# Patient Record
Sex: MALE | Race: BLACK OR AFRICAN AMERICAN | NOT HISPANIC OR LATINO | Employment: OTHER | URBAN - METROPOLITAN AREA
[De-identification: names, ages, dates, MRNs, and addresses within clinical notes are randomized per-mention and may not be internally consistent; named-entity substitution may affect disease eponyms.]

---

## 2018-07-10 ENCOUNTER — HOSPITAL ENCOUNTER (OUTPATIENT)
Dept: MEDSURG UNIT | Facility: HOSPITAL | Age: 58
End: 2018-07-11
Attending: INTERNAL MEDICINE | Admitting: INTERNAL MEDICINE

## 2018-07-10 LAB
ABS NEUT (OLG): 7.24 X10(3)/MCL (ref 2.1–9.2)
APTT PPP: 31.9 SECOND(S) (ref 24.8–36.9)
BASOPHILS # BLD AUTO: 0 X10(3)/MCL (ref 0–0.2)
BASOPHILS NFR BLD AUTO: 0 %
BUN SERPL-MCNC: 14 MG/DL (ref 7–18)
CALCIUM SERPL-MCNC: 9.3 MG/DL (ref 8.5–10.1)
CHLORIDE SERPL-SCNC: 108 MMOL/L (ref 98–107)
CO2 SERPL-SCNC: 29 MMOL/L (ref 21–32)
CREAT SERPL-MCNC: 1.14 MG/DL (ref 0.7–1.3)
CREAT/UREA NIT SERPL: 12.3
EOSINOPHIL # BLD AUTO: 0.1 X10(3)/MCL (ref 0–0.9)
EOSINOPHIL NFR BLD AUTO: 1 %
ERYTHROCYTE [DISTWIDTH] IN BLOOD BY AUTOMATED COUNT: 16.4 % (ref 11.5–17)
GLUCOSE SERPL-MCNC: 131 MG/DL (ref 74–106)
HCT VFR BLD AUTO: 39.7 % (ref 42–52)
HGB BLD-MCNC: 12.5 GM/DL (ref 14–18)
INR PPP: 0.98 (ref 0–1.27)
LYMPHOCYTES # BLD AUTO: 1.5 X10(3)/MCL (ref 0.6–4.6)
LYMPHOCYTES NFR BLD AUTO: 15 %
MAGNESIUM SERPL-MCNC: 2.2 MG/DL (ref 1.8–2.4)
MCH RBC QN AUTO: 30.2 PG (ref 27–31)
MCHC RBC AUTO-ENTMCNC: 31.5 GM/DL (ref 33–36)
MCV RBC AUTO: 95.9 FL (ref 80–94)
MONOCYTES # BLD AUTO: 0.7 X10(3)/MCL (ref 0.1–1.3)
MONOCYTES NFR BLD AUTO: 7 %
NEUTROPHILS # BLD AUTO: 7.24 X10(3)/MCL (ref 1.4–7.9)
NEUTROPHILS NFR BLD AUTO: 75 %
PLATELET # BLD AUTO: 185 X10(3)/MCL (ref 130–400)
PMV BLD AUTO: 12.1 FL (ref 9.4–12.4)
POTASSIUM SERPL-SCNC: 5.1 MMOL/L (ref 3.5–5.1)
PROTHROMBIN TIME: 13.3 SECOND(S) (ref 12.2–14.7)
RBC # BLD AUTO: 4.14 X10(6)/MCL (ref 4.7–6.1)
SODIUM SERPL-SCNC: 143 MMOL/L (ref 136–145)
WBC # SPEC AUTO: 9.6 X10(3)/MCL (ref 4.5–11.5)

## 2018-07-11 LAB
ABS NEUT (OLG): 10.34 X10(3)/MCL (ref 2.1–9.2)
ALBUMIN SERPL-MCNC: 3.3 GM/DL (ref 3.4–5)
ALBUMIN/GLOB SERPL: 0.9 RATIO (ref 1.1–2)
ALP SERPL-CCNC: 116 UNIT/L (ref 50–136)
ALT SERPL-CCNC: 26 UNIT/L (ref 12–78)
AST SERPL-CCNC: 17 UNIT/L (ref 15–37)
BASOPHILS # BLD AUTO: 0 X10(3)/MCL (ref 0–0.2)
BASOPHILS NFR BLD AUTO: 0 %
BILIRUB SERPL-MCNC: 0.6 MG/DL (ref 0.2–1)
BILIRUBIN DIRECT+TOT PNL SERPL-MCNC: 0.2 MG/DL (ref 0–0.5)
BILIRUBIN DIRECT+TOT PNL SERPL-MCNC: 0.4 MG/DL (ref 0–0.8)
BUN SERPL-MCNC: 24 MG/DL (ref 7–18)
CALCIUM SERPL-MCNC: 8.8 MG/DL (ref 8.5–10.1)
CHLORIDE SERPL-SCNC: 110 MMOL/L (ref 98–107)
CO2 SERPL-SCNC: 27 MMOL/L (ref 21–32)
CREAT SERPL-MCNC: 1.46 MG/DL (ref 0.7–1.3)
ERYTHROCYTE [DISTWIDTH] IN BLOOD BY AUTOMATED COUNT: 16.5 % (ref 11.5–17)
GLOBULIN SER-MCNC: 3.6 GM/DL (ref 2.4–3.5)
GLUCOSE SERPL-MCNC: 205 MG/DL (ref 74–106)
HCT VFR BLD AUTO: 34.9 % (ref 42–52)
HGB BLD-MCNC: 10.7 GM/DL (ref 14–18)
LYMPHOCYTES # BLD AUTO: 0.9 X10(3)/MCL (ref 0.6–4.6)
LYMPHOCYTES NFR BLD AUTO: 8 %
MCH RBC QN AUTO: 30.1 PG (ref 27–31)
MCHC RBC AUTO-ENTMCNC: 30.7 GM/DL (ref 33–36)
MCV RBC AUTO: 98.3 FL (ref 80–94)
MONOCYTES # BLD AUTO: 0.8 X10(3)/MCL (ref 0.1–1.3)
MONOCYTES NFR BLD AUTO: 7 %
NEUTROPHILS # BLD AUTO: 10.34 X10(3)/MCL (ref 1.4–7.9)
NEUTROPHILS NFR BLD AUTO: 85 %
PLATELET # BLD AUTO: 172 X10(3)/MCL (ref 130–400)
PMV BLD AUTO: 12.5 FL (ref 9.4–12.4)
POTASSIUM SERPL-SCNC: 5 MMOL/L (ref 3.5–5.1)
PROT SERPL-MCNC: 6.9 GM/DL (ref 6.4–8.2)
RBC # BLD AUTO: 3.55 X10(6)/MCL (ref 4.7–6.1)
SODIUM SERPL-SCNC: 143 MMOL/L (ref 136–145)
WBC # SPEC AUTO: 12.2 X10(3)/MCL (ref 4.5–11.5)

## 2018-09-26 ENCOUNTER — HISTORICAL (OUTPATIENT)
Dept: RESPIRATORY THERAPY | Facility: HOSPITAL | Age: 58
End: 2018-09-26

## 2021-01-18 ENCOUNTER — HISTORICAL (OUTPATIENT)
Dept: ADMINISTRATIVE | Facility: HOSPITAL | Age: 61
End: 2021-01-18

## 2021-01-18 LAB
ALBUMIN SERPL-MCNC: 3.8 GM/DL (ref 3.4–4.8)
ALBUMIN/GLOB SERPL: 1 RATIO (ref 1.1–2)
ALP SERPL-CCNC: 115 UNIT/L (ref 40–150)
ALT SERPL-CCNC: 16 UNIT/L (ref 0–55)
AST SERPL-CCNC: 14 UNIT/L (ref 5–34)
BILIRUB SERPL-MCNC: 0.3 MG/DL
BILIRUBIN DIRECT+TOT PNL SERPL-MCNC: 0.1 MG/DL (ref 0–0.5)
BILIRUBIN DIRECT+TOT PNL SERPL-MCNC: 0.2 MG/DL (ref 0–0.8)
BUN SERPL-MCNC: 19 MG/DL (ref 8.4–25.7)
CALCIUM SERPL-MCNC: 9.2 MG/DL (ref 8.8–10)
CHLORIDE SERPL-SCNC: 104 MMOL/L (ref 98–107)
CHOLEST SERPL-MCNC: 208 MG/DL
CHOLEST/HDLC SERPL: 5 {RATIO} (ref 0–5)
CO2 SERPL-SCNC: 27 MMOL/L (ref 23–31)
CREAT SERPL-MCNC: 1.34 MG/DL (ref 0.73–1.18)
GLOBULIN SER-MCNC: 3.8 GM/DL (ref 2.4–3.5)
GLUCOSE SERPL-MCNC: 182 MG/DL (ref 82–115)
HDLC SERPL-MCNC: 41 MG/DL (ref 35–60)
LDLC SERPL CALC-MCNC: 139 MG/DL (ref 50–140)
POTASSIUM SERPL-SCNC: 4 MMOL/L (ref 3.5–5.1)
PROT SERPL-MCNC: 7.6 GM/DL (ref 5.8–7.6)
SODIUM SERPL-SCNC: 139 MMOL/L (ref 136–145)
TRIGL SERPL-MCNC: 139 MG/DL (ref 34–140)
TSH SERPL-ACNC: 1.65 UIU/ML (ref 0.35–4.94)
VLDLC SERPL CALC-MCNC: 28 MG/DL

## 2022-01-12 ENCOUNTER — HISTORICAL (OUTPATIENT)
Dept: ADMINISTRATIVE | Facility: HOSPITAL | Age: 62
End: 2022-01-12

## 2022-01-12 LAB
ABS NEUT (OLG): 4.34 X10(3)/MCL (ref 2.1–9.2)
ALBUMIN SERPL-MCNC: 3.8 GM/DL (ref 3.4–4.8)
ALBUMIN/GLOB SERPL: 0.8 RATIO (ref 1.1–2)
ALP SERPL-CCNC: 119 UNIT/L (ref 40–150)
ALT SERPL-CCNC: 16 UNIT/L (ref 0–55)
APPEARANCE, UA: CLEAR
AST SERPL-CCNC: 18 UNIT/L (ref 5–34)
BACTERIA SPEC CULT: ABNORMAL
BASOPHILS # BLD AUTO: 0 X10(3)/MCL (ref 0–0.2)
BASOPHILS NFR BLD AUTO: 1 %
BILIRUB SERPL-MCNC: 0.3 MG/DL
BILIRUB UR QL STRIP: NEGATIVE
BILIRUBIN DIRECT+TOT PNL SERPL-MCNC: 0.1 MG/DL (ref 0–0.5)
BILIRUBIN DIRECT+TOT PNL SERPL-MCNC: 0.2 MG/DL (ref 0–0.8)
BUN SERPL-MCNC: 13.7 MG/DL (ref 8.4–25.7)
CALCIUM SERPL-MCNC: 9.8 MG/DL (ref 8.7–10.5)
CHLORIDE SERPL-SCNC: 106 MMOL/L (ref 98–107)
CHOLEST SERPL-MCNC: 160 MG/DL
CHOLEST/HDLC SERPL: 4 {RATIO} (ref 0–5)
CO2 SERPL-SCNC: 27 MMOL/L (ref 23–31)
COLOR UR: ABNORMAL
CREAT SERPL-MCNC: 1.64 MG/DL (ref 0.73–1.18)
EOSINOPHIL # BLD AUTO: 0.1 X10(3)/MCL (ref 0–0.9)
EOSINOPHIL NFR BLD AUTO: 1 %
ERYTHROCYTE [DISTWIDTH] IN BLOOD BY AUTOMATED COUNT: 15 % (ref 11.5–14.5)
EST CREAT CLEARANCE SER (OHS): 47.6 ML/MIN
EST. AVERAGE GLUCOSE BLD GHB EST-MCNC: 171.4 MG/DL
GLOBULIN SER-MCNC: 4.6 GM/DL (ref 2.4–3.5)
GLUCOSE (UA): NORMAL /UL
GLUCOSE SERPL-MCNC: 147 MG/DL (ref 82–115)
HBA1C MFR BLD: 7.6 %
HCT VFR BLD AUTO: 44.9 % (ref 40–51)
HDLC SERPL-MCNC: 40 MG/DL (ref 35–60)
HGB BLD-MCNC: 14.3 GM/DL (ref 13.5–17.5)
HGB UR QL STRIP: ABNORMAL /HPF
HYALINE CASTS #/AREA URNS LPF: ABNORMAL /LPF
IMM GRANULOCYTES # BLD AUTO: 0.03 10*3/UL
IMM GRANULOCYTES NFR BLD AUTO: 0 %
KETONES UR QL STRIP: NEGATIVE /UL
LDLC SERPL CALC-MCNC: 103 MG/DL (ref 50–140)
LEUKOCYTE ESTERASE UR QL STRIP: NEGATIVE
LYMPHOCYTES # BLD AUTO: 1 X10(3)/MCL (ref 0.6–4.6)
LYMPHOCYTES NFR BLD AUTO: 16 %
MCH RBC QN AUTO: 28.5 PG (ref 26–34)
MCHC RBC AUTO-ENTMCNC: 31.8 GM/DL (ref 31–37)
MCV RBC AUTO: 89.4 FL (ref 80–100)
MONOCYTES # BLD AUTO: 1.1 X10(3)/MCL (ref 0.1–1.3)
MONOCYTES NFR BLD AUTO: 17 %
MUCOUS THREADS URNS QL MICRO: ABNORMAL /LPF
NEUTROPHILS # BLD AUTO: 4.34 X10(3)/MCL (ref 2.1–9.2)
NEUTROPHILS NFR BLD AUTO: 65 %
NITRITE UR QL STRIP: NEGATIVE
NRBC BLD AUTO-RTO: 0 % (ref 0–0.2)
PH UR STRIP: 5.5 /UL (ref 4.5–8)
PLATELET # BLD AUTO: 222 X10(3)/MCL (ref 130–400)
PMV BLD AUTO: 12.4 FL (ref 7.4–10.4)
POTASSIUM SERPL-SCNC: 4 MMOL/L (ref 3.5–5.1)
PROT SERPL-MCNC: 8.4 GM/DL (ref 5.8–7.6)
PROT UR QL STRIP: ABNORMAL /UL
PSA SERPL-MCNC: 0.29 NG/ML
RBC # BLD AUTO: 5.02 X10(6)/MCL (ref 4.5–5.9)
RBC #/AREA URNS HPF: ABNORMAL /HPF
SODIUM SERPL-SCNC: 140 MMOL/L (ref 136–145)
SP GR UR STRIP: 1.02 (ref 1–1.03)
SQUAMOUS EPITHELIAL, UA: ABNORMAL /LPF
TRIGL SERPL-MCNC: 86 MG/DL (ref 34–140)
TSH SERPL-ACNC: 0.21 UIU/ML (ref 0.35–4.94)
UROBILINOGEN UR STRIP-ACNC: NORMAL /HPF
VLDLC SERPL CALC-MCNC: 17 MG/DL
WBC # SPEC AUTO: 6.6 X10(3)/MCL (ref 4.5–11)
WBC #/AREA URNS HPF: ABNORMAL /HPF

## 2022-01-19 ENCOUNTER — HISTORICAL (OUTPATIENT)
Dept: INTERNAL MEDICINE | Facility: CLINIC | Age: 62
End: 2022-01-19

## 2022-04-09 ENCOUNTER — HISTORICAL (OUTPATIENT)
Dept: ADMINISTRATIVE | Facility: HOSPITAL | Age: 62
End: 2022-04-09

## 2022-04-27 VITALS
BODY MASS INDEX: 45.1 KG/M2 | WEIGHT: 315 LBS | OXYGEN SATURATION: 100 % | SYSTOLIC BLOOD PRESSURE: 130 MMHG | HEIGHT: 70 IN | DIASTOLIC BLOOD PRESSURE: 80 MMHG

## 2022-04-29 NOTE — OP NOTE
Patient:   Jamey Duke             MRN: [id number]            FIN: [id number]               Age:   57 years     Sex:  Male     :  1960   Associated Diagnoses:   None   Author:   Chapin Syed MD      Operative Note   Operative Information   Date/ Time:  7/10/2018 08:53:00.     Procedures Performed:   1.  Comprehensive EP study without induction of arrhythmia  2.  Left atrial pacing and recording from coronary sinus catheter  3.  Program assimilation pacing after IV drug infusion  4.  Ablation of the caval tricuspid isthmus for typical atrial flutter  5.  3D mapping  .     Indications:   57-year-old white male with typical atrial flutter who is undergoing EP study for definitive diagnosis as well as ablation for definitive therapy.  .     Preoperative Diagnosis:   1.  Typical atrial flutter  2.  History of persistent atrial fibrillation  .     Postoperative Diagnosis:   1.  Typical atrial flutter  2.  History of persistent atrial fibrillation  .     Surgeon: Chapin Syed MD.     Esimated blood loss: loss less than  10  cc.     Description of Procedure/Findings/    Complications:   Summary of procedure:  After risks, benefits, and alternatives were Splane the patient, informed consent was obtained.  The patient brought to the EP lab in a fasting nonsedated state.  Sedation for the procedure was accompanied by the anesthesia team.  The bilateral groins were prepped and draped in usual fashion.  Using 1% lidocaine the right groin was anesthetized.  Using a modified Seldinger technique access was obtained to right femoral vein on 3 occasions were 2 7 [place] citizen sheaths and a 6 [place] citizen sheath were placed.  I then advanced a [employer] decapolar CS catheter through first 7 [place] citizen sheath to the heart and into the coronary sinus for left atrial pacing and recording.  I then advanced a duo decapolar Halo catheter and a CRD 2 catheter.  The halo catheters placed in right atrium along the vitaliy terminalis  for right atrial pacing and recording.  The CRD 2 catheter was placed in the His bundle region for His bundle recording.  Based on measures then obtained.    The patient did present in a typical appearing atrial flutter with counterclockwise activation of the right atrium and concentric activation left atrium.  We performed a treatment from both the proximal coronary sinus and distal halo.  The post pacing intervals from both locations were consistent with being in the tachycardia circuit.  With these findings I concluded the patient had typical isthmus dependent atrial flutter.    The CRD 2 catheter was removed from the body and a 6 [place] sheath was exchanged for a SRO sheath.  I then advanced a [employer] 4 mm tip F-J curve irrigated ablation catheter through the SRO sheath and placed at the distal aspect of the caval tricuspid isthmus in the 6 o'clock position.  I then performed drag line ablation lesions through the caval tricuspid isthmus.  When ablating the proximal aspect of the isthmus there was termination of atrial flutter and then with pacing from the proximal coronary sinus we confirmed bidirectional block.  Some consolidating lesions were then performed in this area.  I then repositioned the ablation catheter at the distal aspect of this previous line and traced this line performing ablation in areas where there are adequate atrial signals.  The ablation catheter was then placed back in the His bundle region and post ablation measurements were obtained.    I then performed post ablation atrial study with atrial burst pacing, decremental pacing, and extra stimuli.  After 10 minutes the patient was then given 12 mg of IV adenosine to hyper-do polarize atrial tissue and evaluate for reconnection of conduction through the caval tricuspid isthmus with pacing from both the proximal coronary sinus and distal halo catheters.  There is no evidence of reconnection.  After 15 minutes again performed  pacing from the proximal coronary sinus distal halo and confirmed bidirectional block.    At this point I decided to terminate the procedure.  The catheters removed from the body the sheaths were flushed.  The SRO sheath was exchanged for a short 9 Kinyarwanda sheath.  The patient be transferred to the postanesthesia care unit where the sheath will be removed and hemostasis will be obtained with manual compression.    3D mapping was used throughout the procedure to cynthia location the catheters as well as ablation lesions.    Baseline measurements:  The patient presented in a counterclockwise activating or atrial flutter with concentric activation left atrium.  The atrial flutter cycle length was 270 ms.  AV node: The HV interval was 47 ms  Ventricle: The QRS duration was 97 ms and the QT interval was 380 ms    Tachycardia characterization: The post pacing interval after entrainment from the distal halo catheter was 287 ms.  The post pacing interval after an treatment from the proximal coronary sinus was 280 ms.    Ablation: I performed a total number of 4 ablation lesions for total time of 12 minutes and 40 seconds.  Average temperature is 27°C and average power was 28 W.    Post ablation study:  Sinus node: The sinus cycle length was 819 ms  AV node: The CO interval was 170 ms, the AH interval was 111 ms, HV was 47 ms  Ventricle: QRS duration was 90 ms and the QT was 475 ms    Atrial study: The AV block cycle length was found at 510 ms.  AV node ERP was found at 440 ms after drive train of 600 ms.  Atrial ERP was found at 260 ms of a drive train of 600 ms.    Trans-isthmus conduction: The post ablation trans-isthmus conduction time with pacing from the proximal point sinus the distal halo catheter was 140 ms.  The post ablation trans-isthmus conduction time with pacing from the distal halo catheter the proximal coronary sinus was 150 ms.  There was positive differential pacing as when we paced from halo electrodes 5, 6  the trans-isthmus conduction time to the proximal coronary sinus was 138 ms.    Impression:  1.  Typical isthmus dependent atrial flutter  2.  Successful ablation of the caval tricuspid isthmus with termination of atrial flutter and achievement of bidirectional block  3.  Abnormal supra his AV rose conduction while sedated    Plan:  We will monitor patient overnight for complications from the procedure.  He will receive half dose Lovenox 8 hours after hemostasis obtained.  We will restart full anticoagulation therapy tomorrow morning.  If patient has recurrence of atrial for ablation while on amiodarone therapy we will likely consider attempted pulmonary vein isolation.  .     Complications: None.

## 2022-05-02 NOTE — HISTORICAL [facility] CERNER
This is a historical note converted from [facility]. Formatting and pictures may have been removed.  Please reference [facility] for original formatting and attached multimedia. Chief Complaint  Here for check up. Denies chest p;ain, continues to have SOB. ADLs poor X 1 year.  History of Present Illness  This is a 60 year old BM w/ HTN,? HLD, h/o persistent AF/AFL,?DEEP, ?and h/o NICMP (EF -45% per?TTE 1/2020). ?He has attempted multiple DCCVs but has had recurrence of AFL.??The patient originally?presented to [facility] cardiology clinic as a new referral?in October 2020.??He was a former patient of Dr. Gutierrez and Dr. Syed.?? During his last clinic visit?the patient endorsed stable shortness of breath?and?bilateral lower extremity edema?with worsening on the right.? Today the patient?presents for routine follow-up and ongoing care.? He continues to endorse stable shortness of breath with exertion?that has not progressed since his last clinic visit.? He reports?his?ADLs are unchanged.? He denies any orthopnea, PND, palpitations, dizziness, or syncope.? Latest echocardiogram completed in January 2020 revealed?an ejection fraction of approximately 45%.? He had a normal perfusion study in December 2018.? Of note, the patient reports he was unable to?complete his?venous reflux studies.??However, he endorses resolution?of his peripheral edema after?decreasing the amount of sodium in his diet.? He also reports that he ordered compression stockings.?? He reports compliance with his current medication regimen  ?  ?  ?   ?????PMH: HTN, HLD, h/o persistent AF/AFL, and h/o NICMP  ?????PSH: ablation  ?????Social History:?Denies any?illicit drug, tobacco use-smokes occasional cigar . Reports occasional ETOH use  ?????Family History: Father -leaky valve  ??????  ?????Previous Diagnostics:?  ?????  TTE 1.24.20  ?   1.????The study quality is below average.  2.????The left ventricle is normal in size. Global left ventricular systolic function is  mildly decreased. The left ventricular ejection fraction is 45%.  3.????The left atrial diameter is mildly increased. The right atrium is mildly enlarged.  4.????Mild (1+) mitral regurgitation. Mild (1+) tricuspid regurgitation.  5.????The pulmonary artery systolic pressure is 35 mmHg.?  ?   PET 12.6.18  ?????This is a normal perfusion study, no perfusion defects noted.  ?????This scan is suggestive of low risk for future cardiovascular events.  ?????The left ventricular cavity is noted to be moderately enlarged on the stress studies. The stress left ventricular ejection fraction was calculated to be 45% and left ventricular global function is mildly reduced. The rest left ventricular cavity is noted to be mildly enlarged. The rest left ventricular ejection fraction was calculated to be 39% and rest left ventricular global function is moderately reduced.  ?????When compared to the resting ejection fraction (39%), the stress ejection fraction (45%) has increased.  ?????The study quality is good.  ?   Carotid US 2.9.18  1.????The study quality is below average.  2.????1-39% stenosis in the proximal right internal carotid artery based on Bluth Criteria.  3.????Antegrade right vertebral artery flow.  4.????Antegrade left vertebral artery flow  ?   C 1.3.17  EF -20%  Normal coronaries  ?  Review of Systems  Constitutional: negative for fever,chills, sweats, weakness, fatigue, decreased activity?????  Eye: negative  ENMT: negative  Respiratory:?stable SOB  Cardiovascular: negative  Gastrointestinal: negative?for nausea, vomiting, abdominal pain, constipation, diarrhea  Genitourinary: negative  Endocrine: negative  Musculoskeletal: negative  Integumentary: negative  Neurologic: negative  Psychiatric: negative  All Other ROS: negative  Physical Exam  Vitals & Measurements  T:?36.9? ?C (Oral)? HR:?86(Peripheral)? BP:?132/90? SpO2:?100%? HT:?177.00?cm? WT:?149.7?kg? WT:?149.7?kg? WT:?149.700?kg?  General: alert and  oriented/no acute distress  Eye: EOMI/normal conjunctiva  HENT: normocephalic/moist oral mucosa  Neck: supple/nontender/no carotid bruit/no JVD  Respiratory: lungs CTA/nonlabored respirations/BS equal/symmetrical expansion/no chest wall tenderness  Cardiovascular: normal rate/normal rhythm/no murmur/normal peripheral perfusion/no edema  Gastrointestinal: soft/nontender/nondistended  Musculoskeletal: normal ROM/normal strength  Integumentary: warm/dry/pink/intact  Neurologic: alert/oriented/normal sensory/no focal deficits  Psychiatric: cooperative/appropriate mood and affect/normal judgment  Assessment/Plan  h/o persistent AF/AFL Status post RFA on (7.10.18)?  -Denies palpitations  -Patient has had multiple attempted DCCVs but has had recurrence of AFL  -Last EKG -NSR  -? Continue with amiodarone 200 mg p.o. daily and Toprol succinate 50qd  Latest CXR for Amio monitoring -stable,??will add TSH to labs . Pending IM referral for eye exam  - Continue Xarelto?20 mg p.o. daily. Denies any adverse bleeding issues  ?   NICMP /HFrEF recovered originally 20% from a former angiogram  LVEF -45%, Mild MR, Mild TR (1.24.20)  Normal perfusion study, no perfusion defects noted (12.6.18)  LHC- EF-20%, Normal coronaries (1.3.17)  Euvolemic upon exam warm and dry  Reports stable SOB. Denies CP. ADLs unchanged  Continue GDMT: Toprol succinate 50 daily and losartan.?Intolerance to ?lisinopril secondary to a cough.  Continue Lasix 40  Counseled patient on heart healthy, low-sodium diet and activity as tolerated  ?   HTN-controlled  Continue current regimen  Counseled?on low-sodium diet  Pending referral to internal medicine for establishment  ?   BLE edema  Reports resolution of?peripheral edema  venous studies of BLE-did not complete studies. ?Would like to hold off for now considering him?resolution of symptoms.  Recommend patient?to continue low-sodium diet and compression stocking  ?   HLD  Continue with?Pravastatin 40 mg po  daily  Counseled patient on?low-cholesterol, low-fat?diet, and encourage exercise as tolerated.  FLP pending  ?   Carotid Artery disease  ?1-39% stenosis in the proximal right internal carotid artery based on Bluth Criteria. (2018)  Denies any dizziness, syncope?or focal weakness  ?  ?  Tobacco use  Patient reports that he smokes a cigar?occasionally  Counseled on importance of?smoking cessation  ?  TSH today  Follow-up in cardiology clinic in 3 months  Follow up with PCP as directed   Problem List/Past Medical History  Ongoing  Atrial flutter  Hypercholesterolaemia  Hypertension  Morbid obesity  Tobacco user  Historical  Atrial fibrillation  Atrial flutter  Cardiomyopathy  Carotid artery disease  HLD - Hyperlipidemia  HTN - Hypertension  Tricuspid regurgitation  Procedure/Surgical History  Comprehensive electrophysiologic evaluation including insertion and repositioning of multiple electrode catheters with induction or attempted induction of an arrhythmia with right atrial pacing and recording, right ventricular pacing and recording (when n (07/10/2018)  Comprehensive electrophysiologic evaluation including insertion and repositioning of multiple electrode catheters with induction or attempted induction of arrhythmia; with left atrial pacing and recording from coronary sinus or left atrium (List separatel (07/10/2018)  Destruction of Conduction Mechanism, Percutaneous Approach (07/10/2018)  Cardioversion x 2   Medications  amiodarone 200 mg oral Tab, 200 mg= 1 tab(s), Oral, Daily  APPLE CIDER VINEGAR 600 MG, 1 tab(s), Oral, Daily  Fish Oil 1200 mg oral capsule, 1200 mg= 1 cap(s), Oral, Daily  furosemide 40 mg oral tablet, 40 mg= 1 tab(s), Oral, BID, 6 refills  garlic oral tablet, 1 tab(s), Oral, Daily  hydrALAZINE 50 mg oral tablet, 50 mg= 1 tab(s), Oral, BID  losartan 25 mg oral tablet, 25 mg= 1 tab(s), Oral, Daily, 6 refills  metoprolol succinate 50 mg oral tablet, extended release, 50 mg= 1 tab(s), Oral,  Daily  multivitamin with minerals (Adult Tab), 1 tab(s), Oral, Daily  potassium chloride 20 mEq oral TABLET extended release, 20 mEq= 1 tab(s), Oral, Daily  Pravastatin 40 mg Oral Tab, 40 mg= 1 tab(s), Oral, At Bedtime  Xarelto 20mg Tablet, 20 mg= 1 tab(s), Oral, With Supper, 11 refills  Allergies  No Known Medication Allergies  Social History  Abuse/Neglect  No, 11/23/2020  Alcohol  Current, Beer, 1-2 times per week, 02/24/2020  Nutrition/Health  Regular, 02/24/2020  Substance Use  Never, 02/24/2020  Tobacco  Cigars or pipes but not daily within last 30 days, No, 11/23/2020  Family History  Hypertension.: Mother.  Valvular heart disease: Father.  Health Maintenance  Health Maintenance  ???Pending?(in the next year)  ??? ??OverDue  ??? ? ? ?Hypertension Management-BMP due??07/11/19??and every 1??year(s)  ??? ? ? ?Influenza Vaccine due??10/01/20??and every 1??day(s)  ??? ? ? ?Smoking Cessation due??01/01/21??Variable frequency  ??? ??Due?  ??? ? ? ?Obesity Screening due??01/01/21??and every 1??year(s)  ??? ? ? ?Alcohol Misuse Screening due??01/02/21??and every 1??year(s)  ??? ? ? ?ADL Screening due??01/18/21??and every 1??year(s)  ??? ? ? ?Aspirin Therapy for CVD Prevention due??01/18/21??and every 1??year(s)  ??? ? ? ?Colorectal Screening due??01/18/21??Unknown Frequency  ??? ? ? ?Depression Screening due??01/18/21??Unknown Frequency  ??? ? ? ?HF-LVEF due??01/18/21??Unknown Frequency  ??? ? ? ?Hypertension Management-Education due??01/18/21??and every 1??year(s)  ??? ? ? ?Lung Cancer Screening due??01/18/21??and every 1??year(s)  ??? ? ? ?Tetanus Vaccine due??01/18/21??and every 10??year(s)  ??? ? ? ?Zoster Vaccine due??01/18/21??Unknown Frequency  ??? ??Due In Future?  ??? ? ? ?Diabetes Screening not due until??07/10/21??and every 3??year(s)  ??? ? ? ?HF-Heart Failure Education not due until??10/15/21??and every 1??year(s)  ??? ? ? ?Blood Pressure Screening not due until??11/23/21??and every 1??year(s)  ??? ? ?  ?Body Mass Index Check not due until??11/23/21??and every 1??year(s)  ??? ? ? ?Hypertension Management-Blood Pressure not due until??11/23/21??and every 1??year(s)  ???Satisfied?(in the past 1 year)  ??? ??Satisfied?  ??? ? ? ?Blood Pressure Screening on??11/23/20.??Satisfied by Audrey Trejo RN  ??? ? ? ?Body Mass Index Check on??11/23/20.??Satisfied by Audrey Trejo RN  ??? ? ? ?Hypertension Management-Blood Pressure on??11/23/20.??Satisfied by Audrey Trejo RN  ??? ? ? ?Influenza Vaccine on??02/24/20.??Satisfied by Yuliet Fowler RNlis  ??? ? ? ?Obesity Screening on??11/23/20.??Satisfied by Audrey Trejo RN  ?

## 2022-05-12 ENCOUNTER — CLINICAL SUPPORT (OUTPATIENT)
Dept: INTERNAL MEDICINE | Facility: CLINIC | Age: 62
End: 2022-05-12
Attending: NURSE PRACTITIONER

## 2022-05-12 ENCOUNTER — OFFICE VISIT (OUTPATIENT)
Dept: INTERNAL MEDICINE | Facility: CLINIC | Age: 62
End: 2022-05-12

## 2022-05-12 VITALS
HEART RATE: 82 BPM | RESPIRATION RATE: 18 BRPM | WEIGHT: 315 LBS | BODY MASS INDEX: 45.1 KG/M2 | DIASTOLIC BLOOD PRESSURE: 92 MMHG | TEMPERATURE: 98 F | HEIGHT: 70 IN | SYSTOLIC BLOOD PRESSURE: 134 MMHG

## 2022-05-12 DIAGNOSIS — E11.9 NEWLY DIAGNOSED DIABETES: ICD-10-CM

## 2022-05-12 DIAGNOSIS — I48.92 ATRIAL FLUTTER, UNSPECIFIED TYPE: ICD-10-CM

## 2022-05-12 DIAGNOSIS — Z72.0 TOBACCO USER: ICD-10-CM

## 2022-05-12 DIAGNOSIS — E78.2 MIXED HYPERLIPIDEMIA: ICD-10-CM

## 2022-05-12 DIAGNOSIS — N52.8 OTHER MALE ERECTILE DYSFUNCTION: ICD-10-CM

## 2022-05-12 DIAGNOSIS — Z13.5 SCREENING FOR DIABETIC RETINOPATHY: Primary | ICD-10-CM

## 2022-05-12 DIAGNOSIS — E05.90 SUBCLINICAL HYPERTHYROIDISM: ICD-10-CM

## 2022-05-12 DIAGNOSIS — I10 PRIMARY HYPERTENSION: ICD-10-CM

## 2022-05-12 PROBLEM — E78.5 HYPERLIPIDEMIA: Status: ACTIVE | Noted: 2022-05-12

## 2022-05-12 LAB
ALBUMIN SERPL-MCNC: 3.8 GM/DL (ref 3.4–4.8)
ALBUMIN/GLOB SERPL: 0.9 RATIO (ref 1.1–2)
ALP SERPL-CCNC: 117 UNIT/L (ref 40–150)
ALT SERPL-CCNC: 12 UNIT/L (ref 0–55)
AST SERPL-CCNC: 15 UNIT/L (ref 5–34)
BILIRUBIN DIRECT+TOT PNL SERPL-MCNC: 0.6 MG/DL
BUN SERPL-MCNC: 16.7 MG/DL (ref 8.4–25.7)
CALCIUM SERPL-MCNC: 10 MG/DL (ref 8.8–10)
CHLORIDE SERPL-SCNC: 102 MMOL/L (ref 98–107)
CO2 SERPL-SCNC: 28 MMOL/L (ref 23–31)
CREAT SERPL-MCNC: 1.39 MG/DL (ref 0.73–1.18)
EST. AVERAGE GLUCOSE BLD GHB EST-MCNC: 134.1 MG/DL
GLOBULIN SER-MCNC: 4.4 GM/DL (ref 2.4–3.5)
GLUCOSE SERPL-MCNC: 129 MG/DL (ref 82–115)
HBA1C MFR BLD: 6.3 %
POTASSIUM SERPL-SCNC: 4.1 MMOL/L (ref 3.5–5.1)
PROT SERPL-MCNC: 8.2 GM/DL (ref 5.8–7.6)
SODIUM SERPL-SCNC: 138 MMOL/L (ref 136–145)
T4 FREE SERPL-MCNC: 1.06 NG/DL (ref 0.7–1.48)
TSH SERPL-ACNC: 2.19 UIU/ML (ref 0.35–4.94)

## 2022-05-12 PROCEDURE — 80053 COMPREHEN METABOLIC PANEL: CPT | Performed by: NURSE PRACTITIONER

## 2022-05-12 PROCEDURE — 84443 ASSAY THYROID STIM HORMONE: CPT | Performed by: NURSE PRACTITIONER

## 2022-05-12 PROCEDURE — 84439 ASSAY OF FREE THYROXINE: CPT | Performed by: NURSE PRACTITIONER

## 2022-05-12 PROCEDURE — 2025F 7 FLD RTA PHOTO W/O RTNOPTHY: CPT | Mod: PBBFAC | Performed by: INTERNAL MEDICINE

## 2022-05-12 PROCEDURE — 83036 HEMOGLOBIN GLYCOSYLATED A1C: CPT | Performed by: NURSE PRACTITIONER

## 2022-05-12 PROCEDURE — 36415 COLL VENOUS BLD VENIPUNCTURE: CPT | Performed by: NURSE PRACTITIONER

## 2022-05-12 PROCEDURE — 92228 IMG RTA DETC/MNTR DS PHY/QHP: CPT | Mod: PBBFAC

## 2022-05-12 PROCEDURE — 99214 OFFICE O/P EST MOD 30 MIN: CPT | Mod: PBBFAC | Performed by: NURSE PRACTITIONER

## 2022-05-12 PROCEDURE — 99214 PR OFFICE/OUTPT VISIT, EST, LEVL IV, 30-39 MIN: ICD-10-PCS | Mod: S$PBB,,, | Performed by: NURSE PRACTITIONER

## 2022-05-12 PROCEDURE — 99214 OFFICE O/P EST MOD 30 MIN: CPT | Mod: S$PBB,,, | Performed by: NURSE PRACTITIONER

## 2022-05-12 RX ORDER — LOSARTAN POTASSIUM 25 MG/1
25 TABLET ORAL DAILY
Qty: 90 TABLET | Refills: 1 | Status: SHIPPED | OUTPATIENT
Start: 2022-05-12 | End: 2022-05-20 | Stop reason: SDUPTHER

## 2022-05-12 RX ORDER — FUROSEMIDE 40 MG/1
40 TABLET ORAL 2 TIMES DAILY
Qty: 90 TABLET | Refills: 1 | Status: SHIPPED | OUTPATIENT
Start: 2022-05-12 | End: 2022-05-20 | Stop reason: SDUPTHER

## 2022-05-12 RX ORDER — METOPROLOL SUCCINATE 50 MG/1
50 TABLET, EXTENDED RELEASE ORAL DAILY
COMMUNITY
Start: 2022-03-11 | End: 2022-05-12 | Stop reason: SDUPTHER

## 2022-05-12 RX ORDER — RIVAROXABAN 20 MG/1
20 TABLET, FILM COATED ORAL DAILY
Qty: 90 TABLET | Refills: 1 | Status: SHIPPED | OUTPATIENT
Start: 2022-05-12 | End: 2022-05-20 | Stop reason: SDUPTHER

## 2022-05-12 RX ORDER — METOPROLOL SUCCINATE 50 MG/1
50 TABLET, EXTENDED RELEASE ORAL DAILY
Qty: 90 TABLET | Refills: 1 | Status: SHIPPED | OUTPATIENT
Start: 2022-05-12 | End: 2022-05-20 | Stop reason: SDUPTHER

## 2022-05-12 RX ORDER — POTASSIUM CHLORIDE 1500 MG/1
20 TABLET, EXTENDED RELEASE ORAL DAILY
COMMUNITY
Start: 2022-02-04 | End: 2022-05-12 | Stop reason: SDUPTHER

## 2022-05-12 RX ORDER — HYDRALAZINE HYDROCHLORIDE 50 MG/1
50 TABLET, FILM COATED ORAL 2 TIMES DAILY
Qty: 180 TABLET | Refills: 1 | Status: SHIPPED | OUTPATIENT
Start: 2022-05-12 | End: 2022-05-20 | Stop reason: SDUPTHER

## 2022-05-12 RX ORDER — PRAVASTATIN SODIUM 40 MG/1
40 TABLET ORAL NIGHTLY
COMMUNITY
Start: 2022-01-18 | End: 2022-05-12 | Stop reason: SDUPTHER

## 2022-05-12 RX ORDER — SILDENAFIL 25 MG/1
25 TABLET, FILM COATED ORAL DAILY PRN
Qty: 15 TABLET | Refills: 1 | Status: SHIPPED | OUTPATIENT
Start: 2022-05-12 | End: 2022-09-19

## 2022-05-12 RX ORDER — FUROSEMIDE 40 MG/1
40 TABLET ORAL 2 TIMES DAILY
COMMUNITY
Start: 2022-03-08 | End: 2022-05-12 | Stop reason: SDUPTHER

## 2022-05-12 RX ORDER — LOSARTAN POTASSIUM 25 MG/1
25 TABLET ORAL DAILY
COMMUNITY
Start: 2022-01-18 | End: 2022-05-12 | Stop reason: SDUPTHER

## 2022-05-12 RX ORDER — LANCETS 33 GAUGE
EACH MISCELLANEOUS
COMMUNITY
Start: 2022-01-14

## 2022-05-12 RX ORDER — PRAVASTATIN SODIUM 40 MG/1
40 TABLET ORAL NIGHTLY
Qty: 90 TABLET | Refills: 1 | Status: SHIPPED | OUTPATIENT
Start: 2022-05-12 | End: 2022-05-20 | Stop reason: SDUPTHER

## 2022-05-12 RX ORDER — POTASSIUM CHLORIDE 1500 MG/1
20 TABLET, EXTENDED RELEASE ORAL DAILY
Qty: 90 TABLET | Refills: 1 | Status: SHIPPED | OUTPATIENT
Start: 2022-05-12 | End: 2022-05-20 | Stop reason: SDUPTHER

## 2022-05-12 RX ORDER — RIVAROXABAN 20 MG/1
TABLET, FILM COATED ORAL
COMMUNITY
Start: 2022-04-11 | End: 2022-05-12 | Stop reason: SDUPTHER

## 2022-05-12 RX ORDER — HYDRALAZINE HYDROCHLORIDE 50 MG/1
50 TABLET, FILM COATED ORAL 2 TIMES DAILY
COMMUNITY
Start: 2021-11-26 | End: 2022-05-12 | Stop reason: SDUPTHER

## 2022-05-12 RX ORDER — INSULIN PUMP SYRINGE, 3 ML
EACH MISCELLANEOUS
COMMUNITY
Start: 2022-01-14

## 2022-05-12 NOTE — ASSESSMENT & PLAN NOTE
A1C -did not have drawn prior to visit. Previous A1C 7.6.   Continue metformin  mg with supper.  Takes xarelto, losartan and pravastatin daily.  Continue medications as prescribed.  Follow ADA diet.  Avoid soda, simple sweets, and limit rice/pasta/bread/starches and consume brown options when possible.   Maintain healthy weight with BMI goal <30.   Perform aerobic exercise for 150 minutes per week (or 5 days a week for 30 minutes each day).   Examine feet daily.   Obtain annual dilated eye exam.  Eye exam: 5/12/22  Foot exam: 5/12/22

## 2022-05-12 NOTE — ASSESSMENT & PLAN NOTE
BMI 45. Goal BMI <30.  Aerobic exercise 150 minutes per week.  Avoid soda, simple sugars, sweets, excessive rice, pasta, potatoes or bread.   Choose brown options when available and portion control.  Limit fast foods and fried foods.   Choose complex carbs in moderation (ex: green, leafy vegetables, beans, oatmeal).  Eat plenty of fresh fruits and vegetables with lean meats daily.   Consider permanent healthy lifestyle changes.

## 2022-05-12 NOTE — ASSESSMENT & PLAN NOTE
Keep cardio appts/diagnostics as scheduled.  Denies any palpitations.  Continue amiodarone, xarelto and toprol as prescribed.

## 2022-05-12 NOTE — ASSESSMENT & PLAN NOTE
/ Trig 86 / HDL - 40  / Total chol - 160.  Refilled pravastatin.  Follow a low cholesterol, low saturated fat diet with less than 200 mg of cholesterol a day.   Avoid fried foods and high saturated fats (meadows, sausage, cookies, cakes, chips, cheese, whole milk, butter, mayonnaise, creamy dressings, gravy and cream sauces).  Add flax seed or fish oil supplements to diet.   Increase dietary fiber.   Regular exercise improves cholesterol levels.  Physical activity 5 times a week for 30 minutes per day (or 150 minutes per week).   Stressed importance of dietary modifications.    Continue pravastatin as prescribed.   Follow a low cholesterol, low saturated fat diet with less than 200 mg of cholesterol a day.   Avoid fried foods and high saturated fats (meadows, sausage, cookies, cakes, chips, cheese, whole milk, butter, mayonnaise, creamy dressings, gravy, stew, gumbo, boudin, cracklins and cream sauces).  Add flax seed or fish oil supplements to diet.   Increase dietary fiber.   Regular exercise improves cholesterol levels.  Physical activity 5 times a week for 30 minutes per day (or 150 minutes per week).   Stressed importance of dietary modifications.

## 2022-05-12 NOTE — PROGRESS NOTES
MARIBELL Irizarry   [facility]  [address] [facility] [address]      PATIENT NAME: Jamey Duke  : 1960  DATE: 22  MRN: [id number]      Billing Provider: MARIBELL Irizarry  Level of Service:   Patient PCP Information     Provider PCP Type    MARIBELL Irizarry General          Reason for Visit / Chief Complaint: Follow-up       History of Present Illness / Problem Focused Workflow     Jamey Duke is a 61 y.o. Black or  male presents to the clinic for f/u visit. PMH HTN, HLD, h/o persistent AF/AFL, DEEP, and h/o NICMP (EF -45% per TTE 2020) w/multiple DCCVs -AFL recurred, and ED. Followed by [facility] cardio clinic. Reports has been out of cardiac meds x 2 weeks and only has 4 xarelto tabs left. Has been unable to obtain refills. Will refill today until next cardio visit. Reports previous med compliance. Requesting med for ED; reports given rx for viagra in the past from previous cardiologist.Has stopped drinking sodas. CBG log reveals CBGs in the mid 100s when checked qam upon rising.     Prostate Cancer Screening - Last PSA 0.2 on 22. Follow up annually.  Colon Cancer Screening - FIT negative on 22.  Eye Exam -   Dental Exam -  Vaccinations: Flu - declines / Covid - 3/16/21 & 21 / Pneumonia - / Shingles - / Tetanus - declines  Labwork - UTD         Review of Systems     Review of Systems   Constitutional: Negative.    HENT: Negative.    Eyes: Negative.    Respiratory: Negative.    Cardiovascular: Negative.    Gastrointestinal: Negative.    Endocrine: Negative.    Genitourinary: Negative.    Musculoskeletal: Negative.    Integumentary:  Negative.   Neurological: Negative.    Psychiatric/Behavioral: Negative.         Medical / Social / Family History   History reviewed. No pertinent past medical history.    Past Surgical History:   Procedure Laterality Date    Cardioversion x 2       Comprehensive electrophysiologic eval. including insertion & repositioning of multiple electrode catheters with induction or attempted induction of arrhythmias with left atrial pacing & recordingfrom coronary sinus or left atrium  07/10/2018    comprehensive electrophysiologic eval. insertion & repositioning multiple electrode catheters with induction or attempted induction of an arrhythmia with right atrial pacing and recording, right ventricular pacing and recording  07/10/2018    Destruction of Conduction Mechanism, Percutaneous Approach  07/10/2018       Social History    reports that he has been smoking cigars. He has never used smokeless tobacco. He reports current alcohol use. He reports that he does not use drugs.    Family History  's family history includes Hypertension in his mother; Valvular heart disease in his father.    Medications and Allergies     Medications  Outpatient Medications Marked as Taking for the 5/12/22 encounter (Office Visit) with MARIBELL Clifton   Medication Sig Dispense Refill    blood sugar diagnostic Strp   True Metrix Glucometer strips, See Instructions, Use to check blood glucose daily., # 100 EA, 11 Refill(s), Pharmacy: [facility], 177.8, cm, Height/Length Dosing, 01/12/22 9:55:00 CST, 148.4, kg, Weight Dosing, 01/12/22 9:55:00 CST      blood-glucose meter kit   True Metrix Glucometer, See Instructions, Use to check blood glucose daily, # 1 EA, 0 Refill(s), Pharmacy: [facility], 177.8, cm, Height/Length Dosing, 01/12/22 9:55:00 CST, 148.4, kg, Weight Dosing, 01/12/22 9:55:00 CST      EASY TOUCH TWIST LANCETS 33 gauge Misc USE AS DIRECTED TO CHECK BLOOD GLUCOSE DAILY      furosemide (LASIX) 40 MG tablet Take 40 mg by mouth 2 (two) times daily.      hydrALAZINE (APRESOLINE) 50 MG tablet Take 50 mg by mouth 2 (two) times daily.      KLOR-CON M20 20 mEq tablet Take 20 mEq by mouth once daily.       "metoprolol succinate (TOPROL-XL) 50 MG 24 hr tablet Take 50 mg by mouth once daily.      XARELTO 20 mg Tab TAKE 1 TABLET BY MOUTH ONCE DAILY WITH SUPPER         Allergies  Review of patient's allergies indicates:  No Known Allergies    Physical Examination   Vital Signs  Temp: 97.8 °F (36.6 °C)  Pulse: 82  Resp: 18  BP: (!) 134/92  BP Location: Left arm  Patient Position: Sitting  Pain Score: 0-No pain  Height and Weight  Height: 5' 10" (177.8 cm)  Weight: (!) 143.8 kg (317 lb)  BSA (Calculated - sq m): 2.66 sq meters  BMI (Calculated): 45.5  Weight in (lb) to have BMI = 25: 173.9]    Physical Exam  Constitutional:       Appearance: Normal appearance. He is morbidly obese.   Cardiovascular:      Rate and Rhythm: Normal rate and regular rhythm.      Pulses:           Dorsalis pedis pulses are 2+ on the right side and 2+ on the left side.        Posterior tibial pulses are 2+ on the right side and 2+ on the left side.   Pulmonary:      Effort: Pulmonary effort is normal.      Breath sounds: Normal breath sounds.   Musculoskeletal:         General: Normal range of motion.      Cervical back: Normal range of motion.        Feet:    Feet:      Right foot:      Protective Sensation: 9 sites tested. 9 sites sensed.      Skin integrity: Dry skin present.      Toenail Condition: Fungal disease present.     Left foot:      Protective Sensation: 9 sites tested. 9 sites sensed.      Skin integrity: Dry skin present.      Toenail Condition: Left toenails are normal.   Skin:     General: Skin is warm and dry.   Neurological:      General: No focal deficit present.      Mental Status: He is alert and oriented to person, place, and time.   Psychiatric:         Mood and Affect: Mood normal.           Results     Lab Results   Component Value Date    WBC 6.6 01/12/2022    RBC 5.02 01/12/2022    HGB 14.3 01/12/2022    HCT 44.9 01/12/2022    MCV 89.4 01/12/2022    MCH 28.5 01/12/2022    MCHC 31.8 01/12/2022    RDW 15.0 (H) 01/12/2022 "     01/12/2022    MPV 12.4 (H) 01/12/2022     Sodium Level   Date Value Ref Range Status   01/12/2022 140 136 - 145 mmol/L Final     Potassium Level   Date Value Ref Range Status   01/12/2022 4.0 3.5 - 5.1 mmol/L Final     Carbon Dioxide   Date Value Ref Range Status   01/12/2022 27 23 - 31 mmol/L Final     Blood Urea Nitrogen   Date Value Ref Range Status   01/12/2022 13.7 8.4 - 25.7 mg/dL Final     Creatinine   Date Value Ref Range Status   01/12/2022 1.64 (H) 0.73 - 1.18 mg/dL Final     Calcium Level Total   Date Value Ref Range Status   01/12/2022 9.8 8.7 - 10.5 mg/dL Final     Albumin Level   Date Value Ref Range Status   01/12/2022 3.8 3.4 - 4.8 gm/dL Final     Bilirubin Total   Date Value Ref Range Status   01/12/2022 0.3 <<=1.5 mg/dL Final     Alkaline Phosphatase   Date Value Ref Range Status   01/12/2022 119 40 - 150 unit/L Final     Aspartate Aminotransferase   Date Value Ref Range Status   01/12/2022 18 5 - 34 unit/L Final     Alanine Aminotransferase   Date Value Ref Range Status   01/12/2022 16 0 - 55 unit/L Final     Estimated GFR-Non    Date Value Ref Range Status   01/12/2022 45 (L) >>=90 mL/min/1.73 m2 Final     Lab Results   Component Value Date    CHOL 160 01/12/2022     Lab Results   Component Value Date    HDL 40 01/12/2022     No results found for: LDLCALC  Lab Results   Component Value Date    TRIG 86 01/12/2022     No results found for: CHOLHDL  Lab Results   Component Value Date    TSH 0.2130 (L) 01/12/2022     Lab Results   Component Value Date    PHUR 5.5 01/12/2022    PROTEINUA 2+ (A) 01/12/2022    GLUCUA Normal 01/12/2022    KETONESU Negative 01/12/2022    OCCULTUA Trace (A) 01/12/2022    NITRITE Negative 01/12/2022    LEUKOCYTESUR Negative 01/12/2022     Lab Results   Component Value Date    HGBA1C 7.6 (H) 01/12/2022     No results found for: MICROALBUR, [id number]   No results found for this or any previous visit.         Assessment and Plan (including Health  Maintenance)     Plan: Will notify of abn lab results/med changes if indicated. RTC 3 months and prn. Labs one week prior to appt.         Health Maintenance Due   Topic Date Due    Hepatitis C Screening  Never done    Pneumococcal Vaccines (Age 0-64) (1 - PCV) Never done    HIV Screening  Never done    TETANUS VACCINE  Never done    Colorectal Cancer Screening  Never done    Shingles Vaccine (1 of 2) Never done    COVID-19 Vaccine (3 - Booster for Moderna series) 09/13/2021       Problem List Items Addressed This Visit        Cardiac/Vascular    Atrial flutter    Current Assessment & Plan     Keep cardio appts/diagnostics as scheduled.  Denies any palpitations.  Continue amiodarone, xarelto and toprol as prescribed.             Hyperlipidemia    Current Assessment & Plan      / Trig 86 / HDL - 40  / Total chol - 160.  Refilled pravastatin.  Follow a low cholesterol, low saturated fat diet with less than 200 mg of cholesterol a day.   Avoid fried foods and high saturated fats (meadows, sausage, cookies, cakes, chips, cheese, whole milk, butter, mayonnaise, creamy dressings, gravy and cream sauces).  Add flax seed or fish oil supplements to diet.   Increase dietary fiber.   Regular exercise improves cholesterol levels.  Physical activity 5 times a week for 30 minutes per day (or 150 minutes per week).   Stressed importance of dietary modifications.    Continue pravastatin as prescribed.   Follow a low cholesterol, low saturated fat diet with less than 200 mg of cholesterol a day.   Avoid fried foods and high saturated fats (meadows, sausage, cookies, cakes, chips, cheese, whole milk, butter, mayonnaise, creamy dressings, gravy, stew, gumbo, boudin, cracklins and cream sauces).  Add flax seed or fish oil supplements to diet.   Increase dietary fiber.   Regular exercise improves cholesterol levels.  Physical activity 5 times a week for 30 minutes per day (or 150 minutes per week).   Stressed importance of  dietary modifications.             Hypertension    Current Assessment & Plan     Continue losartan and hydralazine.  Follow a low sodium (less than 2 grams of sodium per day), DASH diet.   Continue medications as prescribed.  Monitor blood pressure and report any consistent values greater than 140/90 and keep a log.  Encouraged smoking cessation to aid in BP reduction and co-morbidities.   Maintain healthy weight with a BMI goal of <30.   Aerobic exercise for 150 minutes per week (or 5 days a week for 30 minutes each day).                  Endocrine    Newly diagnosed diabetes    Current Assessment & Plan     A1C -did not have drawn prior to visit. Previous A1C 7.6.   Continue metformin  mg with supper.  Takes xarelto, losartan and pravastatin daily.  Continue medications as prescribed.  Follow ADA diet.  Avoid soda, simple sweets, and limit rice/pasta/bread/starches and consume brown options when possible.   Maintain healthy weight with BMI goal <30.   Perform aerobic exercise for 150 minutes per week (or 5 days a week for 30 minutes each day).   Examine feet daily.   Obtain annual dilated eye exam.  Eye exam: 5/12/22  Foot exam: 5/12/22             Relevant Orders    Comprehensive Metabolic Panel    Hemoglobin A1C    Body mass index (BMI) of 45.0 to 49.9 in adult    Current Assessment & Plan     BMI 45. Goal BMI <30.  Aerobic exercise 150 minutes per week.  Avoid soda, simple sugars, sweets, excessive rice, pasta, potatoes or bread.   Choose brown options when available and portion control.  Limit fast foods and fried foods.   Choose complex carbs in moderation (ex: green, leafy vegetables, beans, oatmeal).  Eat plenty of fresh fruits and vegetables with lean meats daily.   Consider permanent healthy lifestyle changes.             Subclinical hyperthyroidism    Current Assessment & Plan     TSH 0.2  Repeat TSH/Free T4 ordered.             Relevant Orders    TSH    T4, Free       Other    Tobacco user     Current Assessment & Plan     Only smokes a cigar every once in a while.  Smoking cessation discussed.   Discussed benefits of quitting including improved health, decreased cardiac/vascular/pulmonary/stroke risks as well as saving money.                   Health Maintenance Topics with due status: Not Due       Topic Last Completion Date    Lipid Panel 01/12/2022    Influenza Vaccine Not Due       No future appointments.         Signature:  MARIBELL Irizarry  [facility]  [address] [address] [address]    Date of encounter: 5/12/22

## 2022-05-12 NOTE — ASSESSMENT & PLAN NOTE
Continue losartan and hydralazine.  Follow a low sodium (less than 2 grams of sodium per day), DASH diet.   Continue medications as prescribed.  Monitor blood pressure and report any consistent values greater than 140/90 and keep a log.  Encouraged smoking cessation to aid in BP reduction and co-morbidities.   Maintain healthy weight with a BMI goal of <30.   Aerobic exercise for 150 minutes per week (or 5 days a week for 30 minutes each day).

## 2022-05-20 ENCOUNTER — OFFICE VISIT (OUTPATIENT)
Dept: CARDIOLOGY | Facility: CLINIC | Age: 62
End: 2022-05-20

## 2022-05-20 VITALS
HEIGHT: 70 IN | WEIGHT: 315 LBS | TEMPERATURE: 98 F | BODY MASS INDEX: 45.1 KG/M2 | HEART RATE: 84 BPM | RESPIRATION RATE: 18 BRPM | DIASTOLIC BLOOD PRESSURE: 88 MMHG | OXYGEN SATURATION: 99 % | SYSTOLIC BLOOD PRESSURE: 135 MMHG

## 2022-05-20 DIAGNOSIS — E78.2 MIXED HYPERLIPIDEMIA: ICD-10-CM

## 2022-05-20 DIAGNOSIS — I48.0 PAROXYSMAL ATRIAL FIBRILLATION: Primary | ICD-10-CM

## 2022-05-20 DIAGNOSIS — E11.9 DIABETES MELLITUS WITHOUT COMPLICATION: ICD-10-CM

## 2022-05-20 DIAGNOSIS — R06.09 DOE (DYSPNEA ON EXERTION): ICD-10-CM

## 2022-05-20 DIAGNOSIS — Z72.0 TOBACCO USER: ICD-10-CM

## 2022-05-20 DIAGNOSIS — I10 PRIMARY HYPERTENSION: ICD-10-CM

## 2022-05-20 PROCEDURE — 99214 PR OFFICE/OUTPT VISIT, EST, LEVL IV, 30-39 MIN: ICD-10-PCS | Mod: 25,S$PBB,, | Performed by: NURSE PRACTITIONER

## 2022-05-20 PROCEDURE — 93005 ELECTROCARDIOGRAM TRACING: CPT

## 2022-05-20 PROCEDURE — 99214 OFFICE O/P EST MOD 30 MIN: CPT | Mod: 25,S$PBB,, | Performed by: NURSE PRACTITIONER

## 2022-05-20 PROCEDURE — 99214 OFFICE O/P EST MOD 30 MIN: CPT | Mod: PBBFAC | Performed by: NURSE PRACTITIONER

## 2022-05-20 RX ORDER — PRAVASTATIN SODIUM 40 MG/1
40 TABLET ORAL NIGHTLY
Qty: 90 TABLET | Refills: 3 | Status: SHIPPED | OUTPATIENT
Start: 2022-05-20 | End: 2023-03-14 | Stop reason: SDUPTHER

## 2022-05-20 RX ORDER — METOPROLOL SUCCINATE 50 MG/1
50 TABLET, EXTENDED RELEASE ORAL DAILY
Qty: 90 TABLET | Refills: 3 | Status: SHIPPED | OUTPATIENT
Start: 2022-05-20 | End: 2023-03-14 | Stop reason: SDUPTHER

## 2022-05-20 RX ORDER — POTASSIUM CHLORIDE 1500 MG/1
20 TABLET, EXTENDED RELEASE ORAL DAILY
Qty: 90 TABLET | Refills: 3 | Status: SHIPPED | OUTPATIENT
Start: 2022-05-20 | End: 2023-03-14 | Stop reason: SDUPTHER

## 2022-05-20 RX ORDER — LOSARTAN POTASSIUM 25 MG/1
25 TABLET ORAL DAILY
Qty: 90 TABLET | Refills: 3 | Status: SHIPPED | OUTPATIENT
Start: 2022-05-20 | End: 2022-06-09 | Stop reason: ALTCHOICE

## 2022-05-20 RX ORDER — HYDRALAZINE HYDROCHLORIDE 50 MG/1
50 TABLET, FILM COATED ORAL 2 TIMES DAILY
Qty: 180 TABLET | Refills: 3 | Status: SHIPPED | OUTPATIENT
Start: 2022-05-20 | End: 2023-03-14 | Stop reason: SDUPTHER

## 2022-05-20 RX ORDER — RIVAROXABAN 20 MG/1
20 TABLET, FILM COATED ORAL DAILY
Qty: 90 TABLET | Refills: 1 | Status: SHIPPED | OUTPATIENT
Start: 2022-05-20 | End: 2022-09-29 | Stop reason: SDUPTHER

## 2022-05-20 RX ORDER — AMIODARONE HYDROCHLORIDE 200 MG/1
200 TABLET ORAL DAILY
Qty: 90 TABLET | Refills: 3 | Status: SHIPPED | OUTPATIENT
Start: 2022-05-20 | End: 2023-03-17

## 2022-05-20 RX ORDER — FUROSEMIDE 40 MG/1
40 TABLET ORAL 2 TIMES DAILY
Qty: 180 TABLET | Refills: 3 | Status: SHIPPED | OUTPATIENT
Start: 2022-05-20 | End: 2023-03-14 | Stop reason: SDUPTHER

## 2022-05-20 NOTE — PROGRESS NOTES
CHIEF COMPLAINT:   Chief Complaint   Patient presents with    Follow-up     Patient here for 6 mth f/u w labs. Pt denies headaches, dizziness, palpitations, and chest pain. Pt c/o sob while ambulating. +1 edema to left ankle. No questions.                    Review of patient's allergies indicates:  No Known Allergies                                       HPI:  Jamey Duke 61 y.o. AAM with hx HTN, HLD, h/o persistent AF/AFL, DEEP, and h/o NICMP (EF -45% per TTE 1/2020).The patient underwent multiple attempted DCCVs but has had recurrence of AFL. He was a former patient of Dr. Gutierrez and Dr. Syed. Latest echocardiogram completed in January 2020 revealed an ejection fraction of approximately 45%. He also had a normal perfusion study in December 2018.     Today the patient endorses ongoing shortness of breath with exertion.  He denies any chest pain, palpitations, orthopnea, PND, claudication symptoms, lightheadedness, dizziness or syncope.  He endorses occasional peripheral edema that is usually controlled with his diuretic.  He states he is able to perform his normal ADLs and yard work without chest pain or significant shortness of breath, if he does it at his own pace.  He reports compliance with his current medication regimen, but is requesting refills.     PMH: HTN, HLD, h/o persistent AF/AFL, and h/o NICMP  PSH: ablation  Social History: Denies any illicit drug, tobacco use-smokes occasional cigar . Reports occasional ETOH use  Family History: Father -leaky valve    Previous Diagnostics:     TTE 1.24.20    1. The study quality is below average.  2. The left ventricle is normal in size. Global left ventricular systolic function is mildly decreased. The left ventricular ejection fraction is 45%.  3. The left atrial diameter is mildly increased. The right atrium is mildly enlarged.  4. Mild (1+) mitral regurgitation. Mild (1+) tricuspid regurgitation.  5. The pulmonary artery systolic pressure is 35  mmHg.     PET 12.6.18   This is a normal perfusion study, no perfusion defects noted.   This scan is suggestive of low risk for future cardiovascular events.   The left ventricular cavity is noted to be moderately enlarged on the stress studies. The stress left ventricular ejection fraction was calculated to be 45% and left ventricular global function is mildly reduced. The rest left ventricular cavity is noted to be mildly enlarged. The rest left ventricular ejection fraction was calculated to be 39% and rest left ventricular global function is moderately reduced.   When compared to the resting ejection fraction (39%), the stress ejection fraction (45%) has increased.   The study quality is good.    Carotid US 2.9.18  1. The study quality is below average.  2. 1-39% stenosis in the proximal right internal carotid artery based on Bluth Criteria.  3. Antegrade right vertebral artery flow.  4. Antegrade left vertebral artery flow    [facility] 1.3.17  EF -20%  Normal coronaries                                                                                                                                                                                                                                                                                                                                                                                                                                              Patient Active Problem List   Diagnosis    Atrial flutter    Hyperlipidemia    Hypertension    Newly diagnosed diabetes    Tobacco user    Body mass index (BMI) of 45.0 to 49.9 in adult    Subclinical hyperthyroidism    Other male erectile dysfunction     Past Surgical History:   Procedure Laterality Date    Cardioversion x 2      Comprehensive electrophysiologic eval. including insertion & repositioning of multiple electrode catheters with induction or attempted induction of arrhythmias with left atrial pacing &  recordingfrom coronary sinus or left atrium  07/10/2018    comprehensive electrophysiologic eval. insertion & repositioning multiple electrode catheters with induction or attempted induction of an arrhythmia with right atrial pacing and recording, right ventricular pacing and recording  07/10/2018    Destruction of Conduction Mechanism, Percutaneous Approach  07/10/2018     Social History     Socioeconomic History    Marital status: Single   Occupational History    Occupation: retired   Tobacco Use    Smoking status: Current Some Day Smoker     Types: Cigars    Smokeless tobacco: Never Used    Tobacco comment: Occasionally   Substance and Sexual Activity    Alcohol use: Yes     Comment: Drinks occasionally beer    Drug use: Never    Sexual activity: Yes     Partners: Female        Family History   Problem Relation Age of Onset    Hypertension Mother     Valvular heart disease Father          Current Outpatient Medications:     blood sugar diagnostic Strp, Inject 1 strip into the skin 2 (two) times a day., Disp: 100 strip, Rfl: 11    blood-glucose meter kit,  True Metrix Glucometer, See Instructions, Use to check blood glucose daily, # 1 EA, 0 Refill(s), Pharmacy: [facility], 177.8, cm, Height/Length Dosing, 01/12/22 9:55:00 CST, 148.4, kg, Weight Dosing, 01/12/22 9:55:00 CST, Disp: , Rfl:     EASY TOUCH TWIST LANCETS 33 gauge Misc, USE AS DIRECTED TO CHECK BLOOD GLUCOSE DAILY, Disp: , Rfl:     furosemide (LASIX) 40 MG tablet, Take 1 tablet (40 mg total) by mouth 2 (two) times daily., Disp: 90 tablet, Rfl: 1    hydrALAZINE (APRESOLINE) 50 MG tablet, Take 1 tablet (50 mg total) by mouth 2 (two) times daily., Disp: 180 tablet, Rfl: 1    KLOR-CON M20 20 mEq tablet, Take 1 tablet (20 mEq total) by mouth once daily., Disp: 90 tablet, Rfl: 1    losartan (COZAAR) 25 MG tablet, Take 1 tablet (25 mg total) by mouth once daily., Disp: 90 tablet, Rfl: 1    metoprolol succinate  "(TOPROL-XL) 50 MG 24 hr tablet, Take 1 tablet (50 mg total) by mouth once daily., Disp: 90 tablet, Rfl: 1    pravastatin (PRAVACHOL) 40 MG tablet, Take 1 tablet (40 mg total) by mouth every evening., Disp: 90 tablet, Rfl: 1    sildenafiL (VIAGRA) 25 MG tablet, Take 1 tablet (25 mg total) by mouth daily as needed for Erectile Dysfunction., Disp: 15 tablet, Rfl: 1    XARELTO 20 mg Tab, Take 1 tablet (20 mg total) by mouth once daily., Disp: 90 tablet, Rfl: 1     ROS:                                                                                                                                                                             Review of Systems   Constitutional: Negative.    HENT: Negative.    Eyes: Negative.    Respiratory: Positive for shortness of breath.    Cardiovascular: Negative.    Gastrointestinal: Negative.    Genitourinary: Negative.    Musculoskeletal: Negative.    Skin: Negative.    Neurological: Negative.    Endo/Heme/Allergies: Negative.    Psychiatric/Behavioral: Negative.         Blood pressure 135/88, pulse 84, temperature 97.5 °F (36.4 °C), resp. rate 18, height 5' 10" (1.778 m), weight (!) 144.5 kg (318 lb 9 oz), SpO2 99 %.   PE:  Physical Exam  Constitutional:       Appearance: Normal appearance.   HENT:      Head: Normocephalic.   Eyes:      Pupils: Pupils are equal, round, and reactive to light.   Cardiovascular:      Rate and Rhythm: Normal rate and regular rhythm.      Pulses: Normal pulses.   Pulmonary:      Effort: Pulmonary effort is normal.   Musculoskeletal:         General: Swelling present. Normal range of motion.   Skin:     General: Skin is warm and dry.   Neurological:      General: No focal deficit present.      Mental Status: He is alert and oriented to person, place, and time.   Psychiatric:         Mood and Affect: Mood normal.         Behavior: Behavior normal.         ASSESSMENT/PLAN:  h/o persistent AF/AFL Status post RFA on (7.10.18)   - Denies palpitations  - Has " multiple attempted DCCVs but has had recurrence of AFL  - Continue with amiodarone 200 mg p.o. daily and Toprol succinate 50qd  - Latest CXR for Amio monitoring -stable, TSH normal  - Continue Xarelto 20 mg p.o. daily. Denies any adverse bleeding issues  - EKG     NICMP  - Reports ongoing SOB. Denies CP  - LVEF - 45%, Mild MR, Mild TR (1.24.20)  - Normal perfusion study, no perfusion defects noted (12.6.18)  - LHC- EF-20%, Normal coronaries (1.3.17)  - Euvolemic upon exam warm and dry  - Continue GDMT: Toprol succinate 50 daily and losartan. Intolerance to lisinopril secondary to a cough.  - Continue Lasix 40 as directed  - Counseled patient on heart healthy, low-sodium diet and activity as tolerated  - Repeat Echocardiogram to assess LV function     HTN-at goal   - Continue current regimen  - Counseled on low-sodium diet    Diabetes  - A1C- 6.3  - Continue management per PCP     HLD- LDL -103- improved   - Continue Pravastatin 40 mg po daily.  - Counseled patient on low-cholesterol, low-fat diet, and encourage exercise as tolerated.    Carotid Artery disease  - 1-39% stenosis in the proximal right internal carotid artery based on Bluth Criteria. (2018)  - Denies any dizziness, syncope or focal weakness    Tobacco use  - Reports occasional cigar use   - Counseled on importance of continued smoking cessation    EKG  Echocardiogram   Follow-up in cardiology clinic in 6 months or sooner if needed  Follow up with PCP as directed

## 2022-05-20 NOTE — PATIENT INSTRUCTIONS
EKG  Echocardiogram   Follow-up in cardiology clinic in 6 months or sooner if needed  Follow up with PCP as directed

## 2022-05-27 ENCOUNTER — HOSPITAL ENCOUNTER (OUTPATIENT)
Dept: CARDIOLOGY | Facility: HOSPITAL | Age: 62
Discharge: HOME | End: 2022-05-27
Attending: NURSE PRACTITIONER

## 2022-05-27 VITALS
SYSTOLIC BLOOD PRESSURE: 157 MMHG | WEIGHT: 315 LBS | DIASTOLIC BLOOD PRESSURE: 112 MMHG | HEIGHT: 70 IN | BODY MASS INDEX: 45.1 KG/M2

## 2022-05-27 DIAGNOSIS — R06.09 DOE (DYSPNEA ON EXERTION): ICD-10-CM

## 2022-05-27 PROCEDURE — C8929 TTE W OR WO FOL WCON,DOPPLER: HCPCS

## 2022-05-27 PROCEDURE — 25500020 PHARM REV CODE 255: Performed by: INTERNAL MEDICINE

## 2022-05-27 RX ADMIN — HUMAN ALBUMIN MICROSPHERES AND PERFLUTREN 0.44 MG: 10; .22 INJECTION, SOLUTION INTRAVENOUS at 08:05

## 2022-05-30 LAB
AV INDEX (PROSTH): 0.89
AV MEAN GRADIENT: 2 MMHG
AV PEAK GRADIENT: 3 MMHG
AV VALVE AREA: 2.67 CM2
AV VELOCITY RATIO: 0.78
BSA FOR ECHO PROCEDURE: 2.67 M2
CV ECHO LV RWT: 0.38 CM
DOP CALC AO PEAK VEL: 0.91 M/S
DOP CALC AO VTI: 16.8 CM
DOP CALC LVOT AREA: 3 CM2
DOP CALC LVOT DIAMETER: 1.96 CM
DOP CALC LVOT PEAK VEL: 0.71 M/S
DOP CALC LVOT STROKE VOLUME: 44.93 CM3
DOP CALC MV VTI: 25.7 CM
DOP CALCLVOT PEAK VEL VTI: 14.9 CM
E WAVE DECELERATION TIME: 135.75 MSEC
E/A RATIO: 114
E/E' RATIO: 15.2 M/S
ECHO LV POSTERIOR WALL: 1.05 CM (ref 0.6–1.1)
EJECTION FRACTION: 35 %
FRACTIONAL SHORTENING: 16 % (ref 28–44)
INTERVENTRICULAR SEPTUM: 1.25 CM (ref 0.6–1.1)
LEFT ATRIUM SIZE: 3.78 CM
LEFT INTERNAL DIMENSION IN SYSTOLE: 4.59 CM (ref 2.1–4)
LEFT VENTRICLE DIASTOLIC VOLUME INDEX: 57.68 ML/M2
LEFT VENTRICLE DIASTOLIC VOLUME: 146.51 ML
LEFT VENTRICLE MASS INDEX: 101 G/M2
LEFT VENTRICLE SYSTOLIC VOLUME INDEX: 38 ML/M2
LEFT VENTRICLE SYSTOLIC VOLUME: 96.61 ML
LEFT VENTRICULAR INTERNAL DIMENSION IN DIASTOLE: 5.49 CM (ref 3.5–6)
LEFT VENTRICULAR MASS: 256.24 G
LV LATERAL E/E' RATIO: 11.4 M/S
LV SEPTAL E/E' RATIO: 22.8 M/S
LVOT MG: 1.27 MMHG
LVOT MV: 0.54 CM/S
MV MEAN GRADIENT: 3 MMHG
MV PEAK A VEL: 0.01 M/S
MV PEAK E VEL: 1.14 M/S
MV PEAK GRADIENT: 8 MMHG
MV STENOSIS PRESSURE HALF TIME: 39.37 MS
MV VALVE AREA BY CONTINUITY EQUATION: 1.75 CM2
MV VALVE AREA P 1/2 METHOD: 5.59 CM2
PISA TR MAX VEL: 2.67 M/S
RA PRESSURE: 8 MMHG
RIGHT VENTRICULAR END-DIASTOLIC DIMENSION: 2.67 CM
TDI LATERAL: 0.1 M/S
TDI SEPTAL: 0.05 M/S
TDI: 0.08 M/S
TR MAX PG: 29 MMHG
TV REST PULMONARY ARTERY PRESSURE: 37 MMHG

## 2022-06-08 NOTE — PROGRESS NOTES
CHIEF COMPLAINT:   No chief complaint on file.                  Review of patient's allergies indicates:  No Known Allergies                                       HPI:  Jamey Duke 61 y.o. AAM with hx HTN, HLD, h/o persistent AF/AFL, DEEP, and h/o NICMP (EF -45% per TTE 1/2020).The patient underwent multiple attempted DCCVs but has had recurrence of AFL. He was a former patient of Dr. Gutierrez and Dr. Syed. He also had a normal perfusion study in December 2018. Patient presents to clinic today for results of Echocardiogram. The patient completed an echocardiogram on 5.27.22 that revealed an ejection fraction of 35% which is reduced from previous EF of 45% per echo in January 2020.     Today he continues to endorse shortness of breath with moderate exertion, however he states he is able to perform his normal ADLs without issue.  She denies any chest pain, palpitations, orthopnea, PND, claudication symptoms or syncope.  He also continues to endorse occasional peripheral edema that is managed with diuretic.      PMH: HTN, HLD, h/o persistent AF/AFL, and h/o NICMP  PSH: ablation  Social History: Denies any illicit drug, tobacco use-smokes occasional cigar . Reports occasional ETOH use  Family History: Father -leaky valve    Previous Diagnostics:   ECHO 5.27.22    · The left ventricle is normal in size with moderately decreased systolic function.  · The estimated ejection fraction is 35%.  · Atrial fibrillation observed.  · Mild mitral regurgitation.  · Mild tricuspid regurgitation.  · IVC is dilated.IVC collapses >50%.  · Intermediate central venous pressure (8 mmHg).  · The estimated PA systolic pressure is 37 mmHg.  · Normal right ventricular size with normal right ventricular systolic function.  · There is no pulmonary hypertension.       TTE 1.24.20    1. The study quality is below average.  2. The left ventricle is normal in size. Global left ventricular systolic function is mildly decreased. The left  ventricular ejection fraction is 45%.  3. The left atrial diameter is mildly increased. The right atrium is mildly enlarged.  4. Mild (1+) mitral regurgitation. Mild (1+) tricuspid regurgitation.  5. The pulmonary artery systolic pressure is 35 mmHg.     PET 12.6.18   This is a normal perfusion study, no perfusion defects noted.   This scan is suggestive of low risk for future cardiovascular events.   The left ventricular cavity is noted to be moderately enlarged on the stress studies. The stress left ventricular ejection fraction was calculated to be 45% and left ventricular global function is mildly reduced. The rest left ventricular cavity is noted to be mildly enlarged. The rest left ventricular ejection fraction was calculated to be 39% and rest left ventricular global function is moderately reduced.   When compared to the resting ejection fraction (39%), the stress ejection fraction (45%) has increased.   The study quality is good.    Carotid US 2.9.18  1. The study quality is below average.  2. 1-39% stenosis in the proximal right internal carotid artery based on Bluth Criteria.  3. Antegrade right vertebral artery flow.  4. Antegrade left vertebral artery flow    [facility] 1.3.17  EF -20%  Normal coronaries                                                                                                                                                                                                                                                                                                                                                                                                                                              Patient Active Problem List   Diagnosis    Atrial flutter    Hyperlipidemia    Hypertension    Newly diagnosed diabetes    Tobacco user    Body mass index (BMI) of 45.0 to 49.9 in adult    Subclinical hyperthyroidism    Other male erectile dysfunction    Acute systolic heart  failure     Past Surgical History:   Procedure Laterality Date    Cardioversion x 2      Comprehensive electrophysiologic eval. including insertion & repositioning of multiple electrode catheters with induction or attempted induction of arrhythmias with left atrial pacing & recordingfrom coronary sinus or left atrium  07/10/2018    comprehensive electrophysiologic eval. insertion & repositioning multiple electrode catheters with induction or attempted induction of an arrhythmia with right atrial pacing and recording, right ventricular pacing and recording  07/10/2018    Destruction of Conduction Mechanism, Percutaneous Approach  07/10/2018     Social History     Socioeconomic History    Marital status: Single   Occupational History    Occupation: retired   Tobacco Use    Smoking status: Current Some Day Smoker     Types: Cigars    Smokeless tobacco: Never Used    Tobacco comment: Occasionally   Substance and Sexual Activity    Alcohol use: Yes     Comment: Drinks occasionally beer    Drug use: Never    Sexual activity: Yes     Partners: Female        Family History   Problem Relation Age of Onset    Hypertension Mother     Valvular heart disease Father          Current Outpatient Medications:     amiodarone (PACERONE) 200 MG Tab, Take 1 tablet (200 mg total) by mouth once daily., Disp: 90 tablet, Rfl: 3    blood sugar diagnostic Strp, Inject 1 strip into the skin 2 (two) times a day., Disp: 100 strip, Rfl: 11    blood-glucose meter kit,  True Metrix Glucometer, See Instructions, Use to check blood glucose daily, # 1 EA, 0 Refill(s), Pharmacy: [facility], 177.8, cm, Height/Length Dosing, 01/12/22 9:55:00 CST, 148.4, kg, Weight Dosing, 01/12/22 9:55:00 CST, Disp: , Rfl:     EASY TOUCH TWIST LANCETS 33 gauge Misc, USE AS DIRECTED TO CHECK BLOOD GLUCOSE DAILY, Disp: , Rfl:     furosemide (LASIX) 40 MG tablet, Take 1 tablet (40 mg total) by mouth 2 (two) times daily., Disp: 180  "tablet, Rfl: 3    hydrALAZINE (APRESOLINE) 50 MG tablet, Take 1 tablet (50 mg total) by mouth 2 (two) times daily., Disp: 180 tablet, Rfl: 3    KLOR-CON M20 20 mEq tablet, Take 1 tablet (20 mEq total) by mouth once daily., Disp: 90 tablet, Rfl: 3    metoprolol succinate (TOPROL-XL) 50 MG 24 hr tablet, Take 1 tablet (50 mg total) by mouth once daily., Disp: 90 tablet, Rfl: 3    pravastatin (PRAVACHOL) 40 MG tablet, Take 1 tablet (40 mg total) by mouth every evening., Disp: 90 tablet, Rfl: 3    XARELTO 20 mg Tab, Take 1 tablet (20 mg total) by mouth once daily., Disp: 90 tablet, Rfl: 1    sacubitriL-valsartan (ENTRESTO) 24-26 mg per tablet, Take 1 tablet by mouth 2 (two) times daily., Disp: 60 tablet, Rfl: 11    sildenafiL (VIAGRA) 25 MG tablet, Take 1 tablet (25 mg total) by mouth daily as needed for Erectile Dysfunction. (Patient not taking: Reported on 6/9/2022), Disp: 15 tablet, Rfl: 1     ROS:                                                                                                                                                                             Review of Systems   Constitutional: Negative.    HENT: Negative.    Eyes: Negative.    Respiratory: Positive for shortness of breath.    Cardiovascular: Positive for leg swelling.   Gastrointestinal: Negative.    Genitourinary: Negative.    Musculoskeletal: Negative.    Skin: Negative.    Neurological: Negative.    Endo/Heme/Allergies: Negative.    Psychiatric/Behavioral: Negative.         Blood pressure (!) 140/92, pulse 64, resp. rate 20, height 5' 9.69" (1.77 m), weight (!) 143.1 kg (315 lb 7.7 oz), SpO2 98 %.   PE:  Physical Exam  Constitutional:       Appearance: Normal appearance.   HENT:      Head: Normocephalic.   Eyes:      Pupils: Pupils are equal, round, and reactive to light.   Cardiovascular:      Rate and Rhythm: Normal rate and regular rhythm.      Pulses: Normal pulses.   Pulmonary:      Effort: Pulmonary effort is normal. "   Musculoskeletal:         General: Swelling present. Normal range of motion.   Skin:     General: Skin is warm and dry.   Neurological:      General: No focal deficit present.      Mental Status: He is alert and oriented to person, place, and time.   Psychiatric:         Mood and Affect: Mood normal.         Behavior: Behavior normal.         ASSESSMENT/PLAN:  h/o persistent AF/AFL Status post RFA on (7.10.18)   - Denies palpitations  - Has multiple attempted DCCVs but has had recurrence of AFL  - Continue with amiodarone 200 mg p.o. daily and Toprol succinate 50qd  - Latest CXR for Amio monitoring -stable, TSH normal  - Continue Xarelto 20 mg p.o. daily. Denies any adverse bleeding issues  - Last EKG - Atrial flutter   - Discussed plan of care with staff cardiologist, Dr. Moilna. Will continue with amiodarone for now, with plans for DCCV. If DCCV fails, then will plan to refer patient to [facility] for ablation for persistent AF/AFL    NICMP/HFrEF  - LVEF - 35% per TTE 5.27.22 reduced from 45% per TTE (1.24.20)  - Reports ongoing SOB. Denies CP  - Normal perfusion study, no perfusion defects noted (12.6.18)  - [facility]- EF-20%, Normal coronaries (1.3.17)  - Euvolemic upon exam warm and dry  - Continue GDMT: Toprol succinate 50 daily. Intolerance to lisinopril secondary to a cough. Will trial patient on Entresto 24/26 mg BID. Discontinue Losartan   - Nurse visit in 2 weeks for BP check   - BMP in  2 weeks. Will monitor renal indices closely   - Continue Lasix 40 as directed  - Counseled patient on heart healthy, low-sodium diet and activity as tolerated  - Lexiscan stress test to assess for ischemic etiology of LV reduction. Has multiple risk factor HTN, DM, HLD, and tobacco use     HTN-above goal   - Continue Toprol. Discontinue losartan, starting Entresto   - Counseled on low-sodium diet    Diabetes  - A1C- 6.3  - Continue management per PCP     HLD- LDL -103- improved   - Continue Pravastatin 40 mg po daily.  - Counseled  patient on low-cholesterol, low-fat diet, and encourage exercise as tolerated.    Carotid Artery disease  - 1-39% stenosis in the proximal right internal carotid artery based on Bluth Criteria. (2018)  - Denies any dizziness, syncope or focal weakness    Tobacco use  - Reports occasional cigar use. Reports last February 2022  - Counseled on importance of smoking cessation    Lexiscan stress test   Nurse visit in 2 weeks for BP Check   BMP in 2 weeks   Follow-up in cardiology clinic in 3 months or sooner pending results   Follow up with PCP as directed

## 2022-06-09 ENCOUNTER — OFFICE VISIT (OUTPATIENT)
Dept: CARDIOLOGY | Facility: CLINIC | Age: 62
End: 2022-06-09

## 2022-06-09 VITALS
SYSTOLIC BLOOD PRESSURE: 140 MMHG | WEIGHT: 315 LBS | BODY MASS INDEX: 45.1 KG/M2 | RESPIRATION RATE: 20 BRPM | HEIGHT: 70 IN | DIASTOLIC BLOOD PRESSURE: 92 MMHG | OXYGEN SATURATION: 98 % | HEART RATE: 64 BPM

## 2022-06-09 DIAGNOSIS — I50.21 ACUTE SYSTOLIC HEART FAILURE: Primary | ICD-10-CM

## 2022-06-09 DIAGNOSIS — I48.92 ATRIAL FLUTTER, UNSPECIFIED TYPE: ICD-10-CM

## 2022-06-09 DIAGNOSIS — I10 PRIMARY HYPERTENSION: ICD-10-CM

## 2022-06-09 DIAGNOSIS — E78.2 MIXED HYPERLIPIDEMIA: ICD-10-CM

## 2022-06-09 DIAGNOSIS — E11.9 DIABETES MELLITUS WITHOUT COMPLICATION: ICD-10-CM

## 2022-06-09 DIAGNOSIS — I42.8 OTHER CARDIOMYOPATHY: ICD-10-CM

## 2022-06-09 PROCEDURE — 99215 OFFICE O/P EST HI 40 MIN: CPT | Mod: PBBFAC | Performed by: NURSE PRACTITIONER

## 2022-06-09 PROCEDURE — 99214 PR OFFICE/OUTPT VISIT, EST, LEVL IV, 30-39 MIN: ICD-10-PCS | Mod: S$PBB,,, | Performed by: NURSE PRACTITIONER

## 2022-06-09 PROCEDURE — 99214 OFFICE O/P EST MOD 30 MIN: CPT | Mod: S$PBB,,, | Performed by: NURSE PRACTITIONER

## 2022-06-09 RX ORDER — SACUBITRIL AND VALSARTAN 24; 26 MG/1; MG/1
1 TABLET, FILM COATED ORAL 2 TIMES DAILY
Qty: 60 TABLET | Refills: 11 | Status: SHIPPED | OUTPATIENT
Start: 2022-06-09 | End: 2022-09-29

## 2022-06-09 NOTE — PATIENT INSTRUCTIONS
Lexiscan stress test   Nurse visit in 2 weeks for BP Check   BMP in 2 weeks   Follow-up in cardiology clinic in 3 months or sooner pending results   Follow up with PCP as directed

## 2022-06-21 ENCOUNTER — TELEPHONE (OUTPATIENT)
Dept: INTERNAL MEDICINE | Facility: CLINIC | Age: 62
End: 2022-06-21

## 2022-06-21 DIAGNOSIS — E11.9 NEWLY DIAGNOSED DIABETES: Primary | ICD-10-CM

## 2022-06-21 NOTE — TELEPHONE ENCOUNTER
Patient had a diabetic eye exam at his appt on 5-12-22.  Please sign the order so I can scan in the results.   Order prepped.

## 2022-06-30 ENCOUNTER — HOSPITAL ENCOUNTER (OUTPATIENT)
Dept: CARDIOLOGY | Facility: HOSPITAL | Age: 62
Discharge: HOME | End: 2022-06-30
Attending: NURSE PRACTITIONER

## 2022-06-30 ENCOUNTER — HOSPITAL ENCOUNTER (OUTPATIENT)
Dept: RADIOLOGY | Facility: HOSPITAL | Age: 62
Discharge: HOME | End: 2022-06-30
Attending: NURSE PRACTITIONER

## 2022-06-30 VITALS — HEART RATE: 134 BPM | SYSTOLIC BLOOD PRESSURE: 141 MMHG | DIASTOLIC BLOOD PRESSURE: 111 MMHG | RESPIRATION RATE: 20 BRPM

## 2022-06-30 DIAGNOSIS — I50.21 ACUTE SYSTOLIC HEART FAILURE: ICD-10-CM

## 2022-06-30 LAB
CV STRESS BASE HR: 136 BPM
DIASTOLIC BLOOD PRESSURE: 111 MMHG
NUC REST EJECTION FRACTION: 36
NUC STRESS EJECTION FRACTION: 33 %
OHS CV CPX 85 PERCENT MAX PREDICTED HEART RATE MALE: 133
OHS CV CPX ESTIMATED METS: 1
OHS CV CPX MAX PREDICTED HEART RATE: 157
OHS CV CPX PATIENT IS FEMALE: 0
OHS CV CPX PATIENT IS MALE: 1
OHS CV CPX PEAK DIASTOLIC BLOOD PRESSURE: 101 MMHG
OHS CV CPX PEAK HEAR RATE: 151 BPM
OHS CV CPX PEAK RATE PRESSURE PRODUCT: NORMAL
OHS CV CPX PEAK SYSTOLIC BLOOD PRESSURE: 144 MMHG
OHS CV CPX PERCENT MAX PREDICTED HEART RATE ACHIEVED: 96
OHS CV CPX RATE PRESSURE PRODUCT PRESENTING: NORMAL
STRESS ECHO POST EXERCISE DUR MIN: 2 MINUTES
STRESS ECHO POST EXERCISE DUR SEC: 55 SECONDS
SYSTOLIC BLOOD PRESSURE: 141 MMHG

## 2022-06-30 PROCEDURE — A9502 TC99M TETROFOSMIN: HCPCS

## 2022-06-30 PROCEDURE — 63600175 PHARM REV CODE 636 W HCPCS: Performed by: NURSE PRACTITIONER

## 2022-06-30 RX ORDER — REGADENOSON 0.08 MG/ML
0.4 INJECTION, SOLUTION INTRAVENOUS
Status: COMPLETED | OUTPATIENT
Start: 2022-06-30 | End: 2022-06-30

## 2022-06-30 RX ADMIN — REGADENOSON 0.4 MG: 0.08 INJECTION, SOLUTION INTRAVENOUS at 07:06

## 2022-07-11 NOTE — PROGRESS NOTES
CHIEF COMPLAINT:   Chief Complaint   Patient presents with    Here for test results. States feeling well since last appoi     Denies chest pain, pressure, occasional SOB with activity. Did not start Entresto secondary to cost.                   Review of patient's allergies indicates:  No Known Allergies                                       HPI:  Jamey Duke 61 y.o. AAM with hx HTN, HLD, h/o persistent AF/AFL, DEEP, and h/o NICMP (EF -45% per TTE 1/2020).The patient underwent multiple attempted DCCVs but has had recurrence of AFL. He was a former patient of Dr. Gutierrez and Dr. Syed. He also had a normal perfusion study in December 2018. The patient presents to clinic today for results of testing.  He completed an echocardiogram on 5.27.22 that revealed an ejection fraction of 35% which is reduced from previous EF of 45% per echo in January 2020. He completed a subsequent Lexiscan stress test on 6.30.22 that was abnormal revealing  moderate to severe intensity, moderate to large sized, reversible perfusion abnormality that is consistent with ischemia in the basal to apical lateral apical wall(s) in the typical distribution of the LCX territory and a small to moderate sized, moderate intensity, reversible perfusion abnormality that is consistent with ischemia in the basal to mid anterior wall(s) in the typical distribution of the LAD territory.  Today the patient continues to endorse exertional dyspnea with his regular activity.  He reports that he was working on his trailer yesterday and experienced moderate exertional dyspnea.  He denies any chest pain, orthopnea, PND, claudication symptoms or syncope.  He continues to endorse occasional episodes of peripheral edema that is controlled with diuretic.  Of note, the patient reports that he was unable to start the Entresto that was prescribed at the last clinic visit secondary to cost.    PMH: HTN, HLD, h/o persistent AF/AFL, and h/o NICMP  PSH:  ablation  Social History: Denies any illicit drug, tobacco use-smokes occasional cigar . Reports occasional ETOH use  Family History: Father -leaky valve    Previous Diagnostics:   Lexiscan stress test 6.20.22      Abnormal myocardial perfusion scan.    There are two significant perfusion abnormalities.    Perfusion Abnormality #1 - There is a moderate to severe intensity, moderate to large sized, reversible perfusion abnormality that is consistent with ischemia in the basal to apical lateral apical wall(s) in the typical distribution of the LCX territory.    Perfusion Abnormality #2 - There is a small to moderate sized, moderate intensity, reversible perfusion abnormality that is consistent with ischemia in the basal to mid anterior wall(s) in the typical distribution of the LAD territory.    There are no other significant perfusion abnormalities.    The gated perfusion images showed an ejection fraction of 36% at rest. The gated perfusion images showed an ejection fraction of 33% post stress.    The EKG portion of this study is abnormal but not diagnostic.    The patient reported no chest pain during the stress test.    There were no arrhythmias during stress.     If clinically indicated, consider further evaluation with coronary angiogram.          ECHO 5.27.22    · The left ventricle is normal in size with moderately decreased systolic function.  · The estimated ejection fraction is 35%.  · Atrial fibrillation observed.  · Mild mitral regurgitation.  · Mild tricuspid regurgitation.  · IVC is dilated.IVC collapses >50%.  · Intermediate central venous pressure (8 mmHg).  · The estimated PA systolic pressure is 37 mmHg.  · Normal right ventricular size with normal right ventricular systolic function.  · There is no pulmonary hypertension.       TTE 1.24.20    1. The study quality is below average.  2. The left ventricle is normal in size. Global left ventricular systolic function is mildly decreased. The  left ventricular ejection fraction is 45%.  3. The left atrial diameter is mildly increased. The right atrium is mildly enlarged.  4. Mild (1+) mitral regurgitation. Mild (1+) tricuspid regurgitation.  5. The pulmonary artery systolic pressure is 35 mmHg.     PET 12.6.18   This is a normal perfusion study, no perfusion defects noted.   This scan is suggestive of low risk for future cardiovascular events.   The left ventricular cavity is noted to be moderately enlarged on the stress studies. The stress left ventricular ejection fraction was calculated to be 45% and left ventricular global function is mildly reduced. The rest left ventricular cavity is noted to be mildly enlarged. The rest left ventricular ejection fraction was calculated to be 39% and rest left ventricular global function is moderately reduced.   When compared to the resting ejection fraction (39%), the stress ejection fraction (45%) has increased.   The study quality is good.    Carotid US 2.9.18  1. The study quality is below average.  2. 1-39% stenosis in the proximal right internal carotid artery based on Bluth Criteria.  3. Antegrade right vertebral artery flow.  4. Antegrade left vertebral artery flow    [facility] 1.3.17  EF -20%  Normal coronaries                                                                                                                                                                                                                                                                                                                                                                                                                                              Patient Active Problem List   Diagnosis    Atrial flutter    Hyperlipidemia    Hypertension    Newly diagnosed diabetes    Tobacco user    Body mass index (BMI) of 45.0 to 49.9 in adult    Subclinical hyperthyroidism    Other male erectile dysfunction    Acute systolic  heart failure     Past Surgical History:   Procedure Laterality Date    Cardioversion x 2      Comprehensive electrophysiologic eval. including insertion & repositioning of multiple electrode catheters with induction or attempted induction of arrhythmias with left atrial pacing & recordingfrom coronary sinus or left atrium  07/10/2018    comprehensive electrophysiologic eval. insertion & repositioning multiple electrode catheters with induction or attempted induction of an arrhythmia with right atrial pacing and recording, right ventricular pacing and recording  07/10/2018    Destruction of Conduction Mechanism, Percutaneous Approach  07/10/2018     Social History     Socioeconomic History    Marital status: Single   Occupational History    Occupation: retired   Tobacco Use    Smoking status: Current Some Day Smoker     Types: Cigars    Smokeless tobacco: Never Used    Tobacco comment: Occasionally   Substance and Sexual Activity    Alcohol use: Yes     Comment: Drinks occasionally beer on weekends    Drug use: Never    Sexual activity: Yes     Partners: Female        Family History   Problem Relation Age of Onset    Hypertension Mother     Valvular heart disease Father          Current Outpatient Medications:     amiodarone (PACERONE) 200 MG Tab, Take 1 tablet (200 mg total) by mouth once daily., Disp: 90 tablet, Rfl: 3    furosemide (LASIX) 40 MG tablet, Take 1 tablet (40 mg total) by mouth 2 (two) times daily., Disp: 180 tablet, Rfl: 3    hydrALAZINE (APRESOLINE) 50 MG tablet, Take 1 tablet (50 mg total) by mouth 2 (two) times daily., Disp: 180 tablet, Rfl: 3    KLOR-CON M20 20 mEq tablet, Take 1 tablet (20 mEq total) by mouth once daily., Disp: 90 tablet, Rfl: 3    metFORMIN (GLUCOPHAGE-XR) 500 MG ER 24hr tablet, Take 500 mg by mouth once daily., Disp: , Rfl:     metoprolol succinate (TOPROL-XL) 50 MG 24 hr tablet, Take 1 tablet (50 mg total) by mouth once daily., Disp: 90 tablet, Rfl: 3     pravastatin (PRAVACHOL) 40 MG tablet, Take 1 tablet (40 mg total) by mouth every evening., Disp: 90 tablet, Rfl: 3    XARELTO 20 mg Tab, Take 1 tablet (20 mg total) by mouth once daily., Disp: 90 tablet, Rfl: 1    blood sugar diagnostic Strp, Inject 1 strip into the skin 2 (two) times a day., Disp: 100 strip, Rfl: 11    blood-glucose meter kit,  True Metrix Glucometer, See Instructions, Use to check blood glucose daily, # 1 EA, 0 Refill(s), Pharmacy: [facility], 177.8, cm, Height/Length Dosing, 01/12/22 9:55:00 CST, 148.4, kg, Weight Dosing, 01/12/22 9:55:00 CST, Disp: , Rfl:     EASY TOUCH TWIST LANCETS 33 gauge Misc, USE AS DIRECTED TO CHECK BLOOD GLUCOSE DAILY, Disp: , Rfl:     LANCETS MISC,  True Metrix Lancets, See Instructions, Use to check blood glucose daily, # 100 EA, 11 Refill(s), Pharmacy: [facility], 177.8, cm, Height/Length Dosing, 01/12/22 9:55:00 CST, 148.4, kg, Weight Dosing, 01/12/22 9:55:00 CST, Disp: , Rfl:     sacubitriL-valsartan (ENTRESTO) 24-26 mg per tablet, Take 1 tablet by mouth 2 (two) times daily. (Patient not taking: Reported on 7/12/2022), Disp: 60 tablet, Rfl: 11    sildenafiL (VIAGRA) 25 MG tablet, Take 1 tablet (25 mg total) by mouth daily as needed for Erectile Dysfunction. (Patient not taking: No sig reported), Disp: 15 tablet, Rfl: 1     ROS:                                                                                                                                                                             Review of Systems   Constitutional: Negative.    HENT: Negative.    Eyes: Negative.    Respiratory: Positive for shortness of breath.    Cardiovascular: Positive for leg swelling. Negative for chest pain.   Gastrointestinal: Negative.    Genitourinary: Negative.    Musculoskeletal: Negative.    Skin: Negative.    Neurological: Negative.    Endo/Heme/Allergies: Negative.    Psychiatric/Behavioral: Negative.         Blood  "pressure (!) 122/94, pulse 73, resp. rate 20, height 5' 9.69" (1.77 m), weight (!) 144 kg (317 lb 7.4 oz), SpO2 99 %.   PE:  Physical Exam  Constitutional:       Appearance: Normal appearance.   HENT:      Head: Normocephalic.   Eyes:      Pupils: Pupils are equal, round, and reactive to light.   Cardiovascular:      Rate and Rhythm: Normal rate and regular rhythm.      Pulses: Normal pulses.   Pulmonary:      Effort: Pulmonary effort is normal.   Musculoskeletal:         General: Swelling present. Normal range of motion.   Skin:     General: Skin is warm and dry.   Neurological:      General: No focal deficit present.      Mental Status: He is alert and oriented to person, place, and time.   Psychiatric:         Mood and Affect: Mood normal.         Behavior: Behavior normal.         ASSESSMENT/PLAN:  Abnormal nuclear stress test  - Lexiscan stress test -  moderate to severe intensity, moderate to large sized, reversible perfusion abnormality that is consistent with ischemia in the basal to apical lateral apical wall(s) in the typical distribution of the LCX territory and a small to moderate sized, moderate intensity, reversible perfusion abnormality that is consistent with ischemia in the basal to mid anterior wall(s) in the typical distribution of the LAD territory.  - Reports ongoing exertional dyspnea, may be anginal equivalent.    - [facility]- EF-20%, Normal coronaries (1.3.17)   - Continue Toprol succinate, statin. Unable to add Imdur, patient is on sildenafil  - Schedule coronary angiogram. Patient has ischemic symptoms and new drop in LV function. Has multiple risk factors of HTN, HLD, DM, obesity, tobacco use, and family history of heart disease  - Discussed risks/benefits/alternatives with patient and they are agreeable to proceed  - Strict ED precautions provided     h/o persistent AF/AFL Status post RFA on (7.10.18)   - Denies palpitations  - Has multiple attempted DCCVs but has had recurrence of AFL  - " Continue with amiodarone 200 mg p.o. daily and Toprol succinate 50qd  - Latest CXR for Amio monitoring -stable, TSH normal  - Continue Xarelto 20 mg p.o. daily. Denies any adverse bleeding issues  - Last EKG - Atrial flutter   - Discussed plan of care with staff cardiologist, Dr. Molina. Will continue with amiodarone for now. Will proceed with LHC, drop in EF maybe related to ischemia. If LHC negative, may plan for DCCV    NICMP/HFrEF  - LVEF - 35% per TTE 5.27.22 reduced from 45% per TTE (1.24.20)  - Reports ongoing JOSHI. Denies CP  - Normal perfusion study, no perfusion defects noted (12.6.18)  - LHC- EF-20%, Normal coronaries (1.3.17)  - Euvolemic upon exam warm and dry  - Continue GDMT: Toprol succinate 50 daily. Intolerance to lisinopril secondary to a cough. Increase Losartan to 50 mg BID for optimization of GMDT. Unable to afford Entresto. Will provide patient with assistance program information to obtain Entresto   - Nurse visit in 2 weeks for BP check   - BMP in  2 weeks. Will monitor renal indices closely   - Continue Lasix 40 as directed  - Counseled patient on heart healthy, low-sodium diet and activity as tolerated      HTN  - Continue Toprol. Increasing losartan to 50 mg once daily. Will plan to transition to Entresto if patient is able to obtain    - Counseled on low-sodium diet    Diabetes  - A1C- 6.3  - Continue management per PCP     HLD- LDL -103- improved   - Continue Pravastatin 40 mg po daily., Will plan to transition to high intensity statin pending repeat labs   - Counseled patient on low-cholesterol, low-fat diet, and encourage exercise as tolerated.    Carotid Artery disease  - 1-39% stenosis in the proximal right internal carotid artery based on Bluth Criteria. (2018)  - Denies any dizziness, syncope or focal weakness    Tobacco use  - Reports occasional cigar use. Reports last February 2022  - Counseled on importance of smoking cessation    BMP in two weeks   Nurse visit in two weeks for BP  check   Schedule Coronary angiogram   Follow-up in cardiology clinic post procedure   Follow up with PCP as directed

## 2022-07-11 NOTE — H&P (VIEW-ONLY)
CHIEF COMPLAINT:   Chief Complaint   Patient presents with    Here for test results. States feeling well since last appoi     Denies chest pain, pressure, occasional SOB with activity. Did not start Entresto secondary to cost.                   Review of patient's allergies indicates:  No Known Allergies                                       HPI:  Jamey Duke 61 y.o. AAM with hx HTN, HLD, h/o persistent AF/AFL, DEEP, and h/o NICMP (EF -45% per TTE 1/2020).The patient underwent multiple attempted DCCVs but has had recurrence of AFL. He was a former patient of Dr. Gutierrez and Dr. Syed. He also had a normal perfusion study in December 2018. The patient presents to clinic today for results of testing.  He completed an echocardiogram on 5.27.22 that revealed an ejection fraction of 35% which is reduced from previous EF of 45% per echo in January 2020. He completed a subsequent Lexiscan stress test on 6.30.22 that was abnormal revealing  moderate to severe intensity, moderate to large sized, reversible perfusion abnormality that is consistent with ischemia in the basal to apical lateral apical wall(s) in the typical distribution of the LCX territory and a small to moderate sized, moderate intensity, reversible perfusion abnormality that is consistent with ischemia in the basal to mid anterior wall(s) in the typical distribution of the LAD territory.  Today the patient continues to endorse exertional dyspnea with his regular activity.  He reports that he was working on his trailer yesterday and experienced moderate exertional dyspnea.  He denies any chest pain, orthopnea, PND, claudication symptoms or syncope.  He continues to endorse occasional episodes of peripheral edema that is controlled with diuretic.  Of note, the patient reports that he was unable to start the Entresto that was prescribed at the last clinic visit secondary to cost.    PMH: HTN, HLD, h/o persistent AF/AFL, and h/o NICMP  PSH:  ablation  Social History: Denies any illicit drug, tobacco use-smokes occasional cigar . Reports occasional ETOH use  Family History: Father -leaky valve    Previous Diagnostics:   Lexiscan stress test 6.20.22      Abnormal myocardial perfusion scan.    There are two significant perfusion abnormalities.    Perfusion Abnormality #1 - There is a moderate to severe intensity, moderate to large sized, reversible perfusion abnormality that is consistent with ischemia in the basal to apical lateral apical wall(s) in the typical distribution of the LCX territory.    Perfusion Abnormality #2 - There is a small to moderate sized, moderate intensity, reversible perfusion abnormality that is consistent with ischemia in the basal to mid anterior wall(s) in the typical distribution of the LAD territory.    There are no other significant perfusion abnormalities.    The gated perfusion images showed an ejection fraction of 36% at rest. The gated perfusion images showed an ejection fraction of 33% post stress.    The EKG portion of this study is abnormal but not diagnostic.    The patient reported no chest pain during the stress test.    There were no arrhythmias during stress.     If clinically indicated, consider further evaluation with coronary angiogram.          ECHO 5.27.22    · The left ventricle is normal in size with moderately decreased systolic function.  · The estimated ejection fraction is 35%.  · Atrial fibrillation observed.  · Mild mitral regurgitation.  · Mild tricuspid regurgitation.  · IVC is dilated.IVC collapses >50%.  · Intermediate central venous pressure (8 mmHg).  · The estimated PA systolic pressure is 37 mmHg.  · Normal right ventricular size with normal right ventricular systolic function.  · There is no pulmonary hypertension.       TTE 1.24.20    1. The study quality is below average.  2. The left ventricle is normal in size. Global left ventricular systolic function is mildly decreased. The  left ventricular ejection fraction is 45%.  3. The left atrial diameter is mildly increased. The right atrium is mildly enlarged.  4. Mild (1+) mitral regurgitation. Mild (1+) tricuspid regurgitation.  5. The pulmonary artery systolic pressure is 35 mmHg.     PET 12.6.18   This is a normal perfusion study, no perfusion defects noted.   This scan is suggestive of low risk for future cardiovascular events.   The left ventricular cavity is noted to be moderately enlarged on the stress studies. The stress left ventricular ejection fraction was calculated to be 45% and left ventricular global function is mildly reduced. The rest left ventricular cavity is noted to be mildly enlarged. The rest left ventricular ejection fraction was calculated to be 39% and rest left ventricular global function is moderately reduced.   When compared to the resting ejection fraction (39%), the stress ejection fraction (45%) has increased.   The study quality is good.    Carotid US 2.9.18  1. The study quality is below average.  2. 1-39% stenosis in the proximal right internal carotid artery based on Bluth Criteria.  3. Antegrade right vertebral artery flow.  4. Antegrade left vertebral artery flow    [facility] 1.3.17  EF -20%  Normal coronaries                                                                                                                                                                                                                                                                                                                                                                                                                                              Patient Active Problem List   Diagnosis    Atrial flutter    Hyperlipidemia    Hypertension    Newly diagnosed diabetes    Tobacco user    Body mass index (BMI) of 45.0 to 49.9 in adult    Subclinical hyperthyroidism    Other male erectile dysfunction    Acute systolic  heart failure     Past Surgical History:   Procedure Laterality Date    Cardioversion x 2      Comprehensive electrophysiologic eval. including insertion & repositioning of multiple electrode catheters with induction or attempted induction of arrhythmias with left atrial pacing & recordingfrom coronary sinus or left atrium  07/10/2018    comprehensive electrophysiologic eval. insertion & repositioning multiple electrode catheters with induction or attempted induction of an arrhythmia with right atrial pacing and recording, right ventricular pacing and recording  07/10/2018    Destruction of Conduction Mechanism, Percutaneous Approach  07/10/2018     Social History     Socioeconomic History    Marital status: Single   Occupational History    Occupation: retired   Tobacco Use    Smoking status: Current Some Day Smoker     Types: Cigars    Smokeless tobacco: Never Used    Tobacco comment: Occasionally   Substance and Sexual Activity    Alcohol use: Yes     Comment: Drinks occasionally beer on weekends    Drug use: Never    Sexual activity: Yes     Partners: Female        Family History   Problem Relation Age of Onset    Hypertension Mother     Valvular heart disease Father          Current Outpatient Medications:     amiodarone (PACERONE) 200 MG Tab, Take 1 tablet (200 mg total) by mouth once daily., Disp: 90 tablet, Rfl: 3    furosemide (LASIX) 40 MG tablet, Take 1 tablet (40 mg total) by mouth 2 (two) times daily., Disp: 180 tablet, Rfl: 3    hydrALAZINE (APRESOLINE) 50 MG tablet, Take 1 tablet (50 mg total) by mouth 2 (two) times daily., Disp: 180 tablet, Rfl: 3    KLOR-CON M20 20 mEq tablet, Take 1 tablet (20 mEq total) by mouth once daily., Disp: 90 tablet, Rfl: 3    metFORMIN (GLUCOPHAGE-XR) 500 MG ER 24hr tablet, Take 500 mg by mouth once daily., Disp: , Rfl:     metoprolol succinate (TOPROL-XL) 50 MG 24 hr tablet, Take 1 tablet (50 mg total) by mouth once daily., Disp: 90 tablet, Rfl: 3     pravastatin (PRAVACHOL) 40 MG tablet, Take 1 tablet (40 mg total) by mouth every evening., Disp: 90 tablet, Rfl: 3    XARELTO 20 mg Tab, Take 1 tablet (20 mg total) by mouth once daily., Disp: 90 tablet, Rfl: 1    blood sugar diagnostic Strp, Inject 1 strip into the skin 2 (two) times a day., Disp: 100 strip, Rfl: 11    blood-glucose meter kit,  True Metrix Glucometer, See Instructions, Use to check blood glucose daily, # 1 EA, 0 Refill(s), Pharmacy: [facility], 177.8, cm, Height/Length Dosing, 01/12/22 9:55:00 CST, 148.4, kg, Weight Dosing, 01/12/22 9:55:00 CST, Disp: , Rfl:     EASY TOUCH TWIST LANCETS 33 gauge Misc, USE AS DIRECTED TO CHECK BLOOD GLUCOSE DAILY, Disp: , Rfl:     LANCETS MISC,  True Metrix Lancets, See Instructions, Use to check blood glucose daily, # 100 EA, 11 Refill(s), Pharmacy: [facility], 177.8, cm, Height/Length Dosing, 01/12/22 9:55:00 CST, 148.4, kg, Weight Dosing, 01/12/22 9:55:00 CST, Disp: , Rfl:     sacubitriL-valsartan (ENTRESTO) 24-26 mg per tablet, Take 1 tablet by mouth 2 (two) times daily. (Patient not taking: Reported on 7/12/2022), Disp: 60 tablet, Rfl: 11    sildenafiL (VIAGRA) 25 MG tablet, Take 1 tablet (25 mg total) by mouth daily as needed for Erectile Dysfunction. (Patient not taking: No sig reported), Disp: 15 tablet, Rfl: 1     ROS:                                                                                                                                                                             Review of Systems   Constitutional: Negative.    HENT: Negative.    Eyes: Negative.    Respiratory: Positive for shortness of breath.    Cardiovascular: Positive for leg swelling. Negative for chest pain.   Gastrointestinal: Negative.    Genitourinary: Negative.    Musculoskeletal: Negative.    Skin: Negative.    Neurological: Negative.    Endo/Heme/Allergies: Negative.    Psychiatric/Behavioral: Negative.         Blood  "pressure (!) 122/94, pulse 73, resp. rate 20, height 5' 9.69" (1.77 m), weight (!) 144 kg (317 lb 7.4 oz), SpO2 99 %.   PE:  Physical Exam  Constitutional:       Appearance: Normal appearance.   HENT:      Head: Normocephalic.   Eyes:      Pupils: Pupils are equal, round, and reactive to light.   Cardiovascular:      Rate and Rhythm: Normal rate and regular rhythm.      Pulses: Normal pulses.   Pulmonary:      Effort: Pulmonary effort is normal.   Musculoskeletal:         General: Swelling present. Normal range of motion.   Skin:     General: Skin is warm and dry.   Neurological:      General: No focal deficit present.      Mental Status: He is alert and oriented to person, place, and time.   Psychiatric:         Mood and Affect: Mood normal.         Behavior: Behavior normal.         ASSESSMENT/PLAN:  Abnormal nuclear stress test  - Lexiscan stress test -  moderate to severe intensity, moderate to large sized, reversible perfusion abnormality that is consistent with ischemia in the basal to apical lateral apical wall(s) in the typical distribution of the LCX territory and a small to moderate sized, moderate intensity, reversible perfusion abnormality that is consistent with ischemia in the basal to mid anterior wall(s) in the typical distribution of the LAD territory.  - Reports ongoing exertional dyspnea, may be anginal equivalent.    - [facility]- EF-20%, Normal coronaries (1.3.17)   - Continue Toprol succinate, statin. Unable to add Imdur, patient is on sildenafil  - Schedule coronary angiogram. Patient has ischemic symptoms and new drop in LV function. Has multiple risk factors of HTN, HLD, DM, obesity, tobacco use, and family history of heart disease  - Discussed risks/benefits/alternatives with patient and they are agreeable to proceed  - Strict ED precautions provided     h/o persistent AF/AFL Status post RFA on (7.10.18)   - Denies palpitations  - Has multiple attempted DCCVs but has had recurrence of AFL  - " Continue with amiodarone 200 mg p.o. daily and Toprol succinate 50qd  - Latest CXR for Amio monitoring -stable, TSH normal  - Continue Xarelto 20 mg p.o. daily. Denies any adverse bleeding issues  - Last EKG - Atrial flutter   - Discussed plan of care with staff cardiologist, Dr. Molina. Will continue with amiodarone for now. Will proceed with LHC, drop in EF maybe related to ischemia. If LHC negative, may plan for DCCV    NICMP/HFrEF  - LVEF - 35% per TTE 5.27.22 reduced from 45% per TTE (1.24.20)  - Reports ongoing JOSHI. Denies CP  - Normal perfusion study, no perfusion defects noted (12.6.18)  - LHC- EF-20%, Normal coronaries (1.3.17)  - Euvolemic upon exam warm and dry  - Continue GDMT: Toprol succinate 50 daily. Intolerance to lisinopril secondary to a cough. Increase Losartan to 50 mg BID for optimization of GMDT. Unable to afford Entresto. Will provide patient with assistance program information to obtain Entresto   - Nurse visit in 2 weeks for BP check   - BMP in  2 weeks. Will monitor renal indices closely   - Continue Lasix 40 as directed  - Counseled patient on heart healthy, low-sodium diet and activity as tolerated      HTN  - Continue Toprol. Increasing losartan to 50 mg once daily. Will plan to transition to Entresto if patient is able to obtain    - Counseled on low-sodium diet    Diabetes  - A1C- 6.3  - Continue management per PCP     HLD- LDL -103- improved   - Continue Pravastatin 40 mg po daily., Will plan to transition to high intensity statin pending repeat labs   - Counseled patient on low-cholesterol, low-fat diet, and encourage exercise as tolerated.    Carotid Artery disease  - 1-39% stenosis in the proximal right internal carotid artery based on Bluth Criteria. (2018)  - Denies any dizziness, syncope or focal weakness    Tobacco use  - Reports occasional cigar use. Reports last February 2022  - Counseled on importance of smoking cessation    BMP in two weeks   Nurse visit in two weeks for BP  check   Schedule Coronary angiogram   Follow-up in cardiology clinic post procedure   Follow up with PCP as directed

## 2022-07-12 ENCOUNTER — OFFICE VISIT (OUTPATIENT)
Dept: CARDIOLOGY | Facility: CLINIC | Age: 62
End: 2022-07-12
Payer: COMMERCIAL

## 2022-07-12 VITALS
OXYGEN SATURATION: 99 % | RESPIRATION RATE: 20 BRPM | BODY MASS INDEX: 45.1 KG/M2 | HEIGHT: 70 IN | SYSTOLIC BLOOD PRESSURE: 134 MMHG | WEIGHT: 315 LBS | DIASTOLIC BLOOD PRESSURE: 88 MMHG | HEART RATE: 73 BPM

## 2022-07-12 DIAGNOSIS — I50.22 CHRONIC SYSTOLIC HEART FAILURE: Primary | ICD-10-CM

## 2022-07-12 DIAGNOSIS — R94.39 ABNORMAL NUCLEAR STRESS TEST: ICD-10-CM

## 2022-07-12 DIAGNOSIS — E78.2 MIXED HYPERLIPIDEMIA: ICD-10-CM

## 2022-07-12 DIAGNOSIS — I48.3 TYPICAL ATRIAL FLUTTER: ICD-10-CM

## 2022-07-12 DIAGNOSIS — Z72.0 TOBACCO USER: ICD-10-CM

## 2022-07-12 DIAGNOSIS — I10 PRIMARY HYPERTENSION: ICD-10-CM

## 2022-07-12 DIAGNOSIS — E11.9 DIABETES MELLITUS WITHOUT COMPLICATION: ICD-10-CM

## 2022-07-12 PROCEDURE — 99214 PR OFFICE/OUTPT VISIT, EST, LEVL IV, 30-39 MIN: ICD-10-PCS | Mod: S$PBB,,, | Performed by: NURSE PRACTITIONER

## 2022-07-12 PROCEDURE — 4010F PR ACE/ARB THEARPY RXD/TAKEN: ICD-10-PCS | Mod: CPTII,,, | Performed by: NURSE PRACTITIONER

## 2022-07-12 PROCEDURE — 3008F PR BODY MASS INDEX (BMI) DOCUMENTED: ICD-10-PCS | Mod: CPTII,,, | Performed by: NURSE PRACTITIONER

## 2022-07-12 PROCEDURE — 4010F ACE/ARB THERAPY RXD/TAKEN: CPT | Mod: CPTII,,, | Performed by: NURSE PRACTITIONER

## 2022-07-12 PROCEDURE — 3079F PR MOST RECENT DIASTOLIC BLOOD PRESSURE 80-89 MM HG: ICD-10-PCS | Mod: CPTII,,, | Performed by: NURSE PRACTITIONER

## 2022-07-12 PROCEDURE — 99214 OFFICE O/P EST MOD 30 MIN: CPT | Mod: S$PBB,,, | Performed by: NURSE PRACTITIONER

## 2022-07-12 PROCEDURE — 93005 ELECTROCARDIOGRAM TRACING: CPT

## 2022-07-12 PROCEDURE — 3075F PR MOST RECENT SYSTOLIC BLOOD PRESS GE 130-139MM HG: ICD-10-PCS | Mod: CPTII,,, | Performed by: NURSE PRACTITIONER

## 2022-07-12 PROCEDURE — 3075F SYST BP GE 130 - 139MM HG: CPT | Mod: CPTII,,, | Performed by: NURSE PRACTITIONER

## 2022-07-12 PROCEDURE — 3008F BODY MASS INDEX DOCD: CPT | Mod: CPTII,,, | Performed by: NURSE PRACTITIONER

## 2022-07-12 PROCEDURE — 3079F DIAST BP 80-89 MM HG: CPT | Mod: CPTII,,, | Performed by: NURSE PRACTITIONER

## 2022-07-12 PROCEDURE — 99215 OFFICE O/P EST HI 40 MIN: CPT | Mod: PBBFAC | Performed by: NURSE PRACTITIONER

## 2022-07-12 PROCEDURE — 1159F MED LIST DOCD IN RCRD: CPT | Mod: CPTII,,, | Performed by: NURSE PRACTITIONER

## 2022-07-12 PROCEDURE — 1159F PR MEDICATION LIST DOCUMENTED IN MEDICAL RECORD: ICD-10-PCS | Mod: CPTII,,, | Performed by: NURSE PRACTITIONER

## 2022-07-12 RX ORDER — DIAZEPAM 2 MG/1
5 TABLET ORAL
Status: DISCONTINUED | OUTPATIENT
Start: 2022-07-12 | End: 2022-07-29 | Stop reason: HOSPADM

## 2022-07-12 RX ORDER — LOSARTAN POTASSIUM 50 MG/1
50 TABLET ORAL DAILY
Qty: 90 TABLET | Refills: 3 | Status: SHIPPED | OUTPATIENT
Start: 2022-07-12 | End: 2022-09-19 | Stop reason: ALTCHOICE

## 2022-07-12 RX ORDER — LOSARTAN POTASSIUM 50 MG/1
50 TABLET ORAL DAILY
Qty: 90 TABLET | Refills: 3 | Status: SHIPPED | OUTPATIENT
Start: 2022-07-12 | End: 2022-07-12 | Stop reason: SDUPTHER

## 2022-07-12 RX ORDER — SODIUM CHLORIDE 9 MG/ML
INJECTION, SOLUTION INTRAVENOUS ONCE
Status: CANCELLED | OUTPATIENT
Start: 2022-07-12 | End: 2022-07-12

## 2022-07-12 RX ORDER — DIPHENHYDRAMINE HCL 25 MG
25 CAPSULE ORAL
Status: CANCELLED | OUTPATIENT
Start: 2022-07-12

## 2022-07-12 RX ORDER — METFORMIN HYDROCHLORIDE 500 MG/1
500 TABLET, EXTENDED RELEASE ORAL DAILY
COMMUNITY
Start: 2022-05-16 | End: 2022-08-08

## 2022-07-12 NOTE — PATIENT INSTRUCTIONS
BMP in two weeks   Nurse visit in two weeks for BP check   Schedule Coronary angiogram   Follow-up in cardiology clinic post procedure   Follow up with PCP as directed

## 2022-07-14 ENCOUNTER — TELEPHONE (OUTPATIENT)
Dept: CARDIOLOGY | Facility: CLINIC | Age: 62
End: 2022-07-14
Payer: COMMERCIAL

## 2022-07-14 NOTE — TELEPHONE ENCOUNTER
Spoke with patient informing him that his Entresto was approved. Once he receives the Entresto informed him to stop taking Losartan. Pt verbalized and acknowledged understanding.

## 2022-07-16 ENCOUNTER — TELEPHONE (OUTPATIENT)
Dept: INTERNAL MEDICINE | Facility: CLINIC | Age: 62
End: 2022-07-16
Payer: COMMERCIAL

## 2022-07-16 DIAGNOSIS — E11.9 TYPE 2 DIABETES MELLITUS WITHOUT COMPLICATION, WITHOUT LONG-TERM CURRENT USE OF INSULIN: Primary | ICD-10-CM

## 2022-07-18 NOTE — PROGRESS NOTES
Jamey Duke is a 61 y.o. male here for a diabetic eye screening with non-dilated fundus photos per GIOVANA Pickens NP.    Patient cooperative?: Yes  Small pupils?: No  Last eye exam: unknown    For exam results, see Encounter Report.

## 2022-07-26 ENCOUNTER — LAB VISIT (OUTPATIENT)
Dept: LAB | Facility: HOSPITAL | Age: 62
End: 2022-07-26
Attending: NURSE PRACTITIONER
Payer: COMMERCIAL

## 2022-07-26 DIAGNOSIS — E11.9 DIABETES MELLITUS WITHOUT COMPLICATION: ICD-10-CM

## 2022-07-26 DIAGNOSIS — R94.39 ABNORMAL NUCLEAR STRESS TEST: ICD-10-CM

## 2022-07-26 DIAGNOSIS — I10 PRIMARY HYPERTENSION: ICD-10-CM

## 2022-07-26 DIAGNOSIS — I50.22 CHRONIC SYSTOLIC HEART FAILURE: ICD-10-CM

## 2022-07-26 DIAGNOSIS — Z72.0 TOBACCO USER: ICD-10-CM

## 2022-07-26 DIAGNOSIS — E78.2 MIXED HYPERLIPIDEMIA: ICD-10-CM

## 2022-07-26 LAB
ANION GAP SERPL CALC-SCNC: 11 MEQ/L
APTT PPP: 37.4 SECONDS
BASOPHILS # BLD AUTO: 0.06 X10(3)/MCL (ref 0–0.2)
BASOPHILS NFR BLD AUTO: 0.5 %
BUN SERPL-MCNC: 16.4 MG/DL (ref 8.4–25.7)
CALCIUM SERPL-MCNC: 9.7 MG/DL (ref 8.8–10)
CHLORIDE SERPL-SCNC: 102 MMOL/L (ref 98–107)
CO2 SERPL-SCNC: 27 MMOL/L (ref 23–31)
CREAT SERPL-MCNC: 1.2 MG/DL (ref 0.73–1.18)
CREAT/UREA NIT SERPL: 14
EOSINOPHIL # BLD AUTO: 0.21 X10(3)/MCL (ref 0–0.9)
EOSINOPHIL NFR BLD AUTO: 1.8 %
ERYTHROCYTE [DISTWIDTH] IN BLOOD BY AUTOMATED COUNT: 15.3 % (ref 11.5–17)
GLUCOSE SERPL-MCNC: 125 MG/DL (ref 82–115)
HCT VFR BLD AUTO: 42.4 % (ref 42–52)
HGB BLD-MCNC: 13.7 GM/DL (ref 14–18)
IMM GRANULOCYTES # BLD AUTO: 0.03 X10(3)/MCL (ref 0–0.04)
IMM GRANULOCYTES NFR BLD AUTO: 0.3 %
LYMPHOCYTES # BLD AUTO: 2.43 X10(3)/MCL (ref 0.6–4.6)
LYMPHOCYTES NFR BLD AUTO: 21.2 %
MCH RBC QN AUTO: 28.3 PG (ref 27–31)
MCHC RBC AUTO-ENTMCNC: 32.3 MG/DL (ref 33–36)
MCV RBC AUTO: 87.6 FL (ref 80–94)
MONOCYTES # BLD AUTO: 1.06 X10(3)/MCL (ref 0.1–1.3)
MONOCYTES NFR BLD AUTO: 9.2 %
NEUTROPHILS # BLD AUTO: 7.7 X10(3)/MCL (ref 2.1–9.2)
NEUTROPHILS NFR BLD AUTO: 67 %
NRBC BLD AUTO-RTO: 0 %
PLATELET # BLD AUTO: 228 X10(3)/MCL (ref 130–400)
PMV BLD AUTO: 12.5 FL (ref 7.4–10.4)
POTASSIUM SERPL-SCNC: 3.9 MMOL/L (ref 3.5–5.1)
RBC # BLD AUTO: 4.84 X10(6)/MCL (ref 4.7–6.1)
SODIUM SERPL-SCNC: 140 MMOL/L (ref 136–145)
WBC # SPEC AUTO: 11.5 X10(3)/MCL (ref 4.5–11.5)

## 2022-07-26 PROCEDURE — 85025 COMPLETE CBC W/AUTO DIFF WBC: CPT

## 2022-07-26 PROCEDURE — 36415 COLL VENOUS BLD VENIPUNCTURE: CPT

## 2022-07-26 PROCEDURE — 80048 BASIC METABOLIC PNL TOTAL CA: CPT

## 2022-07-26 PROCEDURE — 85730 THROMBOPLASTIN TIME PARTIAL: CPT

## 2022-07-26 PROCEDURE — 85610 PROTHROMBIN TIME: CPT

## 2022-07-29 ENCOUNTER — HOSPITAL ENCOUNTER (OUTPATIENT)
Facility: HOSPITAL | Age: 62
Discharge: HOME | End: 2022-07-29
Attending: INTERNAL MEDICINE | Admitting: INTERNAL MEDICINE
Payer: COMMERCIAL

## 2022-07-29 VITALS
BODY MASS INDEX: 45.1 KG/M2 | DIASTOLIC BLOOD PRESSURE: 99 MMHG | WEIGHT: 315 LBS | SYSTOLIC BLOOD PRESSURE: 131 MMHG | HEART RATE: 74 BPM | OXYGEN SATURATION: 95 % | RESPIRATION RATE: 16 BRPM | HEIGHT: 70 IN

## 2022-07-29 DIAGNOSIS — E78.2 MIXED HYPERLIPIDEMIA: ICD-10-CM

## 2022-07-29 DIAGNOSIS — R94.39 ABNORMAL NUCLEAR STRESS TEST: ICD-10-CM

## 2022-07-29 DIAGNOSIS — I50.22 CHRONIC SYSTOLIC HEART FAILURE: ICD-10-CM

## 2022-07-29 DIAGNOSIS — I50.21 ACUTE SYSTOLIC HEART FAILURE: Primary | ICD-10-CM

## 2022-07-29 DIAGNOSIS — E11.9 DIABETES MELLITUS WITHOUT COMPLICATION: ICD-10-CM

## 2022-07-29 DIAGNOSIS — I10 PRIMARY HYPERTENSION: ICD-10-CM

## 2022-07-29 DIAGNOSIS — Z72.0 TOBACCO USER: ICD-10-CM

## 2022-07-29 LAB — POCT GLUCOSE: 109 MG/DL (ref 70–110)

## 2022-07-29 PROCEDURE — C1894 INTRO/SHEATH, NON-LASER: HCPCS | Performed by: INTERNAL MEDICINE

## 2022-07-29 PROCEDURE — 99152 MOD SED SAME PHYS/QHP 5/>YRS: CPT | Performed by: INTERNAL MEDICINE

## 2022-07-29 PROCEDURE — 25000003 PHARM REV CODE 250

## 2022-07-29 PROCEDURE — C1887 CATHETER, GUIDING: HCPCS | Performed by: INTERNAL MEDICINE

## 2022-07-29 PROCEDURE — 25000003 PHARM REV CODE 250: Performed by: NURSE PRACTITIONER

## 2022-07-29 PROCEDURE — C1769 GUIDE WIRE: HCPCS | Performed by: INTERNAL MEDICINE

## 2022-07-29 PROCEDURE — 93458 L HRT ARTERY/VENTRICLE ANGIO: CPT | Performed by: INTERNAL MEDICINE

## 2022-07-29 PROCEDURE — 63600175 PHARM REV CODE 636 W HCPCS: Performed by: INTERNAL MEDICINE

## 2022-07-29 PROCEDURE — 25000003 PHARM REV CODE 250: Performed by: INTERNAL MEDICINE

## 2022-07-29 PROCEDURE — 25500020 PHARM REV CODE 255: Performed by: INTERNAL MEDICINE

## 2022-07-29 RX ORDER — CLONIDINE HYDROCHLORIDE 0.1 MG/1
0.1 TABLET ORAL ONCE
Status: COMPLETED | OUTPATIENT
Start: 2022-07-29 | End: 2022-07-29

## 2022-07-29 RX ORDER — LIDOCAINE HYDROCHLORIDE 20 MG/ML
INJECTION, SOLUTION INFILTRATION; PERINEURAL
Status: DISCONTINUED | OUTPATIENT
Start: 2022-07-29 | End: 2022-07-29 | Stop reason: HOSPADM

## 2022-07-29 RX ORDER — DIAZEPAM 5 MG/1
5 TABLET ORAL
Status: DISCONTINUED | OUTPATIENT
Start: 2022-07-29 | End: 2022-07-29 | Stop reason: HOSPADM

## 2022-07-29 RX ORDER — SODIUM CHLORIDE 9 MG/ML
INJECTION, SOLUTION INTRAVENOUS ONCE
Status: COMPLETED | OUTPATIENT
Start: 2022-07-29 | End: 2022-07-29

## 2022-07-29 RX ORDER — ONDANSETRON 8 MG/1
8 TABLET, ORALLY DISINTEGRATING ORAL EVERY 8 HOURS PRN
Status: DISCONTINUED | OUTPATIENT
Start: 2022-07-29 | End: 2022-07-29 | Stop reason: HOSPADM

## 2022-07-29 RX ORDER — ACETAMINOPHEN 325 MG/1
650 TABLET ORAL EVERY 4 HOURS PRN
Status: DISCONTINUED | OUTPATIENT
Start: 2022-07-29 | End: 2022-07-29 | Stop reason: HOSPADM

## 2022-07-29 RX ORDER — DIPHENHYDRAMINE HCL 25 MG
25 CAPSULE ORAL
Status: DISCONTINUED | OUTPATIENT
Start: 2022-07-29 | End: 2022-07-29 | Stop reason: HOSPADM

## 2022-07-29 RX ORDER — MIDAZOLAM HYDROCHLORIDE 1 MG/ML
INJECTION, SOLUTION INTRAMUSCULAR; INTRAVENOUS
Status: DISCONTINUED | OUTPATIENT
Start: 2022-07-29 | End: 2022-07-29 | Stop reason: HOSPADM

## 2022-07-29 RX ADMIN — CLONIDINE HYDROCHLORIDE 0.1 MG: 0.1 TABLET ORAL at 10:07

## 2022-07-29 RX ADMIN — DIPHENHYDRAMINE HYDROCHLORIDE 25 MG: 25 CAPSULE ORAL at 08:07

## 2022-07-29 RX ADMIN — SODIUM CHLORIDE: 9 INJECTION, SOLUTION INTRAVENOUS at 07:07

## 2022-07-29 RX ADMIN — DIAZEPAM 5 MG: 5 TABLET ORAL at 08:07

## 2022-07-29 NOTE — BRIEF OP NOTE
INTERVENTIONAL CARDIOLOGY SERVICE  [facility] [place]  CARDIAC CATHETERIZATION REPORT           Attending Cardiologist:     Attending Cardiologist: Dr. Zuniga  General Fellow: Dr. Truong      Consent, preparation, timeout, general procedures:   · Informed Consent. The risks and alternatives of the procedures and conscious sedation were explained and informed consent was obtained.   · The patient was brought to the cath lab and placed on the table. The planned puncture sites were prepped and draped in the usual sterile fashion.  · Time-out. A safety time-out was performed prior to initiation of the procedure.   · Moderate sedation was used with fentanyl and midazolam; please see RN record for doses.  · 1% lidocaine was infiltrated for local anesthesia at each access site.      Access, diagnostic and adjunctive procedures description:       ACCESS    US guided R radial access    Hemostasis:     . Radial compression band      SUMMARY:   Procedures performed:  · C/Coronary Angiography       IMPRESSIONS:     · Non-ischemic cardiomyopathy  · No evidence of angiographically significant coronary artery disease.  · LVEDP 22mmHg  · Atrial fibrillation         RECOMMENDATIONS:     · Evidence-based, guideline-oriented, patient-centered therapies of cardiomyopathy  · Recommend aggressive control of blood pressure and address persistent atrial fibrillation  · Referral to a cardiac rehabilitation program.  · Follow-up at the [facility] Cardiology clinic  · Per protocol post-cardiac catheterization care and access site management.         Poppy Truong  PGY 5  [employer] [facility]  Attending Addendum to follow

## 2022-07-29 NOTE — Clinical Note
The radial band was applied to the right radial artery. 11 cc's of air were inserted into the closure device. Able to move right fingers without difficulty or complaints; fingers warm to touch; cap refill less than 3 seconds

## 2022-07-29 NOTE — Clinical Note
39 ml of contrast were injected throughout the case. 61 mL of contrast was the total wasted during the case. 100 mL was the total amount used during the case.

## 2022-07-29 NOTE — DISCHARGE SUMMARY
[facility] - Cath Lab  Discharge Note  Short Stay    Procedure(s) (LRB):  Angiogram, Coronary, with Left Heart Cath (N/A)    OUTCOME: Patient tolerated treatment/procedure well without complication and is now ready for discharge.    DISPOSITION: Home or Self Care    FINAL DIAGNOSIS:  Non-ischemic cardiomyopathy    FOLLOWUP: In clinic    DISCHARGE INSTRUCTIONS:    Avoid lifting anything > 3lbs from R hand for 1 week  Avoid any heavy/strenous activity involving R hand for 1 week  OK to remove dressing in 24 hours and replace with bandaid  Continue all medications as prescribed  Follow up in clinic    Discharge Procedure Orders   Diet Cardiac        Poppy Truong  PGY 5  [employer] [facility]  Attending Addendum to follow

## 2022-07-29 NOTE — DISCHARGE INSTRUCTIONS
DO NOT LIFT/PUSH/PULL ANYTHING OVER 5LBS FOR 5 DAYS WITH RIGHT ARM    DO NOT PLACE RIGHT WRIST IN ANY STANDING, DIRTY WATER FOR 5 DAYS    NO DRIVING FOR 24 HOURS    DO NOT MOVE RIGHT WRIST FOR 24 HOURS    TAKE IT EASY FOR 5 DAYS, AVOID WORKING OUTSIDE    REMOVE BLUE ARM BOARD ON Saturday    REMOVE GAUZE DRESSING ON SUNDAY - WASH WRIST WITH SOAP AND WATER IN SHOWER, PAT DRY, LEAVE OPEN TO AIR    DRINK PLENTY OF WATER OVER THE NEXT FEW DAYS

## 2022-07-29 NOTE — INTERVAL H&P NOTE
The patient has been examined and the H&P has been reviewed:    There have been no changes to the patient's history, physical, or plan since the patient was last seen this 7/12/22 with regards to HFrEF/NICMP and AF/AFL with new decrease in EF to 35% from 45%. The patient is scheduled for [facility]/Coronary Angiogram +/- PCI +/- aortic pressures +/- ventriculography today. All risks and benefits regarding the procedure have been discussed with the patient in detail including but not limited to bleeding, infection, renal failure, worsening condition, myocardial infarction, stroke, and failure of intent of procedure. The patient has expressed understanding of all risks and benefits for the procedure. All questions and concerns have been answered. Informed consent forms have been explained to the patient and all consents have been signed. Please see H&P/note from 7/12/22 for further details.      Poppy Truong  PGY 5  [employer] [facility]  Attending Addendum to follow

## 2022-07-29 NOTE — PATIENT INSTRUCTIONS
Continue all medications as prescribed  Do not lift anything over 3lbs from your right hand for 1 week and avoid any strenuous activity involving the right hand  Please notify if any swelling, numbness, tingling, pain or redness at the site  We will follow you back in the clinic  You may resume your Xarelto tomorrow

## 2022-08-11 ENCOUNTER — TELEPHONE (OUTPATIENT)
Dept: INTERNAL MEDICINE | Facility: CLINIC | Age: 62
End: 2022-08-11

## 2022-08-11 NOTE — TELEPHONE ENCOUNTER
Pt would like to reschedule his appt that was missed on today because he didn't have a ride to get here to see ALMA Pickens.

## 2022-08-19 ENCOUNTER — TELEPHONE (OUTPATIENT)
Dept: CARDIOLOGY | Facility: CLINIC | Age: 62
End: 2022-08-19
Payer: COMMERCIAL

## 2022-08-19 NOTE — TELEPHONE ENCOUNTER
Patient obtained the Xarelto at [facility] pharmacy 3 month supply for 34.42. He is ok with that price.     ----- Message from Shira Talley MA sent at 8/18/2022  8:21 AM CDT -----  Regarding: MED  PT CALLED STATING HIS XERALTO IS TO EXPENSIVE. HE WANT TO KNOW CAN SOMETHING BE DONE WITH THAT OR CAN SWITCH MEDS. THANKS

## 2022-09-19 ENCOUNTER — OFFICE VISIT (OUTPATIENT)
Dept: INTERNAL MEDICINE | Facility: CLINIC | Age: 62
End: 2022-09-19
Payer: COMMERCIAL

## 2022-09-19 VITALS
RESPIRATION RATE: 18 BRPM | TEMPERATURE: 98 F | WEIGHT: 315 LBS | BODY MASS INDEX: 45.1 KG/M2 | SYSTOLIC BLOOD PRESSURE: 128 MMHG | HEIGHT: 70 IN | DIASTOLIC BLOOD PRESSURE: 88 MMHG | HEART RATE: 77 BPM

## 2022-09-19 DIAGNOSIS — E78.2 MIXED HYPERLIPIDEMIA: ICD-10-CM

## 2022-09-19 DIAGNOSIS — I48.3 TYPICAL ATRIAL FLUTTER: ICD-10-CM

## 2022-09-19 DIAGNOSIS — E11.9 TYPE 2 DIABETES MELLITUS WITHOUT COMPLICATION, WITHOUT LONG-TERM CURRENT USE OF INSULIN: ICD-10-CM

## 2022-09-19 DIAGNOSIS — I10 PRIMARY HYPERTENSION: Primary | ICD-10-CM

## 2022-09-19 DIAGNOSIS — Z72.0 TOBACCO USER: ICD-10-CM

## 2022-09-19 DIAGNOSIS — H35.30 AMD (AGE-RELATED MACULAR DEGENERATION), BILATERAL: ICD-10-CM

## 2022-09-19 DIAGNOSIS — I50.21 ACUTE SYSTOLIC HEART FAILURE: ICD-10-CM

## 2022-09-19 LAB — HBA1C MFR BLD: 6.4 %

## 2022-09-19 PROCEDURE — 4010F ACE/ARB THERAPY RXD/TAKEN: CPT | Mod: CPTII,,, | Performed by: NURSE PRACTITIONER

## 2022-09-19 PROCEDURE — 4010F PR ACE/ARB THEARPY RXD/TAKEN: ICD-10-PCS | Mod: CPTII,,, | Performed by: NURSE PRACTITIONER

## 2022-09-19 PROCEDURE — 3008F PR BODY MASS INDEX (BMI) DOCUMENTED: ICD-10-PCS | Mod: CPTII,,, | Performed by: NURSE PRACTITIONER

## 2022-09-19 PROCEDURE — 3079F PR MOST RECENT DIASTOLIC BLOOD PRESSURE 80-89 MM HG: ICD-10-PCS | Mod: CPTII,,, | Performed by: NURSE PRACTITIONER

## 2022-09-19 PROCEDURE — 83036 HEMOGLOBIN GLYCOSYLATED A1C: CPT | Mod: PBBFAC | Performed by: NURSE PRACTITIONER

## 2022-09-19 PROCEDURE — 3044F HG A1C LEVEL LT 7.0%: CPT | Mod: CPTII,,, | Performed by: NURSE PRACTITIONER

## 2022-09-19 PROCEDURE — 1159F MED LIST DOCD IN RCRD: CPT | Mod: CPTII,,, | Performed by: NURSE PRACTITIONER

## 2022-09-19 PROCEDURE — 1159F PR MEDICATION LIST DOCUMENTED IN MEDICAL RECORD: ICD-10-PCS | Mod: CPTII,,, | Performed by: NURSE PRACTITIONER

## 2022-09-19 PROCEDURE — 3079F DIAST BP 80-89 MM HG: CPT | Mod: CPTII,,, | Performed by: NURSE PRACTITIONER

## 2022-09-19 PROCEDURE — 99215 OFFICE O/P EST HI 40 MIN: CPT | Mod: PBBFAC | Performed by: NURSE PRACTITIONER

## 2022-09-19 PROCEDURE — 3074F SYST BP LT 130 MM HG: CPT | Mod: CPTII,,, | Performed by: NURSE PRACTITIONER

## 2022-09-19 PROCEDURE — 3008F BODY MASS INDEX DOCD: CPT | Mod: CPTII,,, | Performed by: NURSE PRACTITIONER

## 2022-09-19 PROCEDURE — 99214 OFFICE O/P EST MOD 30 MIN: CPT | Mod: S$PBB,,, | Performed by: NURSE PRACTITIONER

## 2022-09-19 PROCEDURE — 99214 PR OFFICE/OUTPT VISIT, EST, LEVL IV, 30-39 MIN: ICD-10-PCS | Mod: S$PBB,,, | Performed by: NURSE PRACTITIONER

## 2022-09-19 PROCEDURE — 3044F PR MOST RECENT HEMOGLOBIN A1C LEVEL <7.0%: ICD-10-PCS | Mod: CPTII,,, | Performed by: NURSE PRACTITIONER

## 2022-09-19 PROCEDURE — 3074F PR MOST RECENT SYSTOLIC BLOOD PRESSURE < 130 MM HG: ICD-10-PCS | Mod: CPTII,,, | Performed by: NURSE PRACTITIONER

## 2022-09-19 RX ORDER — METFORMIN HYDROCHLORIDE 500 MG/1
500 TABLET, EXTENDED RELEASE ORAL DAILY
Qty: 90 TABLET | Refills: 1 | Status: SHIPPED | OUTPATIENT
Start: 2022-09-19 | End: 2023-01-26 | Stop reason: SDUPTHER

## 2022-09-19 NOTE — PROGRESS NOTES
MARIBELL Irizarry   [facility]  [address] [facility] [facility] [address]      PATIENT NAME: Jamey Duke  : 1960  DATE: 22  MRN: [id number]      Billing Provider: MARIBELL Irizarry  Level of Service: [place] OFFICE/OUTPT VISIT, EST, LEVL IV, 30-39 MIN  Patient PCP Information       Provider PCP Type    MARIBELL Iirzarry General            Reason for Visit / Chief Complaint: Follow-up (Fasting, needs refills, refused vaccines)       History of Present Illness / Problem Focused Workflow     Jamey Duke is a 61 y.o. Black or  male presents to the clinic for DM f/u. PMH DM, HTN, HLD, h/o persistent AF/AFL, DEEP, and h/o NICMP w/multiple DCCVs -AFL recurred, and ED. Followed by [facility] cardio clinic. Pt underwent LHC which revealed nonischemic cardiomyopathy after echo revealed decrease in EF to 35%. Pt has transitioned to entresto since obtained through PAP. Reports SOB is somewhat improving; BLE edema has resolved. Reports med compliance. Did not complete labs prior to visit as missed visit and they were . Attempts to follow ADA/dash diet. CBGs range  when checked every morning. Fundus photo revealed macular degeneration; discussed with pt. Has stopped smoking since [facility] in July. Denies chest pain, shortness of breath, cough, fever, headache, dizziness, weakness, abdominal pain, nausea, vomiting, diarrhea, constipation, dysuria, depression, anxiety, SI/HI.      Review of Systems     Review of Systems   Constitutional: Negative.    HENT: Negative.     Eyes: Negative.    Respiratory: Negative.     Cardiovascular: Negative.    Gastrointestinal: Negative.    Endocrine: Negative.    Genitourinary: Negative.    Musculoskeletal: Negative.    Integumentary:  Negative.   Neurological: Negative.    Psychiatric/Behavioral: Negative.        Medical / Social / Family History     Past Medical History:   Diagnosis Date     Atrial flutter     Diabetes mellitus     Hypercholesteremia     Hypertension     Morbid obesity     Obese     Tobacco user        Past Surgical History:   Procedure Laterality Date    ANGIOGRAM, CORONARY, WITH LEFT HEART CATHETERIZATION N/A 7/29/2022    Procedure: Angiogram, Coronary, with Left Heart Cath;  Surgeon: Yossi Zuniga MD;  Location: [facility] CATH LAB;  Service: Cardiology;  Laterality: N/A;    Cardioversion x 2      Comprehensive electrophysiologic eval. including insertion & repositioning of multiple electrode catheters with induction or attempted induction of arrhythmias with left atrial pacing & recordingfrom coronary sinus or left atrium  07/10/2018    comprehensive electrophysiologic eval. insertion & repositioning multiple electrode catheters with induction or attempted induction of an arrhythmia with right atrial pacing and recording, right ventricular pacing and recording  07/10/2018    Destruction of Conduction Mechanism, Percutaneous Approach  07/10/2018       Social History  Mr. Duke reports that he has been smoking cigars. He has never used smokeless tobacco. He reports current alcohol use. He reports that he does not use drugs.    Family History  's family history includes Hypertension in his mother; Valvular heart disease in his father.    Medications and Allergies     Medications  Medication List with Changes/Refills   Current Medications    AMIODARONE (PACERONE) 200 MG TAB    Take 1 tablet (200 mg total) by mouth once daily.    BLOOD SUGAR DIAGNOSTIC STRP    Inject 1 strip into the skin 2 (two) times a day.    BLOOD-GLUCOSE METER KIT      True Metrix Glucometer, See Instructions, Use to check blood glucose daily, # 1 EA, 0 Refill(s), Pharmacy: [facility], 177.8, cm, Height/Length Dosing, 01/12/22 9:55:00 CST, 148.4, kg, Weight Dosing, 01/12/22 9:55:00 CST    EASY TOUCH TWIST LANCETS 33 GAUGE MISC    USE AS DIRECTED TO CHECK BLOOD GLUCOSE DAILY     "FUROSEMIDE (LASIX) 40 MG TABLET    Take 1 tablet (40 mg total) by mouth 2 (two) times daily.    HYDRALAZINE (APRESOLINE) 50 MG TABLET    Take 1 tablet (50 mg total) by mouth 2 (two) times daily.    KLOR-CON M20 20 MEQ TABLET    Take 1 tablet (20 mEq total) by mouth once daily.    LANCETS MISC      True Metrix Lancets, See Instructions, Use to check blood glucose daily, # 100 EA, 11 Refill(s), Pharmacy: [facility], 177.8, cm, Height/Length Dosing, 01/12/22 9:55:00 CST, 148.4, kg, Weight Dosing, 01/12/22 9:55:00 CST    METOPROLOL SUCCINATE (TOPROL-XL) 50 MG 24 HR TABLET    Take 1 tablet (50 mg total) by mouth once daily.    PRAVASTATIN (PRAVACHOL) 40 MG TABLET    Take 1 tablet (40 mg total) by mouth every evening.    SACUBITRIL-VALSARTAN (ENTRESTO) 24-26 MG PER TABLET    Take 1 tablet by mouth 2 (two) times daily.    XARELTO 20 MG TAB    Take 1 tablet (20 mg total) by mouth once daily.   Changed and/or Refilled Medications    Modified Medication Previous Medication    METFORMIN (GLUCOPHAGE-XR) 500 MG ER 24HR TABLET metFORMIN (GLUCOPHAGE-XR) 500 MG ER 24hr tablet       Take 1 tablet (500 mg total) by mouth once daily.    TAKE 1 TABLET BY MOUTH DAILY   Discontinued Medications    LOSARTAN (COZAAR) 50 MG TABLET    Take 1 tablet (50 mg total) by mouth once daily.    SILDENAFIL (VIAGRA) 25 MG TABLET    Take 1 tablet (25 mg total) by mouth daily as needed for Erectile Dysfunction.         Allergies  Review of patient's allergies indicates:  No Known Allergies    Physical Examination   Vital Signs  Temp: 98.2 °F (36.8 °C)  Temp src: Oral  Pulse: 77  Resp: 18  BP: 128/88  BP Location: Left arm  Patient Position: Sitting  Pain Score: 0-No pain  Height and Weight  Height: 5' 10.39" (178.8 cm)  Weight: (!) 146.1 kg (322 lb)  BSA (Calculated - sq m): 2.69 sq meters  BMI (Calculated): 45.7  Weight in (lb) to have BMI = 25: 175.8]    Physical Exam  Vitals reviewed.   Constitutional:       Appearance: Normal " appearance. He is morbidly obese.   HENT:      Head: Normocephalic.   Eyes:      Pupils: Pupils are equal, round, and reactive to light.   Cardiovascular:      Rate and Rhythm: Normal rate and regular rhythm.      Pulses: Normal pulses.      Heart sounds: Normal heart sounds.   Pulmonary:      Effort: Pulmonary effort is normal.      Breath sounds: Normal breath sounds.   Abdominal:      General: Bowel sounds are normal.      Palpations: Abdomen is soft.   Musculoskeletal:         General: Normal range of motion.   Skin:     General: Skin is warm.   Neurological:      Mental Status: He is alert and oriented to person, place, and time.   Psychiatric:         Mood and Affect: Mood normal.         Results     Lab Results   Component Value Date    WBC 11.5 07/26/2022    RBC 4.84 07/26/2022    HGB 13.7 (L) 07/26/2022    HCT 42.4 07/26/2022    MCV 87.6 07/26/2022    MCH 28.3 07/26/2022    MCHC 32.3 (L) 07/26/2022    RDW 15.3 07/26/2022     07/26/2022    MPV 12.5 (H) 07/26/2022     Sodium Level   Date Value Ref Range Status   07/26/2022 140 136 - 145 mmol/L Final     Potassium Level   Date Value Ref Range Status   07/26/2022 3.9 3.5 - 5.1 mmol/L Final     Carbon Dioxide   Date Value Ref Range Status   07/26/2022 27 23 - 31 mmol/L Final     Blood Urea Nitrogen   Date Value Ref Range Status   07/26/2022 16.4 8.4 - 25.7 mg/dL Final     Creatinine   Date Value Ref Range Status   07/26/2022 1.20 (H) 0.73 - 1.18 mg/dL Final     Calcium Level Total   Date Value Ref Range Status   07/26/2022 9.7 8.8 - 10.0 mg/dL Final     Albumin Level   Date Value Ref Range Status   05/12/2022 3.8 3.4 - 4.8 gm/dL Final     Bilirubin Total   Date Value Ref Range Status   05/12/2022 0.6 <=1.5 mg/dL Final     Alkaline Phosphatase   Date Value Ref Range Status   05/12/2022 117 40 - 150 unit/L Final     Aspartate Aminotransferase   Date Value Ref Range Status   05/12/2022 15 5 - 34 unit/L Final     Alanine Aminotransferase   Date Value Ref  Range Status   05/12/2022 12 0 - 55 unit/L Final     Estimated GFR-Non    Date Value Ref Range Status   01/12/2022 45 (L) >>=90 mL/min/1.73 m2 Final     Lab Results   Component Value Date    CHOL 160 01/12/2022     Lab Results   Component Value Date    HDL 40 01/12/2022     No results found for: LDLCALC  Lab Results   Component Value Date    TRIG 86 01/12/2022     No results found for: CHOLHDL  Lab Results   Component Value Date    TSH 2.1907 05/12/2022     Lab Results   Component Value Date    PHUR 5.5 01/12/2022    PROTEINUA 2+ (A) 01/12/2022    GLUCUA Normal 01/12/2022    KETONESU Negative 01/12/2022    OCCULTUA Trace (A) 01/12/2022    NITRITE Negative 01/12/2022    LEUKOCYTESUR Negative 01/12/2022     Lab Results   Component Value Date    HGBA1C 6.3 05/12/2022    HGBA1C 7.6 (H) 01/12/2022     No results found for: MICROALBUR, [id number]   Results for orders placed in visit on 05/12/22    Diabetic Eye Screening Photo         Assessment and Plan (including Health Maintenance)     Plan: RTC 4 months and prn. Labs one week prior to appt.         Health Maintenance Due   Topic Date Due    Hepatitis C Screening  Never done    Diabetes Urine Screening  Never done    Pneumococcal Vaccines (Age 0-64) (1 - PCV) Never done    HIV Screening  Never done    TETANUS VACCINE  Never done    Colorectal Cancer Screening  Never done    Shingles Vaccine (1 of 2) Never done    COVID-19 Vaccine (3 - Booster for Moderna series) 09/13/2021    Influenza Vaccine (1) Never done       Problem List Items Addressed This Visit          Ophtho    AMD (age-related macular degeneration), bilateral    Current Assessment & Plan     Referral to optho to eval/treat.  Endorses some visual changes.         Relevant Orders    Ambulatory referral/consult to Ophthalmology       Cardiac/Vascular    Atrial flutter    Current Assessment & Plan     Keep cardio appts/diagnostics as scheduled.  Continue xarelto and toprol.  ED precautions.          Hyperlipidemia    Current Assessment & Plan     Continue pravastatin.  Encouraged aerobic exercise and dash diet.         Hypertension - Primary    Current Assessment & Plan     At goal.  Continue hydralazine and entresto.  Keep cardio appts/diagnostics as scheduled.  DASH diet.         Acute systolic heart failure    Current Assessment & Plan     Continue entresto.   Keep Cardiology Clinic appts as scheduled.  Last ECHO showed EF of 35%.  Notify the clinic if you gain 3 or more pounds in one day or 5 or more pounds in one week.  Stressed importance of daily morning weights after urination but prior to breakfast on the same scale.  Low Sodium Diet (Dash Diet - less than 2 grams of sodium per day).  Follow a low cholesterol, low saturated fat diet with less that 200mg of cholesterol a day.  Cut down on alcohol if you have more than 1 drink a day (for women) or 2 drinks a day (for men).  Maintain healthy weight with goal BMI <30. Perform 30 minutes of daily physical activity 5 days per week.  Report to ER for chest pain, SOB, difficulty breathing, abdominal distention or significant edema to lower extremities.              Endocrine    Diabetes mellitus    Current Assessment & Plan     A1C 6.4. Previous A1C 6.3.  Refilled metformin ER.  Continue ADA diet; aerobic exercise encouraged.  Check feet daily.         Relevant Medications    metFORMIN (GLUCOPHAGE-XR) 500 MG ER 24hr tablet    Other Relevant Orders    Hemoglobin A1C, POCT    CBC Auto Differential    Comprehensive Metabolic Panel    Hemoglobin A1C    Body mass index (BMI) of 45.0 to 49.9 in adult    Current Assessment & Plan     BMI 45. Goal BMI <30.  Aerobic exercise 150 minutes per week.  Avoid soda, simple sugars, sweets, excessive rice, pasta, potatoes or bread.   Choose brown options when available and portion control.  Limit fast foods and fried foods.   Choose complex carbs in moderation (ex: green, leafy vegetables, beans, oatmeal).  Eat plenty of  fresh fruits and vegetables with lean meats daily.   Consider permanent healthy lifestyle changes.              Other    Tobacco user    Current Assessment & Plan     Smoking cessation discussed.  Encouraged and congratulated on cessation since July.              Health Maintenance Topics with due status: Not Due       Topic Last Completion Date    Lipid Panel 01/12/2022    Foot Exam 05/12/2022    Hemoglobin A1c 05/12/2022    Eye Exam 05/12/2022    Low Dose Statin 09/19/2022       Future Appointments   Date Time Provider Department Center   9/19/2022  8:45 AM Patience Celio, FNP [facility] [place] [facility]   9/29/2022 10:15 AM MARIBELL Lovell   1/19/2023  7:45 AM MARIBELL [place] [facility]            Signature:  MARIBELL Irizarry  [facility]  [address] [facility] [facility] [address]    Date of encounter: 9/19/22

## 2022-09-19 NOTE — ASSESSMENT & PLAN NOTE
At goal.  Continue hydralazine and entresto.  Keep cardio appts/diagnostics as scheduled.  DASH diet.

## 2022-09-19 NOTE — ASSESSMENT & PLAN NOTE
A1C 6.4. Previous A1C 6.3.  Refilled metformin ER.  Continue ADA diet; aerobic exercise encouraged.  Check feet daily.

## 2022-09-19 NOTE — ASSESSMENT & PLAN NOTE
Continue entresto.   Keep Cardiology Clinic appts as scheduled.  Last ECHO showed EF of 35%.  Notify the clinic if you gain 3 or more pounds in one day or 5 or more pounds in one week.  Stressed importance of daily morning weights after urination but prior to breakfast on the same scale.  Low Sodium Diet (Dash Diet - less than 2 grams of sodium per day).  Follow a low cholesterol, low saturated fat diet with less that 200mg of cholesterol a day.  Cut down on alcohol if you have more than 1 drink a day (for women) or 2 drinks a day (for men).  Maintain healthy weight with goal BMI <30. Perform 30 minutes of daily physical activity 5 days per week.  Report to ER for chest pain, SOB, difficulty breathing, abdominal distention or significant edema to lower extremities.

## 2022-09-20 ENCOUNTER — TELEPHONE (OUTPATIENT)
Dept: CARDIOLOGY | Facility: CLINIC | Age: 62
End: 2022-09-20
Payer: COMMERCIAL

## 2022-09-20 NOTE — TELEPHONE ENCOUNTER
Pt called stating he would like a form from his cardiologist stating that he is unable to attend jury duty. Pt has upcoming appt on 9-29-22 with Christin Milligan NP, this can be addressed then.

## 2022-09-28 NOTE — PROGRESS NOTES
CHIEF COMPLAINT:   Chief Complaint   Patient presents with    f/u denies chest pain has JOSHI                   Review of patient's allergies indicates:  No Known Allergies                                       HPI:  Jamey Duke 61 y.o. male with HTN, HLD, h/o persistent AF/AFL (s/p multiple attempted DCCVs but has had recurrence of AFL) , DEEP, and h/o NICMP (EF -45% per TTE 1/2020) who presents for routine follow up and ongoing care post [facility].  He was a former patient of Dr. Gutierrez and Dr. Syed. The patient completed an echocardiogram on 5.27.22 that revealed an ejection fraction of 35% which is reduced from previous EF of 45% per echo in January 2020. He completed a Lexiscan stress test on 6.30.22 that was abnormal revealing  moderate to severe intensity, moderate to large sized, reversible perfusion abnormality that is consistent with ischemia in the basal to apical lateral apical wall(s) in the typical distribution of the LCX territory and a small to moderate sized, moderate intensity, reversible perfusion abnormality that is consistent with ischemia in the basal to mid anterior wall(s) in the typical distribution of the LAD territory.  He underwent a subsequent LHC  on 7.29.22  that revealed normal coronary arteries.    Today the patient reports significant improvement in his symptoms with the addition of Entresto.  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  He also endorses significant dietary changes and states that he has lost approximately 20 lb.  He denies any orthopnea, PND, peripheral edema, lightheadedness, dizziness or syncope.  He reports compliance with his current medications.    PMH: HTN, HLD, h/o persistent AF/AFL, and h/o NICMP  PSH: ablation  Social History: Denies any illicit drug, tobacco use-smokes occasional cigar . Reports occasional  ETOH use  Family History: Father -leaky valve    Previous Diagnostics:       Cardiac catheterization 7.29.22    Conclusion  · The pre-procedure left ventricular end diastolic pressure was 22.  · The estimated blood loss was none.  · The coronary arteries were normal..    FINDINGS  The patient has No CAD  Blood loss:  less than 15 cc.    RECOMMENDATIONS  Medical management -- maximize GDMT  Risk factor modifications -- smoking cessation  Activity -- avoid straining with affected limb for one week  Exercise on regular basis.  Lexiscan stress test 6.20.22       Abnormal myocardial perfusion scan.    There are two significant perfusion abnormalities.    Perfusion Abnormality #1 - There is a moderate to severe intensity, moderate to large sized, reversible perfusion abnormality that is consistent with ischemia in the basal to apical lateral apical wall(s) in the typical distribution of the LCX territory.    Perfusion Abnormality #2 - There is a small to moderate sized, moderate intensity, reversible perfusion abnormality that is consistent with ischemia in the basal to mid anterior wall(s) in the typical distribution of the LAD territory.    There are no other significant perfusion abnormalities.    The gated perfusion images showed an ejection fraction of 36% at rest. The gated perfusion images showed an ejection fraction of 33% post stress.    The EKG portion of this study is abnormal but not diagnostic.    The patient reported no chest pain during the stress test.    There were no arrhythmias during stress.     If clinically indicated, consider further evaluation with coronary angiogram.            ECHO 5.27.22     The left ventricle is normal in size with moderately decreased systolic function.  The estimated ejection fraction is 35%.  Atrial fibrillation observed.  Mild mitral regurgitation.  Mild tricuspid regurgitation.  IVC is dilated.IVC collapses >50%.  Intermediate central venous pressure (8 mmHg).  The  estimated PA systolic pressure is 37 mmHg.  Normal right ventricular size with normal right ventricular systolic function.  There is no pulmonary hypertension.       TTE 1.24.20    1. The study quality is below average.  2. The left ventricle is normal in size. Global left ventricular systolic function is mildly decreased. The left ventricular ejection fraction is 45%.  3. The left atrial diameter is mildly increased. The right atrium is mildly enlarged.  4. Mild (1+) mitral regurgitation. Mild (1+) tricuspid regurgitation.  5. The pulmonary artery systolic pressure is 35 mmHg.     PET 12.6.18   This is a normal perfusion study, no perfusion defects noted.   This scan is suggestive of low risk for future cardiovascular events.   The left ventricular cavity is noted to be moderately enlarged on the stress studies. The stress left ventricular ejection fraction was calculated to be 45% and left ventricular global function is mildly reduced. The rest left ventricular cavity is noted to be mildly enlarged. The rest left ventricular ejection fraction was calculated to be 39% and rest left ventricular global function is moderately reduced.   When compared to the resting ejection fraction (39%), the stress ejection fraction (45%) has increased.   The study quality is good.    Carotid US 2.9.18  1. The study quality is below average.  2. 1-39% stenosis in the proximal right internal carotid artery based on Bluth Criteria.  3. Antegrade right vertebral artery flow.  4. Antegrade left vertebral artery flow    [facility] 1.3.17  EF -20%  Normal coronaries                                                                                                                                                                                                                                                                                                                                                                                                                                                                                                                                  Patient Active Problem List   Diagnosis    Atrial flutter    Hyperlipidemia    Hypertension    Diabetes mellitus    Tobacco user    Body mass index (BMI) of 45.0 to 49.9 in adult    Subclinical hyperthyroidism    Other male erectile dysfunction    Acute systolic heart failure    AMD (age-related macular degeneration), bilateral     Past Surgical History:   Procedure Laterality Date    ANGIOGRAM, CORONARY, WITH LEFT HEART CATHETERIZATION N/A 7/29/2022    Procedure: Angiogram, Coronary, with Left Heart Cath;  Surgeon: Yossi Zuniga MD;  Location: [facility] CATH LAB;  Service: Cardiology;  Laterality: N/A;    Cardioversion x 2      Comprehensive electrophysiologic eval. including insertion & repositioning of multiple electrode catheters with induction or attempted induction of arrhythmias with left atrial pacing & recordingfrom coronary sinus or left atrium  07/10/2018    comprehensive electrophysiologic eval. insertion & repositioning multiple electrode catheters with induction or attempted induction of an arrhythmia with right atrial pacing and recording, right ventricular pacing and recording  07/10/2018    Destruction of Conduction Mechanism, Percutaneous Approach  07/10/2018     Social History     Socioeconomic History    Marital status: Single   Occupational History    Occupation: retired   Tobacco Use    Smoking status: Some Days     Types: Cigars    Smokeless tobacco: Never    Tobacco comments:     Occasionally, last smoked 7/2022   Substance and Sexual Activity    Alcohol use: Yes     Comment: Drinks occasionally beer on weekends    Drug use: Never    Sexual activity: Yes     Partners: Female     Social Determinants of Health     Transportation Needs: Unmet Transportation Needs    Lack of Transportation (Medical): Yes    Lack of Transportation (Non-Medical): No   Housing Stability: Low  Risk     Unable to Pay for Housing in the Last Year: No    Number of Places Lived in the Last Year: 1    Unstable Housing in the Last Year: No        Family History   Problem Relation Age of Onset    Hypertension Mother     Valvular heart disease Father          Current Outpatient Medications:     amiodarone (PACERONE) 200 MG Tab, Take 1 tablet (200 mg total) by mouth once daily., Disp: 90 tablet, Rfl: 3    furosemide (LASIX) 40 MG tablet, Take 1 tablet (40 mg total) by mouth 2 (two) times daily., Disp: 180 tablet, Rfl: 3    hydrALAZINE (APRESOLINE) 50 MG tablet, Take 1 tablet (50 mg total) by mouth 2 (two) times daily., Disp: 180 tablet, Rfl: 3    KLOR-CON M20 20 mEq tablet, Take 1 tablet (20 mEq total) by mouth once daily., Disp: 90 tablet, Rfl: 3    metFORMIN (GLUCOPHAGE-XR) 500 MG ER 24hr tablet, Take 1 tablet (500 mg total) by mouth once daily., Disp: 90 tablet, Rfl: 1    metoprolol succinate (TOPROL-XL) 50 MG 24 hr tablet, Take 1 tablet (50 mg total) by mouth once daily., Disp: 90 tablet, Rfl: 3    pravastatin (PRAVACHOL) 40 MG tablet, Take 1 tablet (40 mg total) by mouth every evening., Disp: 90 tablet, Rfl: 3    blood sugar diagnostic Strp, Inject 1 strip into the skin 2 (two) times a day., Disp: 100 strip, Rfl: 11    blood-glucose meter kit,  True Metrix Glucometer, See Instructions, Use to check blood glucose daily, # 1 EA, 0 Refill(s), Pharmacy: [facility], 177.8, cm, Height/Length Dosing, 01/12/22 9:55:00 CST, 148.4, kg, Weight Dosing, 01/12/22 9:55:00 CST, Disp: , Rfl:     EASY TOUCH TWIST LANCETS 33 gauge Misc, USE AS DIRECTED TO CHECK BLOOD GLUCOSE DAILY, Disp: , Rfl:     LANCETS MISC,  True Metrix Lancets, See Instructions, Use to check blood glucose daily, # 100 EA, 11 Refill(s), Pharmacy: [facility], 177.8, cm, Height/Length Dosing, 01/12/22 9:55:00 CST, 148.4, kg, Weight Dosing, 01/12/22 9:55:00 CST, Disp: , Rfl:     sacubitriL-valsartan (ENTRESTO) 49-51  "mg per tablet, Take 1 tablet by mouth 2 (two) times daily., Disp: 180 tablet, Rfl: 3    XARELTO 20 mg Tab, Take 1 tablet (20 mg total) by mouth once daily., Disp: 90 tablet, Rfl: 3     ROS:                                                                                                                                                                             Review of Systems   Constitutional: Negative.    HENT: Negative.     Eyes: Negative.    Respiratory:  Positive for shortness of breath.    Cardiovascular: Negative.    Gastrointestinal: Negative.    Genitourinary: Negative.    Musculoskeletal: Negative.    Skin: Negative.    Neurological: Negative.    Endo/Heme/Allergies: Negative.    Psychiatric/Behavioral: Negative.        Blood pressure (!) 138/90, pulse 80, temperature 97.7 °F (36.5 °C), temperature source Oral, resp. rate 20, height 5' 10.47" (1.79 m), weight (!) 144.1 kg (317 lb 10.9 oz), SpO2 99 %.   PE:  Physical Exam  Constitutional:       Appearance: Normal appearance.   HENT:      Head: Normocephalic.   Eyes:      Pupils: Pupils are equal, round, and reactive to light.   Cardiovascular:      Rate and Rhythm: Normal rate and regular rhythm.      Pulses: Normal pulses.   Pulmonary:      Effort: Pulmonary effort is normal.   Musculoskeletal:         General: Normal range of motion.   Skin:     General: Skin is warm and dry.   Neurological:      General: No focal deficit present.      Mental Status: He is alert and oriented to person, place, and time.   Psychiatric:         Mood and Affect: Mood normal.         Behavior: Behavior normal.              ASSESSMENT/PLAN:  JOSHI   - reports significant improvement in shortness of breath with added Entresto   - s/p LHC - normal coronary arteries 7.29.22  - Lexiscan stress test -  moderate to severe intensity, moderate to large sized, reversible perfusion abnormality that is consistent with ischemia in the basal to apical lateral apical wall(s) in the typical " distribution of the LCX territory and a small to moderate sized, moderate intensity, reversible perfusion abnormality that is consistent with ischemia in the basal to mid anterior wall(s) in the typical distribution of the LAD territory.    - LHC- EF-20%, Normal coronaries (1.3.17)   - Continue Toprol succinate, statin       h/o persistent AF/AFL Status post RFA on (7.10.18)   - Denies palpitations  - Has multiple attempted DCCVs but has had recurrence of AFL  - Continue with amiodarone 200 mg p.o. daily and Toprol succinate 50qd  - Latest CXR for Amio monitoring -stable, TSH normal  - Continue Xarelto 20 mg p.o. daily. Denies any adverse bleeding issues  - Last EKG - Atrial flutter   - Discussed with patient the option of re- attempting cardioversion, however patient reports that he would like to hold off at this time citing that he feels good.  Will repeat echocardiogram to reassess LV function.    NICMP/HFrEF  - LVEF - 35% per TTE 5.27.22 reduced from 45% per TTE (1.24.20)  - Reports improved JOSHI. Denies CP  - Euvolemic upon exam, warm and dry   - s/p LHC- normal coronaries (7.29.22)  - Normal perfusion study, no perfusion defects noted (12.6.18)  - LHC- EF-20%, Normal coronaries (1.3.17)  - Euvolemic upon exam warm and dry  - Continue GDMT: Toprol succinate 50 daily. Will increase Entresto to moderate dose 49/51 mg BID   - Nurse visit in 2-3 weeks for BP check   - BMP in 2 weeks  - Continue Lasix 40 as directed  - Counseled patient on low-sodium diet   - Repeat Echocardiogram in 3 months to reassess LV function after optimization of GDMT       HTN  - Continue Toprol. Increasing Entresto to moderate dose   - Counseled on low-sodium diet     Diabetes  - A1C- 6.3  - Continue management per PCP     HLD- LDL -103- improved   - Continue Pravastatin 40 mg po daily.  - Counseled patient on low-cholesterol, low-fat diet, and encourage exercise as tolerated.    Carotid Artery disease  - 1-39% stenosis in the proximal  right internal carotid artery based on Bluth Criteria. (2018)  - Denies any dizziness, syncope or focal weakness    Tobacco use  - Reports occasional cigar use.   - Counseled on importance of smoking cessation    Increase Entresto to 49/51 mg BID  Nurse visit in 2-3 weeks for BP/HR check   BMP in 2 weeks   Echocardiogram in 3 months   Follow-up in cardiology clinic in 4 months or sooner if needed   Follow up with PCP as directed

## 2022-09-29 ENCOUNTER — OFFICE VISIT (OUTPATIENT)
Dept: CARDIOLOGY | Facility: CLINIC | Age: 62
End: 2022-09-29
Payer: COMMERCIAL

## 2022-09-29 VITALS
SYSTOLIC BLOOD PRESSURE: 138 MMHG | HEIGHT: 70 IN | DIASTOLIC BLOOD PRESSURE: 90 MMHG | RESPIRATION RATE: 20 BRPM | WEIGHT: 315 LBS | OXYGEN SATURATION: 99 % | BODY MASS INDEX: 45.1 KG/M2 | HEART RATE: 80 BPM | TEMPERATURE: 98 F

## 2022-09-29 DIAGNOSIS — E78.2 MIXED HYPERLIPIDEMIA: ICD-10-CM

## 2022-09-29 DIAGNOSIS — I50.20 HFREF (HEART FAILURE WITH REDUCED EJECTION FRACTION): Primary | ICD-10-CM

## 2022-09-29 DIAGNOSIS — I48.92 ATRIAL FLUTTER, UNSPECIFIED TYPE: ICD-10-CM

## 2022-09-29 DIAGNOSIS — I50.21 ACUTE SYSTOLIC HEART FAILURE: ICD-10-CM

## 2022-09-29 DIAGNOSIS — I48.0 PAROXYSMAL ATRIAL FIBRILLATION: ICD-10-CM

## 2022-09-29 DIAGNOSIS — Z72.0 TOBACCO USER: ICD-10-CM

## 2022-09-29 DIAGNOSIS — I10 PRIMARY HYPERTENSION: ICD-10-CM

## 2022-09-29 PROCEDURE — 3044F PR MOST RECENT HEMOGLOBIN A1C LEVEL <7.0%: ICD-10-PCS | Mod: CPTII,,, | Performed by: NURSE PRACTITIONER

## 2022-09-29 PROCEDURE — 1159F MED LIST DOCD IN RCRD: CPT | Mod: CPTII,,, | Performed by: NURSE PRACTITIONER

## 2022-09-29 PROCEDURE — 1159F PR MEDICATION LIST DOCUMENTED IN MEDICAL RECORD: ICD-10-PCS | Mod: CPTII,,, | Performed by: NURSE PRACTITIONER

## 2022-09-29 PROCEDURE — 3008F PR BODY MASS INDEX (BMI) DOCUMENTED: ICD-10-PCS | Mod: CPTII,,, | Performed by: NURSE PRACTITIONER

## 2022-09-29 PROCEDURE — 3075F PR MOST RECENT SYSTOLIC BLOOD PRESS GE 130-139MM HG: ICD-10-PCS | Mod: CPTII,,, | Performed by: NURSE PRACTITIONER

## 2022-09-29 PROCEDURE — 99214 PR OFFICE/OUTPT VISIT, EST, LEVL IV, 30-39 MIN: ICD-10-PCS | Mod: S$PBB,,, | Performed by: NURSE PRACTITIONER

## 2022-09-29 PROCEDURE — 99215 OFFICE O/P EST HI 40 MIN: CPT | Mod: PBBFAC | Performed by: NURSE PRACTITIONER

## 2022-09-29 PROCEDURE — 4010F PR ACE/ARB THEARPY RXD/TAKEN: ICD-10-PCS | Mod: CPTII,,, | Performed by: NURSE PRACTITIONER

## 2022-09-29 PROCEDURE — 3044F HG A1C LEVEL LT 7.0%: CPT | Mod: CPTII,,, | Performed by: NURSE PRACTITIONER

## 2022-09-29 PROCEDURE — 3008F BODY MASS INDEX DOCD: CPT | Mod: CPTII,,, | Performed by: NURSE PRACTITIONER

## 2022-09-29 PROCEDURE — 1160F RVW MEDS BY RX/DR IN RCRD: CPT | Mod: CPTII,,, | Performed by: NURSE PRACTITIONER

## 2022-09-29 PROCEDURE — 1160F PR REVIEW ALL MEDS BY PRESCRIBER/CLIN PHARMACIST DOCUMENTED: ICD-10-PCS | Mod: CPTII,,, | Performed by: NURSE PRACTITIONER

## 2022-09-29 PROCEDURE — 3075F SYST BP GE 130 - 139MM HG: CPT | Mod: CPTII,,, | Performed by: NURSE PRACTITIONER

## 2022-09-29 PROCEDURE — 3080F DIAST BP >= 90 MM HG: CPT | Mod: CPTII,,, | Performed by: NURSE PRACTITIONER

## 2022-09-29 PROCEDURE — 99214 OFFICE O/P EST MOD 30 MIN: CPT | Mod: S$PBB,,, | Performed by: NURSE PRACTITIONER

## 2022-09-29 PROCEDURE — 4010F ACE/ARB THERAPY RXD/TAKEN: CPT | Mod: CPTII,,, | Performed by: NURSE PRACTITIONER

## 2022-09-29 PROCEDURE — 3080F PR MOST RECENT DIASTOLIC BLOOD PRESSURE >= 90 MM HG: ICD-10-PCS | Mod: CPTII,,, | Performed by: NURSE PRACTITIONER

## 2022-09-29 RX ORDER — RIVAROXABAN 20 MG/1
20 TABLET, FILM COATED ORAL DAILY
Qty: 90 TABLET | Refills: 3 | Status: SHIPPED | OUTPATIENT
Start: 2022-09-29 | End: 2023-09-15 | Stop reason: SDUPTHER

## 2022-09-29 RX ORDER — SACUBITRIL AND VALSARTAN 49; 51 MG/1; MG/1
1 TABLET, FILM COATED ORAL 2 TIMES DAILY
Qty: 180 TABLET | Refills: 3 | Status: SHIPPED | OUTPATIENT
Start: 2022-09-29 | End: 2022-10-17 | Stop reason: SDUPTHER

## 2022-09-29 NOTE — PATIENT INSTRUCTIONS
Increase Entresto to 49/51 mg BID  Nurse visit in 2-3 weeks for BP/HR check   BMP in 2 weeks  -labs  Echocardiogram in 3 months - call [phone] to schedule        Follow-up in cardiology clinic in 4 months or sooner if needed   Follow up with PCP as directed

## 2022-10-17 ENCOUNTER — CLINICAL SUPPORT (OUTPATIENT)
Dept: CARDIOLOGY | Facility: CLINIC | Age: 62
End: 2022-10-17
Payer: COMMERCIAL

## 2022-10-17 VITALS
SYSTOLIC BLOOD PRESSURE: 127 MMHG | TEMPERATURE: 98 F | RESPIRATION RATE: 20 BRPM | BODY MASS INDEX: 45.1 KG/M2 | OXYGEN SATURATION: 100 % | DIASTOLIC BLOOD PRESSURE: 89 MMHG | WEIGHT: 315 LBS | HEIGHT: 70 IN | HEART RATE: 82 BPM

## 2022-10-17 DIAGNOSIS — I50.20 HFREF (HEART FAILURE WITH REDUCED EJECTION FRACTION): ICD-10-CM

## 2022-10-17 DIAGNOSIS — I10 HYPERTENSION, UNSPECIFIED TYPE: Primary | ICD-10-CM

## 2022-10-17 PROCEDURE — 99211 OFF/OP EST MAY X REQ PHY/QHP: CPT | Mod: S$PBB,,, | Performed by: NURSE PRACTITIONER

## 2022-10-17 PROCEDURE — 99213 OFFICE O/P EST LOW 20 MIN: CPT | Mod: PBBFAC

## 2022-10-17 PROCEDURE — 99211 PR OFFICE/OUTPT VISIT, EST, LEVL I: ICD-10-PCS | Mod: S$PBB,,, | Performed by: NURSE PRACTITIONER

## 2022-10-17 RX ORDER — SACUBITRIL AND VALSARTAN 49; 51 MG/1; MG/1
1 TABLET, FILM COATED ORAL 2 TIMES DAILY
Qty: 180 TABLET | Refills: 3 | Status: SHIPPED | OUTPATIENT
Start: 2022-10-17 | End: 2023-02-07 | Stop reason: SDUPTHER

## 2022-10-17 NOTE — PROGRESS NOTES
Here today for follow up nurse visit to check blood pressure. Patient has no complaints. Today's BP WNL. Per Christin Milligan NP, continue to take medication as directed, follow a low sodium heart healthy diet, keep all scheduled follow up appointments, and call clinic with questions or concerns. Patient verbalized understanding.

## 2023-01-03 ENCOUNTER — TELEPHONE (OUTPATIENT)
Dept: CARDIOLOGY | Facility: CLINIC | Age: 63
End: 2023-01-03
Payer: COMMERCIAL

## 2023-01-03 NOTE — TELEPHONE ENCOUNTER
Mr. Duke informed to call [phone] to reschedule the echocardiogram as his insurance has not approved it yet. He verbalizes understanding and agrees.

## 2023-01-26 ENCOUNTER — OFFICE VISIT (OUTPATIENT)
Dept: INTERNAL MEDICINE | Facility: CLINIC | Age: 63
End: 2023-01-26
Payer: COMMERCIAL

## 2023-01-26 VITALS
HEIGHT: 70 IN | HEART RATE: 75 BPM | BODY MASS INDEX: 45.1 KG/M2 | WEIGHT: 315 LBS | RESPIRATION RATE: 18 BRPM | SYSTOLIC BLOOD PRESSURE: 124 MMHG | DIASTOLIC BLOOD PRESSURE: 82 MMHG | TEMPERATURE: 98 F

## 2023-01-26 DIAGNOSIS — E11.39 TYPE 2 DIABETES MELLITUS WITH OTHER OPHTHALMIC COMPLICATION, WITHOUT LONG-TERM CURRENT USE OF INSULIN: ICD-10-CM

## 2023-01-26 DIAGNOSIS — I50.21 ACUTE SYSTOLIC HEART FAILURE: ICD-10-CM

## 2023-01-26 DIAGNOSIS — E78.2 MIXED HYPERLIPIDEMIA: Primary | ICD-10-CM

## 2023-01-26 DIAGNOSIS — H35.30 AMD (AGE-RELATED MACULAR DEGENERATION), BILATERAL: ICD-10-CM

## 2023-01-26 DIAGNOSIS — Z12.5 SCREENING FOR MALIGNANT NEOPLASM OF PROSTATE: ICD-10-CM

## 2023-01-26 DIAGNOSIS — Z00.00 WELLNESS EXAMINATION: ICD-10-CM

## 2023-01-26 DIAGNOSIS — N18.31 STAGE 3A CHRONIC KIDNEY DISEASE: ICD-10-CM

## 2023-01-26 DIAGNOSIS — I48.92 ATRIAL FLUTTER, UNSPECIFIED TYPE: ICD-10-CM

## 2023-01-26 DIAGNOSIS — I10 PRIMARY HYPERTENSION: ICD-10-CM

## 2023-01-26 PROCEDURE — 1160F PR REVIEW ALL MEDS BY PRESCRIBER/CLIN PHARMACIST DOCUMENTED: ICD-10-PCS | Mod: CPTII,,, | Performed by: NURSE PRACTITIONER

## 2023-01-26 PROCEDURE — 3008F BODY MASS INDEX DOCD: CPT | Mod: CPTII,,, | Performed by: NURSE PRACTITIONER

## 2023-01-26 PROCEDURE — 99214 OFFICE O/P EST MOD 30 MIN: CPT | Mod: S$PBB,,, | Performed by: NURSE PRACTITIONER

## 2023-01-26 PROCEDURE — 3074F SYST BP LT 130 MM HG: CPT | Mod: CPTII,,, | Performed by: NURSE PRACTITIONER

## 2023-01-26 PROCEDURE — 3074F PR MOST RECENT SYSTOLIC BLOOD PRESSURE < 130 MM HG: ICD-10-PCS | Mod: CPTII,,, | Performed by: NURSE PRACTITIONER

## 2023-01-26 PROCEDURE — 99214 PR OFFICE/OUTPT VISIT, EST, LEVL IV, 30-39 MIN: ICD-10-PCS | Mod: S$PBB,,, | Performed by: NURSE PRACTITIONER

## 2023-01-26 PROCEDURE — 1159F PR MEDICATION LIST DOCUMENTED IN MEDICAL RECORD: ICD-10-PCS | Mod: CPTII,,, | Performed by: NURSE PRACTITIONER

## 2023-01-26 PROCEDURE — 3079F PR MOST RECENT DIASTOLIC BLOOD PRESSURE 80-89 MM HG: ICD-10-PCS | Mod: CPTII,,, | Performed by: NURSE PRACTITIONER

## 2023-01-26 PROCEDURE — 3008F PR BODY MASS INDEX (BMI) DOCUMENTED: ICD-10-PCS | Mod: CPTII,,, | Performed by: NURSE PRACTITIONER

## 2023-01-26 PROCEDURE — 3079F DIAST BP 80-89 MM HG: CPT | Mod: CPTII,,, | Performed by: NURSE PRACTITIONER

## 2023-01-26 PROCEDURE — 1159F MED LIST DOCD IN RCRD: CPT | Mod: CPTII,,, | Performed by: NURSE PRACTITIONER

## 2023-01-26 PROCEDURE — 99214 OFFICE O/P EST MOD 30 MIN: CPT | Mod: PBBFAC | Performed by: NURSE PRACTITIONER

## 2023-01-26 PROCEDURE — 1160F RVW MEDS BY RX/DR IN RCRD: CPT | Mod: CPTII,,, | Performed by: NURSE PRACTITIONER

## 2023-01-26 RX ORDER — METFORMIN HYDROCHLORIDE 500 MG/1
500 TABLET, EXTENDED RELEASE ORAL DAILY
Qty: 90 TABLET | Refills: 3 | Status: SHIPPED | OUTPATIENT
Start: 2023-01-26 | End: 2023-12-15 | Stop reason: SDUPTHER

## 2023-01-26 NOTE — PROGRESS NOTES
Geetha Pickens, MARIBELL   [facility]  [address] [address] [address]      PATIENT NAME: Jamey Duke  : 1960  DATE: 23  MRN: [id number]      Reason for Visit / Chief Complaint: Hypertension (Lab results, refused vaccines)       History of Present Illness / Problem Focused Workflow     Jamey Duke is a 62 y.o. Black or  male presents to the clinic for DM f/u. PMH DM, HTN, HLD, h/o persistent AF/AFL, DEEP, and h/o NICMP w/multiple DCCVs -AFL recurred, macular degeneration, and ED. Followed by [facility] cardio clinic. Has optho visit in [place] on 23 (will sign release when at visit for records). Has echo scheduled on 23 and cardio f/u on 23.     Today, reports SOB and BLE has significantly improved with entresto. Is able to complete tasks and ambulate/walk with ease. Is following ADA/low sodium diet and has lost 20 lbs since last visit. CBGs ranging  when checked qam and sometimes in the evening. Has continued smoking cessation since July. Labs reviewed with pt. Denies chest pain, shortness of breath, cough, fever, headache, dizziness, weakness, abdominal pain, nausea, vomiting, diarrhea, constipation, dysuria, depression, anxiety, SI/HI.    Review of Systems     Review of Systems     See HPI for details    Constitutional: Denies Change in appetite. Denies Chills. Denies Fever. Denies Night sweats.  Eye: Denies Blurred vision. Denies Discharge. Denies Eye pain.  ENT: Denies Decreased hearing. Denies Sore throat. Denies Swollen glands.  Respiratory: Denies Cough. Denies Shortness of breath. Denies Shortness of breath with exertion. Denies Wheezing.  Cardiovascular: Denies Chest pain at rest. Denies Chest pain with exertion. Denies Irregular heartbeat. Denies Palpitations. Denies Edema.  Gastrointestinal: Denies Abdominal pain. Denies Diarrhea. Denies Nausea. Denies Vomiting. Denies Hematemesis or  Hematochezia.  Genitourinary: Denies Dysuria. Denies Urinary frequency. Denies Urinary urgency. Denies Blood in urine.  Endocrine: Denies Cold intolerance. Denies Excessive thirst. Denies Heat intolerance. Denies Weight loss. Denies Weight gain.  Musculoskeletal: Denies Painful joints. Denies Weakness.  Integumentary: Denies Rash. Denies Itching. Denies Dry skin.  Neurologic: Denies Dizziness. Denies Fainting. Denies Headache.  Psychiatric: Denies Depression. Denies Anxiety. Denies Suicidal/Homicidal ideations.    All Other ROS: Negative except as stated in HPI.     Medical / Surgical / Social / Family History       ----------------------------  Atrial flutter  Diabetes mellitus  Hypercholesteremia  Hypertension  Morbid obesity  Obese  Tobacco user     Past Surgical History:   Procedure Laterality Date    ANGIOGRAM, CORONARY, WITH LEFT HEART CATHETERIZATION N/A 7/29/2022    Procedure: Angiogram, Coronary, with Left Heart Cath;  Surgeon: Yossi Zuniga MD;  Location[facility] C[facility] LAB;  Service: Cardiology;  Laterality: N/A;    Cardioversion x 2      Comprehensive electrophysiologic eval. including insertion & repositioning of multiple electrode catheters with induction or attempted induction of arrhythmias with left atrial pacing & recordingfrom coronary sinus or left atrium  07/10/2018    comprehensive electrophysiologic eval. insertion & repositioning multiple electrode catheters with induction or attempted induction of an arrhythmia with right atrial pacing and recording, right ventricular pacing and recording  07/10/2018    Destruction of Conduction Mechanism, Percutaneous Approach  07/10/2018       Social History     Socioeconomic History    Marital status: Single   Occupational History    Occupation: retired   Tobacco Use    Smoking status: Some Days     Types: Cigars    Smokeless tobacco: Never    Tobacco comments:     Occasionally, last smoked 12/2022   Substance and Sexual Activity    Alcohol use: Yes      Comment: Drinks occasionally beer on weekends    Drug use: Never    Sexual activity: Yes     Partners: Female     Social Determinants of Health     Transportation Needs: Unmet Transportation Needs    Lack of Transportation (Medical): Yes    Lack of Transportation (Non-Medical): No   Social Connections: Moderately Isolated    Frequency of Communication with Friends and Family: More than three times a week    Frequency of Social Gatherings with Friends and Family: More than three times a week    Attends Protestant Services: More than 4 times per year    Active Member of Clubs or Organizations: No    Attends Club or Organization Meetings: Never    Marital Status: Never    Housing Stability: Low Risk     Unable to Pay for Housing in the Last Year: No    Number of Places Lived in the Last Year: 1    Unstable Housing in the Last Year: No        Family History   Problem Relation Age of Onset    Hypertension Mother     Valvular heart disease Father         Medications and Allergies     Medications  Outpatient Medications Marked as Taking for the 1/26/23 encounter (Office Visit) with MARIBELL Clifton   Medication Sig Dispense Refill    amiodarone (PACERONE) 200 MG Tab Take 1 tablet (200 mg total) by mouth once daily. 90 tablet 3    blood sugar diagnostic Strp Inject 1 strip into the skin 2 (two) times a day. 100 strip 11    blood-glucose meter kit   True Metrix Glucometer, See Instructions, Use to check blood glucose daily, # 1 EA, 0 Refill(s), Pharmacy: [facility], 177.8, cm, Height/Length Dosing, 01/12/22 9:55:00 CST, 148.4, kg, Weight Dosing, 01/12/22 9:55:00 CST      EASY TOUCH TWIST LANCETS 33 gauge Misc USE AS DIRECTED TO CHECK BLOOD GLUCOSE DAILY      furosemide (LASIX) 40 MG tablet Take 1 tablet (40 mg total) by mouth 2 (two) times daily. 180 tablet 3    hydrALAZINE (APRESOLINE) 50 MG tablet Take 1 tablet (50 mg total) by mouth 2 (two) times daily. 180 tablet 3    KLOR-CON M20 20 mEq  "tablet Take 1 tablet (20 mEq total) by mouth once daily. 90 tablet 3    LANCETS MISC   True Metrix Lancets, See Instructions, Use to check blood glucose daily, # 100 EA, 11 Refill(s), Pharmacy: [facility], 177.8, cm, Height/Length Dosing, 01/12/22 9:55:00 CST, 148.4, kg, Weight Dosing, 01/12/22 9:55:00 CST      metoprolol succinate (TOPROL-XL) 50 MG 24 hr tablet Take 1 tablet (50 mg total) by mouth once daily. 90 tablet 3    pravastatin (PRAVACHOL) 40 MG tablet Take 1 tablet (40 mg total) by mouth every evening. 90 tablet 3    sacubitriL-valsartan (ENTRESTO) 49-51 mg per tablet Take 1 tablet by mouth 2 (two) times daily. 180 tablet 3    XARELTO 20 mg Tab Take 1 tablet (20 mg total) by mouth once daily. 90 tablet 3    [DISCONTINUED] metFORMIN (GLUCOPHAGE-XR) 500 MG ER 24hr tablet Take 1 tablet (500 mg total) by mouth once daily. 90 tablet 1         Allergies  Review of patient's allergies indicates:  No Known Allergies    Physical Examination     /82 (BP Location: Left arm, Patient Position: Sitting, BP Method: X-Large (Manual))   Pulse 75   Temp 97.7 °F (36.5 °C) (Oral)   Resp 18   Ht 5' 10.08" (1.78 m)   Wt (!) 145.3 kg (320 lb 6.4 oz)   BMI 45.87 kg/m²     Physical Exam  Constitutional:       Appearance: He is morbidly obese.   Cardiovascular:      Rate and Rhythm: Rhythm irregularly irregular.       General: Alert and oriented, No acute distress.  Head: Normocephalic, Atraumatic.  Eye: Pupils are equal, round and reactive to light, Extraocular movements are intact, Sclera non-icteric.  Ears/Nose/Throat: Normal, Mucosa moist,Clear.  Neck/Thyroid: Supple, Non-tender, No carotid bruit, No lymphadenopathy, No JVD, Full range of motion.  Respiratory: Clear to auscultation bilaterally; No wheezes, rales or rhonchi,Non-labored respirations, Symmetrical chest wall expansion.  Cardiovascular: Regular rate and rhythm, S1/S2 normal, No murmurs, rubs or gallops.  Gastrointestinal: Soft, " Non-tender, Non-distended, Normal bowel sounds, No palpable organomegaly.  Musculoskeletal: Normal range of motion.  Integumentary: Warm, Dry, Intact, No suspicious lesions or rashes.  Extremities: No clubbing, cyanosis or edema  Neurologic: No focal deficits, Cranial Nerves II-XII are grossly intact, Motor strength normal upper and lower extremities, Sensory exam intact.  Psychiatric: Normal interaction, Coherent speech, Euthymic mood, Appropriate affect       Results     Lab Results   Component Value Date    WBC 8.8 01/26/2023    HGB 14.8 01/26/2023    HCT 46.9 01/26/2023     01/26/2023    CHOL 160 01/12/2022    TRIG 86 01/12/2022    ALT 15 01/26/2023    AST 16 01/26/2023     01/26/2023    K 4.6 01/26/2023    CREATININE 1.58 (H) 01/26/2023    BUN 16.9 01/26/2023    CO2 24 01/26/2023    TSH 2.1907 05/12/2022    PSA 0.29 01/12/2022    INR 1.63 (H) 07/26/2022    HGBA1C 5.9 01/26/2023         Assessment and Plan (including Health Maintenance)     Plan:     1. Type 2 diabetes mellitus with other ophthalmic complication, without long-term current use of insulin  A1C 5.9 at goal. Previous A1C 6.4.   Refilled metformin.  Continue medications as prescribed.  Follow ADA diet.  Avoid soda, simple sweets, and limit rice/pasta/bread/starches and consume brown options when possible.   Maintain healthy weight with BMI goal <30.   Perform aerobic exercise for 150 minutes per week (or 5 days a week for 30 minutes each day).   Examine feet daily.   Obtain annual dilated eye exam.  Eye exam: 5/12/22  Foot exam: 5/12/22    - Microalbumin/Creatinine Ratio, Urine; Future  - Urinalysis, Reflex to Urine Culture; Future  - Comprehensive Metabolic Panel; Future  - Hemoglobin A1C; Future    2. Primary hypertension  Continue entresto and hydralazine.  Follow a low sodium (less than 2 grams of sodium per day), DASH diet.   Continue medications as prescribed.  Monitor blood pressure and report any consistent values greater than  140/90 and keep a log.  Encouraged continued smoking cessation to aid in BP reduction and co-morbidities.   Maintain healthy weight with a BMI goal of <30.   Aerobic exercise for 150 minutes per week (or 5 days a week for 30 minutes each day).      3. Mixed hyperlipidemia  FLP ordered.  Continue pravastatin.  Follow a low cholesterol, low saturated fat diet with less than 200 mg of cholesterol a day.   Avoid fried foods and high saturated fats (meadows, sausage, cookies, cakes, chips, cheese, whole milk, butter, mayonnaise, creamy dressings, gravy and cream sauces).  Add flax seed or fish oil supplements to diet.   Increase dietary fiber.   Regular exercise improves cholesterol levels.  Physical activity 5 times a week for 30 minutes per day (or 150 minutes per week).   Stressed importance of dietary modifications.    - Lipid Panel; Future    4. Acute systolic heart failure  Continue entresto, metoprolol and lasix.   Keep Cardiology Clinic appts as scheduled.  Notify the clinic if you gain 3 or more pounds in one day or 5 or more pounds in one week.  Stressed importance of daily morning weights after urination but prior to breakfast on the same scale.  Low Sodium Diet (Dash Diet - less than 2 grams of sodium per day).  Follow a low cholesterol, low saturated fat diet with less that 200mg of cholesterol a day.  Cut down on alcohol if you have more than 1 drink a day (for women) or 2 drinks a day (for men).  Maintain healthy weight with goal BMI <30. Perform 30 minutes of daily physical activity 5 days per week.  Report to ER for chest pain, SOB, difficulty breathing, abdominal distention or significant edema to lower extremities.      5. AMD (age-related macular degeneration), bilateral  Keep optho visit at scheduled later this month.  Instructed to sign release of records while at visit; understanding voiced.    6. Body mass index (BMI) of 45.0 to 49.9 in adult  BMI 45. Goal BMI <30.  Encouraged and congratulated on  weight loss.  Aerobic exercise 150 minutes per week.  Avoid soda, simple sugars, sweets, excessive rice, pasta, potatoes or bread.   Choose brown options when available and portion control.  Limit fast foods and fried foods.   Choose complex carbs in moderation (ex: green, leafy vegetables, beans, oatmeal).  Eat plenty of fresh fruits and vegetables with lean meats daily.   Consider permanent healthy lifestyle changes.    7. CKD  GFR 49.  Desires to wait until next visit for renal referral.   Renoprotective measures discussed:  -Follow renal diet (reduce intake of nuts, peanut butter, milk, cheese, dried beans, peas) and Low Sodium Diet (less than 2 grams per day).  -Control high blood pressure ( goal BP < 130/80, please record BP at home every day and bring log to next office visit)  -Exercise at least 30 minutes a day, 5 days a week.  -Maintain healthy weight.  -Decrease or stop alcohol use.  -Do not smoke.  -Stay well hydrated.  -Receive Pneumovax, Flu, and HBV vaccines if indicated.  -Do not take NSAIDs (Ibuprofen, Naproxen, Aleve, Advil, Toradol, Mobic), may take only Tylenol as needed for pain/headaches.  -Take cholesterol-lowering medications as prescribed (LDL goal <100).    8. Atrial flutter  Keep cardio appts as scheduled.  Continue amiodorone and xarelto.    Problem List Items Addressed This Visit          Ophtho    AMD (age-related macular degeneration), bilateral       Cardiac/Vascular    Atrial flutter    Hyperlipidemia - Primary    Relevant Orders    Lipid Panel    Hypertension    Acute systolic heart failure       Renal/    Stage 3a chronic kidney disease       Endocrine    Diabetes mellitus    Relevant Medications    metFORMIN (GLUCOPHAGE-XR) 500 MG ER 24hr tablet    Other Relevant Orders    Microalbumin/Creatinine Ratio, Urine    Urinalysis, Reflex to Urine Culture    Comprehensive Metabolic Panel    Hemoglobin A1C    Body mass index (BMI) of 45.0 to 49.9 in adult     Other Visit Diagnoses        Screening for malignant neoplasm of prostate        Relevant Orders    PSA, Screening    Wellness examination        Relevant Orders    Follow-up primary physician              Health Maintenance Due   Topic Date Due    Hepatitis C Screening  Never done    Diabetes Urine Screening  Never done    HIV Screening  Never done    TETANUS VACCINE  Never done    Colorectal Cancer Screening  Never done    Shingles Vaccine (1 of 2) Never done    COVID-19 Vaccine (3 - Booster for Moderna series) 06/08/2021    Influenza Vaccine (1) Never done    Lipid Panel  01/12/2023       Follow up in about 6 months (around 7/26/2023). Labs one week prior to appt. Schedule Saturday Medicare Wellness Visit.         Signature:  MARIBELL Irizarry  [facility]  [address] [place] [address]

## 2023-01-30 ENCOUNTER — HOSPITAL ENCOUNTER (OUTPATIENT)
Dept: CARDIOLOGY | Facility: HOSPITAL | Age: 63
Discharge: HOME | End: 2023-01-30
Attending: NURSE PRACTITIONER
Payer: COMMERCIAL

## 2023-01-30 VITALS — HEIGHT: 70 IN | BODY MASS INDEX: 45.1 KG/M2 | WEIGHT: 315 LBS

## 2023-01-30 DIAGNOSIS — I50.20 HFREF (HEART FAILURE WITH REDUCED EJECTION FRACTION): ICD-10-CM

## 2023-01-30 LAB
AV INDEX (PROSTH): 0.58
AV MEAN GRADIENT: 3 MMHG
AV PEAK GRADIENT: 5 MMHG
AV VALVE AREA: 1.85 CM2
AV VELOCITY RATIO: 0.62
BSA FOR ECHO PROCEDURE: 2.68 M2
CV ECHO LV RWT: 0.68 CM
DOP CALC AO PEAK VEL: 1.1 M/S
DOP CALC AO VTI: 19.7 CM
DOP CALC LVOT AREA: 3.2 CM2
DOP CALC LVOT DIAMETER: 2.01 CM
DOP CALC LVOT PEAK VEL: 0.68 M/S
DOP CALC LVOT STROKE VOLUME: 36.47 CM3
DOP CALC MV VTI: 20.6 CM
DOP CALCLVOT PEAK VEL VTI: 11.5 CM
E WAVE DECELERATION TIME: 128.57 MSEC
E/A RATIO: 2.51
ECHO LV POSTERIOR WALL: 1.44 CM (ref 0.6–1.1)
EJECTION FRACTION: 40 %
FRACTIONAL SHORTENING: 19 % (ref 28–44)
HR MV ECHO: 96 BPM
INTERVENTRICULAR SEPTUM: 1.31 CM (ref 0.6–1.1)
LEFT ATRIUM SIZE: 3.92 CM
LEFT INTERNAL DIMENSION IN SYSTOLE: 3.45 CM (ref 2.1–4)
LEFT VENTRICLE DIASTOLIC VOLUME INDEX: 31.87 ML/M2
LEFT VENTRICLE DIASTOLIC VOLUME: 81.26 ML
LEFT VENTRICLE MASS INDEX: 87 G/M2
LEFT VENTRICLE SYSTOLIC VOLUME INDEX: 19.3 ML/M2
LEFT VENTRICLE SYSTOLIC VOLUME: 49.09 ML
LEFT VENTRICULAR INTERNAL DIMENSION IN DIASTOLE: 4.26 CM (ref 3.5–6)
LEFT VENTRICULAR MASS: 222.88 G
LV LATERAL E/E' RATIO: 8 M/S
LVOT MG: 1.06 MMHG
LVOT MV: 0.49 CM/S
MV MEAN GRADIENT: 2 MMHG
MV PEAK A VEL: 0.35 M/S
MV PEAK E VEL: 0.88 M/S
MV PEAK GRADIENT: 4 MMHG
MV VALVE AREA BY CONTINUITY EQUATION: 1.77 CM2
PISA MRMAX VEL: 5.74 M/S
PISA TR MAX VEL: 3.37 M/S
RA WIDTH: 6 CM
TDI LATERAL: 0.11 M/S
TR MAX PG: 45 MMHG
TRICUSPID ANNULAR PLANE SYSTOLIC EXCURSION: 1.7 CM

## 2023-01-30 PROCEDURE — C8929 TTE W OR WO FOL WCON,DOPPLER: HCPCS

## 2023-01-30 PROCEDURE — 25500020 PHARM REV CODE 255: Performed by: NURSE PRACTITIONER

## 2023-01-30 RX ADMIN — HUMAN ALBUMIN MICROSPHERES AND PERFLUTREN 0.66 MG: 10; .22 INJECTION, SOLUTION INTRAVENOUS at 09:01

## 2023-02-07 DIAGNOSIS — I50.20 HFREF (HEART FAILURE WITH REDUCED EJECTION FRACTION): ICD-10-CM

## 2023-02-07 RX ORDER — SACUBITRIL AND VALSARTAN 49; 51 MG/1; MG/1
1 TABLET, FILM COATED ORAL 2 TIMES DAILY
Qty: 180 TABLET | Refills: 3 | Status: SHIPPED | OUTPATIENT
Start: 2023-02-07 | End: 2023-03-28 | Stop reason: SDUPTHER

## 2023-03-13 NOTE — PROGRESS NOTES
CHIEF COMPLAINT:   Chief Complaint   Patient presents with    F/U 4 month visit     Denies any cardiac problems                   Review of patient's allergies indicates:  No Known Allergies                                       HPI:  Jamey Duke 62 y.o. male with HTN, HLD, h/o persistent AF/AFL (s/p multiple attempted DCCVs but has had recurrence of AFL) , DEEP, and h/o NICMP (EF -40% per TTE 1.30.23) who presents for routine follow up and results of testing.  He was a former patient of Dr. Gutierrez and Dr. Syed. The patient completed an echocardiogram on 5.27.22 that revealed an ejection fraction of 35% which is reduced from previous EF of 45% per echo in January 2020. He completed a Lexiscan stress test on 6.30.22 that was abnormal revealing  moderate to severe intensity, moderate to large sized, reversible perfusion abnormality that is consistent with ischemia in the basal to apical lateral apical wall(s) in the typical distribution of the LCX territory and a small to moderate sized, moderate intensity, reversible perfusion abnormality that is consistent with ischemia in the basal to mid anterior wall(s) in the typical distribution of the LAD territory.  He underwent a subsequent LHC  on 7.29.22  that revealed normal coronary arteries.  The patient was initiated on GDMT and repeat echocardiogram was completed on 1.30.23. It revealed ejection fraction 40% which is improved from previous EF of 35% per echocardiogram on 5.27.22.  It also noted mild-to-moderate pulmonary hypertension. (See report below).     Today the patient reports that he feels good.  He denies any cardiac complaints of chest pain, shortness of breath, orthopnea, PND, peripheral edema, palpitations or syncope.  He states that he is able to fish and perform yard work without experiencing any chest pain or shortness of breath.  He reports compliance with his current medications.  The patient does endorse that he has consumed crawfish  lately but will monitor his sodium intake.  Of note, the patient endorses a history of witnessed apneic periods and witnessed snoring.    PMH: HTN, HLD, h/o persistent AF/AFL, and h/o NICMP  PSH: ablation  Social History: Denies any illicit drug, tobacco use-smokes occasional cigar . Reports occasional ETOH use  Family History: Father -leaky valve    Previous Diagnostics:   ECHO 1.30.23  The left ventricle is normal in size with mildly decreased systolic function.  Normal right ventricular size with normal right ventricular systolic function.  The estimated ejection fraction is 40%.  Moderate right atrial enlargement.  Mild mitral regurgitation.  Atrial fibrillation observed.  There is mild to moderate pulmonary hypertension.  Moderate left atrial enlargement.    Cardiac catheterization 7.29.22    Conclusion  · The pre-procedure left ventricular end diastolic pressure was 22.  · The estimated blood loss was none.  · The coronary arteries were normal..    FINDINGS  The patient has No CAD  Blood loss:  less than 15 cc.    RECOMMENDATIONS  Medical management -- maximize GDMT  Risk factor modifications -- smoking cessation  Activity -- avoid straining with affected limb for one week  Exercise on regular basis.    Lexiscan stress test 6.20.22       Abnormal myocardial perfusion scan.    There are two significant perfusion abnormalities.    Perfusion Abnormality #1 - There is a moderate to severe intensity, moderate to large sized, reversible perfusion abnormality that is consistent with ischemia in the basal to apical lateral apical wall(s) in the typical distribution of the LCX territory.    Perfusion Abnormality #2 - There is a small to moderate sized, moderate intensity, reversible perfusion abnormality that is consistent with ischemia in the basal to mid anterior wall(s) in the typical distribution of the LAD territory.    There are no other significant perfusion abnormalities.    The gated perfusion images showed an  ejection fraction of 36% at rest. The gated perfusion images showed an ejection fraction of 33% post stress.    The EKG portion of this study is abnormal but not diagnostic.    The patient reported no chest pain during the stress test.    There were no arrhythmias during stress.     If clinically indicated, consider further evaluation with coronary angiogram.            ECHO 5.27.22     The left ventricle is normal in size with moderately decreased systolic function.  The estimated ejection fraction is 35%.  Atrial fibrillation observed.  Mild mitral regurgitation.  Mild tricuspid regurgitation.  IVC is dilated.IVC collapses >50%.  Intermediate central venous pressure (8 mmHg).  The estimated PA systolic pressure is 37 mmHg.  Normal right ventricular size with normal right ventricular systolic function.  There is no pulmonary hypertension.       TTE 1.24.20    1. The study quality is below average.  2. The left ventricle is normal in size. Global left ventricular systolic function is mildly decreased. The left ventricular ejection fraction is 45%.  3. The left atrial diameter is mildly increased. The right atrium is mildly enlarged.  4. Mild (1+) mitral regurgitation. Mild (1+) tricuspid regurgitation.  5. The pulmonary artery systolic pressure is 35 mmHg.     PET 12.6.18   This is a normal perfusion study, no perfusion defects noted.   This scan is suggestive of low risk for future cardiovascular events.   The left ventricular cavity is noted to be moderately enlarged on the stress studies. The stress left ventricular ejection fraction was calculated to be 45% and left ventricular global function is mildly reduced. The rest left ventricular cavity is noted to be mildly enlarged. The rest left ventricular ejection fraction was calculated to be 39% and rest left ventricular global function is moderately reduced.   When compared to the resting ejection fraction (39%), the stress ejection fraction (45%) has  increased.   The study quality is good.    Carotid US 2.9.18  1. The study quality is below average.  2. 1-39% stenosis in the proximal right internal carotid artery based on Bluth Criteria.  3. Antegrade right vertebral artery flow.  4. Antegrade left vertebral artery flow    [facility] 1.3.17  EF -20%  Normal coronaries                                                                                                                                                                                                                                                                                                                                                                                                                                                                                                                                 Patient Active Problem List   Diagnosis    Atrial flutter    Hyperlipidemia    Hypertension    Diabetes mellitus    Tobacco user    Body mass index (BMI) of 45.0 to 49.9 in adult    Subclinical hyperthyroidism    Other male erectile dysfunction    Acute systolic heart failure    AMD (age-related macular degeneration), bilateral    Stage 3a chronic kidney disease     Past Surgical History:   Procedure Laterality Date    ANGIOGRAM, CORONARY, WITH LEFT HEART CATHETERIZATION N/A 7/29/2022    Procedure: Angiogram, Coronary, with Left Heart Cath;  Surgeon: Yossi Zuniga MD;  Location: [facility] CATH LAB;  Service: Cardiology;  Laterality: N/A;    Cardioversion x 2      Comprehensive electrophysiologic eval. including insertion & repositioning of multiple electrode catheters with induction or attempted induction of arrhythmias with left atrial pacing & recordingfrom coronary sinus or left atrium  07/10/2018    comprehensive electrophysiologic eval. insertion & repositioning multiple electrode catheters with induction or attempted induction of an arrhythmia with right atrial pacing and recording, right  ventricular pacing and recording  07/10/2018    Destruction of Conduction Mechanism, Percutaneous Approach  07/10/2018     Social History     Socioeconomic History    Marital status: Single   Occupational History    Occupation: retired   Tobacco Use    Smoking status: Some Days     Types: Cigars    Smokeless tobacco: Never    Tobacco comments:     Occasionally, last smoked 12/2022   Substance and Sexual Activity    Alcohol use: Yes     Comment: Drinks occasionally beer on weekends    Drug use: Never    Sexual activity: Yes     Partners: Female     Social Determinants of Health     Transportation Needs: Unmet Transportation Needs    Lack of Transportation (Medical): Yes    Lack of Transportation (Non-Medical): No   Social Connections: Moderately Isolated    Frequency of Communication with Friends and Family: More than three times a week    Frequency of Social Gatherings with Friends and Family: More than three times a week    Attends Worship Services: More than 4 times per year    Active Member of Clubs or Organizations: No    Attends Club or Organization Meetings: Never    Marital Status: Never    Housing Stability: Low Risk     Unable to Pay for Housing in the Last Year: No    Number of Places Lived in the Last Year: 1    Unstable Housing in the Last Year: No        Family History   Problem Relation Age of Onset    Hypertension Mother     Valvular heart disease Father          Current Outpatient Medications:     amiodarone (PACERONE) 200 MG Tab, Take 1 tablet (200 mg total) by mouth once daily., Disp: 90 tablet, Rfl: 3    furosemide (LASIX) 40 MG tablet, Take 1 tablet (40 mg total) by mouth 2 (two) times daily., Disp: 180 tablet, Rfl: 3    hydrALAZINE (APRESOLINE) 50 MG tablet, Take 1 tablet (50 mg total) by mouth 2 (two) times daily., Disp: 180 tablet, Rfl: 3    KLOR-CON M20 20 mEq tablet, Take 1 tablet (20 mEq total) by mouth once daily., Disp: 90 tablet, Rfl: 3    metFORMIN (GLUCOPHAGE-XR) 500 MG ER  24hr tablet, Take 1 tablet (500 mg total) by mouth once daily., Disp: 90 tablet, Rfl: 3    metoprolol succinate (TOPROL-XL) 50 MG 24 hr tablet, Take 1 tablet (50 mg total) by mouth once daily., Disp: 90 tablet, Rfl: 3    pravastatin (PRAVACHOL) 40 MG tablet, Take 1 tablet (40 mg total) by mouth every evening., Disp: 90 tablet, Rfl: 3    sacubitriL-valsartan (ENTRESTO) 49-51 mg per tablet, Take 1 tablet by mouth 2 (two) times daily., Disp: 180 tablet, Rfl: 3    XARELTO 20 mg Tab, Take 1 tablet (20 mg total) by mouth once daily., Disp: 90 tablet, Rfl: 3    blood sugar diagnostic Strp, Inject 1 strip into the skin 2 (two) times a day., Disp: 100 strip, Rfl: 11    blood-glucose meter kit,  True Metrix Glucometer, See Instructions, Use to check blood glucose daily, # 1 EA, 0 Refill(s), Pharmacy: [facility], 177.8, cm, Height/Length Dosing, 01/12/22 9:55:00 CST, 148.4, kg, Weight Dosing, 01/12/22 9:55:00 CST, Disp: , Rfl:     EASY TOUCH TWIST LANCETS 33 gauge Misc, USE AS DIRECTED TO CHECK BLOOD GLUCOSE DAILY, Disp: , Rfl:     LANCETS MISC,  True Metrix Lancets, See Instructions, Use to check blood glucose daily, # 100 EA, 11 Refill(s), Pharmacy: [facility], 177.8, cm, Height/Length Dosing, 01/12/22 9:55:00 CST, 148.4, kg, Weight Dosing, 01/12/22 9:55:00 CST, Disp: , Rfl:      ROS:                                                                                                                                                                             Review of Systems   Constitutional: Negative.    HENT: Negative.     Eyes: Negative.    Respiratory:  Positive for shortness of breath.    Cardiovascular: Negative.    Gastrointestinal: Negative.    Genitourinary: Negative.    Musculoskeletal: Negative.    Skin: Negative.    Neurological: Negative.    Endo/Heme/Allergies: Negative.    Psychiatric/Behavioral: Negative.        Blood pressure 131/88, pulse 72, temperature 97.8 °F (36.6  "°C), temperature source Oral, resp. rate 20, height 5' 10" (1.778 m), weight (!) 143.1 kg (315 lb 7.7 oz), SpO2 97 %.   PE:  Physical Exam  Constitutional:       Appearance: Normal appearance.   HENT:      Head: Normocephalic.   Eyes:      Pupils: Pupils are equal, round, and reactive to light.   Cardiovascular:      Rate and Rhythm: Normal rate and regular rhythm.      Pulses: Normal pulses.   Pulmonary:      Effort: Pulmonary effort is normal.   Musculoskeletal:         General: Normal range of motion.   Skin:     General: Skin is warm and dry.   Neurological:      General: No focal deficit present.      Mental Status: He is alert and oriented to person, place, and time.   Psychiatric:         Mood and Affect: Mood normal.         Behavior: Behavior normal.              ASSESSMENT/PLAN:  NICMP/HFrEF  - LVEF - 40% per ECHO 1.30.23 improved from -->35% per TTE 5.27.22 --> 45% per TTE (1.24.20)  - Denies CP, SOB or JOSHI  - Euvolemic upon exam, warm and dry   - s/p LHC- normal coronaries (7.29.22)  - Lexiscan  stress test - to reversible abnormalities ischemic defects (6.20.22)  - LHC- EF-20%, Normal coronaries (1.3.17)  - Continue GDMT: Toprol succinate 50 daily, Entresto- 49/51 mg BID   - Continue Lasix 40 as directed  - Counseled patient on low-sodium diet     H/o persistent AF/AFL Status post RFA on (7.10.18)   - Denies palpitations  - Has multiple attempted DCCVs but has had recurrence of AFL  - Continue with amiodarone 200 mg p.o. daily and Toprol succinate 50qd  - Latest CXR for Amio monitoring -stable, TSH normal  - Continue Xarelto 20 mg p.o. daily. Denies any adverse bleeding issues  - Recommended sleep study. Patient also has mild to moderate pulmonary HTN on recent ECHO. Reports history of witnessed apnea and snoring.    - EKG today - atrial Fibrillation- HR -86.  Patient remains in atrial fibrillation.  Will discontinue amiodarone.  Will schedule a nurse visit in 3-4 weeks for blood pressure and heart rate " check.  If patient becomes tachycardic may need to up titrate beta-blocker to rate control.      HTN- BP at goal   - Continue current medications   - Counseled on low-sodium diet     Diabetes  - A1C- 5.9  - Continue management per PCP     HLD- LDL -103- improved   - Continue Pravastatin 40 mg po daily.  - Counseled patient on low-cholesterol, low-fat diet, and encourage exercise as tolerated  - Repeat lipid panel prior to next visit     Carotid Artery disease  - 1-39% stenosis in the proximal right internal carotid artery based on Bluth Criteria. (2018)  - Denies any dizziness, syncope or focal weakness    Tobacco use  - Reports occasional cigar use. Reports last use Dec. 2022  - Counseled on importance of smoking cessation      EKG   Sleep Study Referral   Discontinue amiodarone  Nurse visit in 4 weeks for BP/HR check  Follow-up in cardiology clinic in 4 months with [facility] or sooner if needed   Follow up with PCP as directed

## 2023-03-14 ENCOUNTER — OFFICE VISIT (OUTPATIENT)
Dept: CARDIOLOGY | Facility: CLINIC | Age: 63
End: 2023-03-14
Payer: COMMERCIAL

## 2023-03-14 VITALS
OXYGEN SATURATION: 97 % | WEIGHT: 315 LBS | RESPIRATION RATE: 20 BRPM | HEART RATE: 72 BPM | SYSTOLIC BLOOD PRESSURE: 131 MMHG | BODY MASS INDEX: 45.1 KG/M2 | HEIGHT: 70 IN | DIASTOLIC BLOOD PRESSURE: 88 MMHG | TEMPERATURE: 98 F

## 2023-03-14 DIAGNOSIS — I48.92 ATRIAL FLUTTER, UNSPECIFIED TYPE: ICD-10-CM

## 2023-03-14 DIAGNOSIS — I42.8 NON-ISCHEMIC CARDIOMYOPATHY: ICD-10-CM

## 2023-03-14 DIAGNOSIS — I48.0 PAROXYSMAL ATRIAL FIBRILLATION: ICD-10-CM

## 2023-03-14 DIAGNOSIS — R06.09 DOE (DYSPNEA ON EXERTION): ICD-10-CM

## 2023-03-14 DIAGNOSIS — I10 PRIMARY HYPERTENSION: Primary | ICD-10-CM

## 2023-03-14 DIAGNOSIS — Z72.0 TOBACCO USER: ICD-10-CM

## 2023-03-14 DIAGNOSIS — E78.2 MIXED HYPERLIPIDEMIA: ICD-10-CM

## 2023-03-14 PROCEDURE — 1159F PR MEDICATION LIST DOCUMENTED IN MEDICAL RECORD: ICD-10-PCS | Mod: CPTII,,, | Performed by: NURSE PRACTITIONER

## 2023-03-14 PROCEDURE — 4010F ACE/ARB THERAPY RXD/TAKEN: CPT | Mod: CPTII,,, | Performed by: NURSE PRACTITIONER

## 2023-03-14 PROCEDURE — 4010F PR ACE/ARB THEARPY RXD/TAKEN: ICD-10-PCS | Mod: CPTII,,, | Performed by: NURSE PRACTITIONER

## 2023-03-14 PROCEDURE — 93005 ELECTROCARDIOGRAM TRACING: CPT

## 2023-03-14 PROCEDURE — 3079F DIAST BP 80-89 MM HG: CPT | Mod: CPTII,,, | Performed by: NURSE PRACTITIONER

## 2023-03-14 PROCEDURE — 99215 OFFICE O/P EST HI 40 MIN: CPT | Mod: PBBFAC | Performed by: NURSE PRACTITIONER

## 2023-03-14 PROCEDURE — 99214 OFFICE O/P EST MOD 30 MIN: CPT | Mod: S$PBB,,, | Performed by: NURSE PRACTITIONER

## 2023-03-14 PROCEDURE — 1159F MED LIST DOCD IN RCRD: CPT | Mod: CPTII,,, | Performed by: NURSE PRACTITIONER

## 2023-03-14 PROCEDURE — 3075F SYST BP GE 130 - 139MM HG: CPT | Mod: CPTII,,, | Performed by: NURSE PRACTITIONER

## 2023-03-14 PROCEDURE — 3079F PR MOST RECENT DIASTOLIC BLOOD PRESSURE 80-89 MM HG: ICD-10-PCS | Mod: CPTII,,, | Performed by: NURSE PRACTITIONER

## 2023-03-14 PROCEDURE — 3008F PR BODY MASS INDEX (BMI) DOCUMENTED: ICD-10-PCS | Mod: CPTII,,, | Performed by: NURSE PRACTITIONER

## 2023-03-14 PROCEDURE — 99214 PR OFFICE/OUTPT VISIT, EST, LEVL IV, 30-39 MIN: ICD-10-PCS | Mod: S$PBB,,, | Performed by: NURSE PRACTITIONER

## 2023-03-14 PROCEDURE — 3008F BODY MASS INDEX DOCD: CPT | Mod: CPTII,,, | Performed by: NURSE PRACTITIONER

## 2023-03-14 PROCEDURE — 3075F PR MOST RECENT SYSTOLIC BLOOD PRESS GE 130-139MM HG: ICD-10-PCS | Mod: CPTII,,, | Performed by: NURSE PRACTITIONER

## 2023-03-14 RX ORDER — HYDRALAZINE HYDROCHLORIDE 50 MG/1
50 TABLET, FILM COATED ORAL 2 TIMES DAILY
Qty: 180 TABLET | Refills: 3 | Status: SHIPPED | OUTPATIENT
Start: 2023-03-14 | End: 2024-04-02 | Stop reason: SDUPTHER

## 2023-03-14 RX ORDER — POTASSIUM CHLORIDE 1500 MG/1
20 TABLET, EXTENDED RELEASE ORAL DAILY
Qty: 90 TABLET | Refills: 3 | Status: SHIPPED | OUTPATIENT
Start: 2023-03-14 | End: 2024-03-13

## 2023-03-14 RX ORDER — PRAVASTATIN SODIUM 40 MG/1
40 TABLET ORAL NIGHTLY
Qty: 90 TABLET | Refills: 3 | Status: SHIPPED | OUTPATIENT
Start: 2023-03-14 | End: 2024-04-02 | Stop reason: SDUPTHER

## 2023-03-14 RX ORDER — FUROSEMIDE 40 MG/1
40 TABLET ORAL 2 TIMES DAILY
Qty: 180 TABLET | Refills: 3 | Status: SHIPPED | OUTPATIENT
Start: 2023-03-14 | End: 2024-04-02 | Stop reason: SDUPTHER

## 2023-03-14 RX ORDER — METOPROLOL SUCCINATE 50 MG/1
50 TABLET, EXTENDED RELEASE ORAL DAILY
Qty: 90 TABLET | Refills: 3 | Status: SHIPPED | OUTPATIENT
Start: 2023-03-14 | End: 2024-04-02 | Stop reason: SDUPTHER

## 2023-03-14 NOTE — PATIENT INSTRUCTIONS
EKG   Sleep Study Referral   Follow-up in cardiology clinic in 4 months with [facility] or sooner if needed   Follow up with PCP as directed

## 2023-05-11 DIAGNOSIS — I48.0 PAROXYSMAL ATRIAL FIBRILLATION: Primary | ICD-10-CM

## 2023-05-24 DIAGNOSIS — G47.33 OSA (OBSTRUCTIVE SLEEP APNEA): Primary | ICD-10-CM

## 2023-06-22 ENCOUNTER — PROCEDURE VISIT (OUTPATIENT)
Dept: SLEEP MEDICINE | Facility: HOSPITAL | Age: 63
End: 2023-06-22
Attending: NURSE PRACTITIONER
Payer: COMMERCIAL

## 2023-06-22 DIAGNOSIS — G47.33 OSA (OBSTRUCTIVE SLEEP APNEA): ICD-10-CM

## 2023-06-22 PROCEDURE — 95810 POLYSOM 6/> YRS 4/> PARAM: CPT

## 2023-07-05 DIAGNOSIS — G47.33 OSA ON CPAP: Primary | ICD-10-CM

## 2023-07-26 ENCOUNTER — OFFICE VISIT (OUTPATIENT)
Dept: INTERNAL MEDICINE | Facility: CLINIC | Age: 63
End: 2023-07-26
Payer: COMMERCIAL

## 2023-07-26 ENCOUNTER — HOSPITAL ENCOUNTER (OUTPATIENT)
Dept: RADIOLOGY | Facility: HOSPITAL | Age: 63
Discharge: HOME | End: 2023-07-26
Attending: NURSE PRACTITIONER
Payer: COMMERCIAL

## 2023-07-26 VITALS
HEART RATE: 90 BPM | HEIGHT: 70 IN | TEMPERATURE: 98 F | RESPIRATION RATE: 18 BRPM | WEIGHT: 315 LBS | BODY MASS INDEX: 45.1 KG/M2 | DIASTOLIC BLOOD PRESSURE: 82 MMHG | SYSTOLIC BLOOD PRESSURE: 136 MMHG

## 2023-07-26 DIAGNOSIS — G89.29 CHRONIC PAIN OF BOTH KNEES: ICD-10-CM

## 2023-07-26 DIAGNOSIS — Z72.0 TOBACCO USER: Chronic | ICD-10-CM

## 2023-07-26 DIAGNOSIS — M25.562 CHRONIC PAIN OF BOTH KNEES: ICD-10-CM

## 2023-07-26 DIAGNOSIS — Z12.11 SCREENING FOR MALIGNANT NEOPLASM OF COLON: ICD-10-CM

## 2023-07-26 DIAGNOSIS — M25.561 CHRONIC PAIN OF BOTH KNEES: Chronic | ICD-10-CM

## 2023-07-26 DIAGNOSIS — N18.31 STAGE 3A CHRONIC KIDNEY DISEASE: Chronic | ICD-10-CM

## 2023-07-26 DIAGNOSIS — E11.39 TYPE 2 DIABETES MELLITUS WITH OTHER OPHTHALMIC COMPLICATION, WITHOUT LONG-TERM CURRENT USE OF INSULIN: Chronic | ICD-10-CM

## 2023-07-26 DIAGNOSIS — Z11.3 ROUTINE SCREENING FOR STI (SEXUALLY TRANSMITTED INFECTION): Primary | ICD-10-CM

## 2023-07-26 DIAGNOSIS — N52.8 OTHER MALE ERECTILE DYSFUNCTION: Chronic | ICD-10-CM

## 2023-07-26 DIAGNOSIS — R53.83 FATIGUE, UNSPECIFIED TYPE: ICD-10-CM

## 2023-07-26 DIAGNOSIS — E78.2 MIXED HYPERLIPIDEMIA: Chronic | ICD-10-CM

## 2023-07-26 DIAGNOSIS — G89.29 CHRONIC PAIN OF BOTH KNEES: Chronic | ICD-10-CM

## 2023-07-26 DIAGNOSIS — I10 PRIMARY HYPERTENSION: Chronic | ICD-10-CM

## 2023-07-26 DIAGNOSIS — M17.0 BILATERAL PRIMARY OSTEOARTHRITIS OF KNEE: ICD-10-CM

## 2023-07-26 DIAGNOSIS — M25.562 CHRONIC PAIN OF BOTH KNEES: Chronic | ICD-10-CM

## 2023-07-26 DIAGNOSIS — M25.561 CHRONIC PAIN OF BOTH KNEES: ICD-10-CM

## 2023-07-26 DIAGNOSIS — R80.1 PERSISTENT PROTEINURIA: Chronic | ICD-10-CM

## 2023-07-26 PROBLEM — I48.92 ATRIAL FLUTTER: Chronic | Status: ACTIVE | Noted: 2022-05-12

## 2023-07-26 PROBLEM — I50.21 ACUTE SYSTOLIC HEART FAILURE: Chronic | Status: ACTIVE | Noted: 2022-06-09

## 2023-07-26 PROBLEM — E11.9 DIABETES MELLITUS: Chronic | Status: ACTIVE | Noted: 2022-05-12

## 2023-07-26 PROBLEM — E78.5 HYPERLIPIDEMIA: Chronic | Status: ACTIVE | Noted: 2022-05-12

## 2023-07-26 LAB
DEPRECATED CALCIDIOL+CALCIFEROL SERPL-MC: 31.1 NG/ML (ref 30–80)
HAV IGM SERPL QL IA: NONREACTIVE
HBV CORE IGM SERPL QL IA: NONREACTIVE
HBV SURFACE AG SERPL QL IA: NONREACTIVE
HCV AB SERPL QL IA: NONREACTIVE
HIV 1+2 AB+HIV1 P24 AG SERPL QL IA: NONREACTIVE

## 2023-07-26 PROCEDURE — 99406 PR TOBACCO USE CESSATION INTERMEDIATE 3-10 MINUTES: ICD-10-PCS | Mod: S$PBB,,, | Performed by: NURSE PRACTITIONER

## 2023-07-26 PROCEDURE — 82306 VITAMIN D 25 HYDROXY: CPT

## 2023-07-26 PROCEDURE — 3066F PR DOCUMENTATION OF TREATMENT FOR NEPHROPATHY: ICD-10-PCS | Mod: CPTII,,, | Performed by: NURSE PRACTITIONER

## 2023-07-26 PROCEDURE — 1159F MED LIST DOCD IN RCRD: CPT | Mod: CPTII,,, | Performed by: NURSE PRACTITIONER

## 2023-07-26 PROCEDURE — 99215 OFFICE O/P EST HI 40 MIN: CPT | Mod: PBBFAC | Performed by: NURSE PRACTITIONER

## 2023-07-26 PROCEDURE — 99406 BEHAV CHNG SMOKING 3-10 MIN: CPT | Mod: S$PBB,,, | Performed by: NURSE PRACTITIONER

## 2023-07-26 PROCEDURE — 3044F PR MOST RECENT HEMOGLOBIN A1C LEVEL <7.0%: ICD-10-PCS | Mod: CPTII,,, | Performed by: NURSE PRACTITIONER

## 2023-07-26 PROCEDURE — 3075F PR MOST RECENT SYSTOLIC BLOOD PRESS GE 130-139MM HG: ICD-10-PCS | Mod: CPTII,,, | Performed by: NURSE PRACTITIONER

## 2023-07-26 PROCEDURE — 3008F BODY MASS INDEX DOCD: CPT | Mod: CPTII,,, | Performed by: NURSE PRACTITIONER

## 2023-07-26 PROCEDURE — 1159F PR MEDICATION LIST DOCUMENTED IN MEDICAL RECORD: ICD-10-PCS | Mod: CPTII,,, | Performed by: NURSE PRACTITIONER

## 2023-07-26 PROCEDURE — 3062F POS MACROALBUMINURIA REV: CPT | Mod: CPTII,,, | Performed by: NURSE PRACTITIONER

## 2023-07-26 PROCEDURE — 3044F HG A1C LEVEL LT 7.0%: CPT | Mod: CPTII,,, | Performed by: NURSE PRACTITIONER

## 2023-07-26 PROCEDURE — 4010F PR ACE/ARB THEARPY RXD/TAKEN: ICD-10-PCS | Mod: CPTII,,, | Performed by: NURSE PRACTITIONER

## 2023-07-26 PROCEDURE — 1160F PR REVIEW ALL MEDS BY PRESCRIBER/CLIN PHARMACIST DOCUMENTED: ICD-10-PCS | Mod: CPTII,,, | Performed by: NURSE PRACTITIONER

## 2023-07-26 PROCEDURE — 73562 X-RAY EXAM OF KNEE 3: CPT | Mod: TC,50

## 2023-07-26 PROCEDURE — 80074 ACUTE HEPATITIS PANEL: CPT | Performed by: NURSE PRACTITIONER

## 2023-07-26 PROCEDURE — 99214 OFFICE O/P EST MOD 30 MIN: CPT | Mod: 25,S$PBB,, | Performed by: NURSE PRACTITIONER

## 2023-07-26 PROCEDURE — 87389 HIV-1 AG W/HIV-1&-2 AB AG IA: CPT | Performed by: NURSE PRACTITIONER

## 2023-07-26 PROCEDURE — 3066F NEPHROPATHY DOC TX: CPT | Mod: CPTII,,, | Performed by: NURSE PRACTITIONER

## 2023-07-26 PROCEDURE — 3079F PR MOST RECENT DIASTOLIC BLOOD PRESSURE 80-89 MM HG: ICD-10-PCS | Mod: CPTII,,, | Performed by: NURSE PRACTITIONER

## 2023-07-26 PROCEDURE — 3008F PR BODY MASS INDEX (BMI) DOCUMENTED: ICD-10-PCS | Mod: CPTII,,, | Performed by: NURSE PRACTITIONER

## 2023-07-26 PROCEDURE — 4010F ACE/ARB THERAPY RXD/TAKEN: CPT | Mod: CPTII,,, | Performed by: NURSE PRACTITIONER

## 2023-07-26 PROCEDURE — 1160F RVW MEDS BY RX/DR IN RCRD: CPT | Mod: CPTII,,, | Performed by: NURSE PRACTITIONER

## 2023-07-26 PROCEDURE — 3079F DIAST BP 80-89 MM HG: CPT | Mod: CPTII,,, | Performed by: NURSE PRACTITIONER

## 2023-07-26 PROCEDURE — 99214 PR OFFICE/OUTPT VISIT, EST, LEVL IV, 30-39 MIN: ICD-10-PCS | Mod: 25,S$PBB,, | Performed by: NURSE PRACTITIONER

## 2023-07-26 PROCEDURE — 3062F PR POS MACROALBUMINURIA RESULT DOCUMENTED/REVIEW: ICD-10-PCS | Mod: CPTII,,, | Performed by: NURSE PRACTITIONER

## 2023-07-26 PROCEDURE — 3075F SYST BP GE 130 - 139MM HG: CPT | Mod: CPTII,,, | Performed by: NURSE PRACTITIONER

## 2023-07-26 NOTE — PROGRESS NOTES
Geetha Pickens, MARIBELL   [facility]  [address] [facility] [address]      PATIENT NAME: Jamey Duke  : 1960  DATE: 23  MRN: [id number]      Reason for Visit / Chief Complaint: Follow-up (Lab results, refused vaccines)       History of Present Illness / Problem Focused Workflow     Jamey Duke is a 62 y.o. Black or  male presents to the clinic for DM f/u. PMH DM, HTN, HLD, h/o persistent AF/AFL, DEEP, and h/o NICMP w/multiple DCCVs -AFL recurred, macular degeneration, and ED. Followed by [facility] cardio clinic. Has optho visit in [place] on 23 (will sign release when at visit for records).    Today, pt reports continued med compliance. Has completed sleep study, dx'd with SHADI and has titration scheduled later this week.  Continues to attempt ADA/low sodium diet. CBGs ranging 70-90s qam. States his stamina is down and gets tired quickly; cannot complete tasks as he could in the past. Has resumed smoking cigarettes and cigars occasionally. Labs reviewed with pt. Declines vaccines today. Denies chest pain, shortness of breath, cough, fever, headache, dizziness, weakness, abdominal pain, nausea, vomiting, diarrhea, constipation, dysuria, depression, anxiety, SI/HI.      Review of Systems     Review of Systems     See HPI for details    Constitutional: Denies Change in appetite. Denies Chills. Denies Fever. Denies Night sweats.  Eye: Denies Blurred vision. Denies Discharge. Denies Eye pain.  ENT: Denies Decreased hearing. Denies Sore throat. Denies Swollen glands.  Respiratory: Denies Cough. Denies Shortness of breath. Denies Shortness of breath with exertion. Denies Wheezing.  Cardiovascular: Denies Chest pain at rest. Denies Chest pain with exertion. Denies Irregular heartbeat. Denies Palpitations. Denies Edema.  Gastrointestinal: Denies Abdominal pain. Denies Diarrhea. Denies Nausea. Denies Vomiting. Denies  Hematemesis or Hematochezia.  Genitourinary: Denies Dysuria. Denies Urinary frequency. Denies Urinary urgency. Denies Blood in urine.  Endocrine: Denies Cold intolerance. Denies Excessive thirst. Denies Heat intolerance. Denies Weight loss. Denies Weight gain.  Musculoskeletal: Admits Painful joints. Denies Weakness.  Integumentary: Denies Rash. Denies Itching. Denies Dry skin.  Neurologic: Denies Dizziness. Denies Fainting. Denies Headache.  Psychiatric: Denies Depression. Denies Anxiety. Denies Suicidal/Homicidal ideations.    All Other ROS: Negative except as stated in HPI.     Medical / Surgical / Social / Family History       ----------------------------  Atrial flutter  Diabetes mellitus  Hypercholesteremia  Hypertension  Morbid obesity  Obese  Tobacco user     Past Surgical History:   Procedure Laterality Date    ANGIOGRAM, CORONARY, WITH LEFT HEART CATHETERIZATION N/A 7/29/2022    Procedure: Angiogram, Coronary, with Left Heart Cath;  Surgeon: Yossi Zuniga MD;  Location: [facility] CATH LAB;  Service: Cardiology;  Laterality: N/A;    Cardioversion x 2      Comprehensive electrophysiologic eval. including insertion & repositioning of multiple electrode catheters with induction or attempted induction of arrhythmias with left atrial pacing & recordingfrom coronary sinus or left atrium  07/10/2018    comprehensive electrophysiologic eval. insertion & repositioning multiple electrode catheters with induction or attempted induction of an arrhythmia with right atrial pacing and recording, right ventricular pacing and recording  07/10/2018    Destruction of Conduction Mechanism, Percutaneous Approach  07/10/2018       Social History     Socioeconomic History    Marital status: Single   Occupational History    Occupation: retired   Tobacco Use    Smoking status: Some Days     Types: Cigars    Smokeless tobacco: Never    Tobacco comments:     Occasionally, last smoked 7/22/23   Substance and Sexual Activity    Alcohol  use: Yes     Comment: Drinks occasionally beer on weekends    Drug use: Never    Sexual activity: Yes     Partners: Female     Social Determinants of Health     Transportation Needs: Unmet Transportation Needs    Lack of Transportation (Medical): Yes    Lack of Transportation (Non-Medical): No   Social Connections: Moderately Isolated    Frequency of Communication with Friends and Family: More than three times a week    Frequency of Social Gatherings with Friends and Family: More than three times a week    Attends [employer] Services: More than 4 times per year    Active Member of Clubs or Organizations: No    Attends Club or Organization Meetings: Never    Marital Status: Never    Housing Stability: Low Risk     Unable to Pay for Housing in the Last Year: No    Number of Places Lived in the Last Year: 1    Unstable Housing in the Last Year: No        Family History   Problem Relation Age of Onset    Hypertension Mother     Valvular heart disease Father         Medications and Allergies     Medications  Current Outpatient Medications   Medication Instructions    blood sugar diagnostic Strp 1 strip, Subcutaneous, 2 times daily    blood-glucose meter kit   True Metrix Glucometer, See Instructions, Use to check blood glucose daily, # 1 EA, 0 Refill(s), Pharmacy: [facility], 177.8, cm, Height/Length Dosing, 01/12/22 9:55:00 CST, 148.4, kg, Weight Dosing, 01/12/22 9:55:00 CST    EASY TOUCH TWIST LANCETS 33 gauge Misc USE AS DIRECTED TO CHECK BLOOD GLUCOSE DAILY    furosemide (LASIX) 40 mg, Oral, 2 times daily    hydrALAZINE (APRESOLINE) 50 mg, Oral, 2 times daily    KLOR-CON M20 20 mEq tablet 20 mEq, Oral, Daily    LANCETS MISC   True Metrix Lancets, See Instructions, Use to check blood glucose daily, # 100 EA, 11 Refill(s), Pharmacy: [facility], 177.8, cm, Height/Length Dosing, 01/12/22 9:55:00 CST, 148.4, kg, Weight Dosing, 01/12/22 9:55:00 CST    metFORMIN  "(GLUCOPHAGE-XR) 500 mg, Oral, Daily    metoprolol succinate (TOPROL-XL) 50 mg, Oral, Daily    pravastatin (PRAVACHOL) 40 mg, Oral, Nightly    sacubitriL-valsartan (ENTRESTO) 49-51 mg per tablet 1 tablet, Oral, 2 times daily    XARELTO 20 mg, Oral, Daily         Allergies  Review of patient's allergies indicates:  No Known Allergies    Physical Examination     /82 (BP Location: Right arm, Patient Position: Sitting, BP Method: Large (Manual))   Pulse 90   Temp 98 °F (36.7 °C) (Oral)   Resp 18   Ht 5' 10" (1.778 m)   Wt (!) 143.8 kg (317 lb)   BMI 45.48 kg/m²     Physical Exam  Constitutional:       Appearance: He is morbidly obese.   Cardiovascular:      Rate and Rhythm: Rhythm irregularly irregular.   Musculoskeletal:      Right knee: Swelling and crepitus present. Decreased range of motion. Tenderness present over the medial joint line and lateral joint line.      Left knee: Swelling and crepitus present. Decreased range of motion. Tenderness present over the medial joint line and lateral joint line.       General: Alert and oriented, No acute distress.  Head: Normocephalic, Atraumatic.  Eye: Pupils are equal, round and reactive to light, Extraocular movements are intact, Sclera non-icteric.  Ears/Nose/Throat: Normal, Mucosa moist,Clear.  Neck/Thyroid: Supple, Non-tender, No carotid bruit, No lymphadenopathy, No JVD, Full range of motion.  Respiratory: Clear to auscultation bilaterally; No wheezes, rales or rhonchi,Non-labored respirations, Symmetrical chest wall expansion.  Cardiovascular: S1/S2 normal, No murmurs, rubs or gallops.  Gastrointestinal: Soft, Non-tender, Non-distended, Normal bowel sounds, No palpable organomegaly.  Integumentary: Warm, Dry, Intact, No suspicious lesions or rashes.  Extremities: No clubbing, cyanosis or edema  Neurologic: No focal deficits, Cranial Nerves II-XII are grossly intact, Motor strength normal upper and lower extremities, Sensory exam intact.  Psychiatric: Normal " interaction, Coherent speech, Appropriate affect     Protective Sensation (w/ 10 gram monofilament):  Right: Intact  Left: Intact    Visual Inspection:  Normal -  Bilateral    Pedal Pulses:   Right: Present  Left: Present    Posterior Tibialis Pulses:   Right:Present  Left: Present      Results     Lab Results   Component Value Date    WBC 8.8 01/26/2023    HGB 14.8 01/26/2023    HCT 46.9 01/26/2023     01/26/2023    CHOL 165 07/26/2023    TRIG 131 07/26/2023    ALT 18 07/26/2023    AST 21 07/26/2023     07/26/2023    K 4.1 07/26/2023    CREATININE 1.59 (H) 07/26/2023    BUN 19.2 07/26/2023    CO2 26 07/26/2023    TSH 2.1907 05/12/2022    PSA 0.53 07/26/2023    INR 1.63 (H) 07/26/2022    HGBA1C 5.8 07/26/2023         Assessment and Plan (including Health Maintenance)     Plan:     1. Type 2 diabetes mellitus with other ophthalmic complication, without long-term current use of insulin  A1C 5.8 at goal. Previous A1C 5.9.   Continue medications as prescribed.  Follow ADA diet.  Avoid soda, simple sweets, and limit rice/pasta/bread/starches and consume brown options when possible.   Maintain healthy weight with BMI goal <30.   Perform aerobic exercise for 150 minutes per week (or 5 days a week for 30 minutes each day).   Examine feet daily.   Obtain annual dilated eye exam.  Eye exam: 3/2023 - records requested  Foot exam: 7/26/23      2. Primary hypertension  At goal.  Continue entresto, metoprolol and hydralazine.  Follow a low sodium (less than 2 grams of sodium per day), DASH diet.   Continue medications as prescribed.  Monitor blood pressure and report any consistent values greater than 140/90 and keep a log.  Encouraged smoking cessation to aid in BP reduction and co-morbidities.   Maintain healthy weight with a BMI goal of <30.   Aerobic exercise for 150 minutes per week (or 5 days a week for 30 minutes each day).      3. Mixed hyperlipidemia   / Trig 131 / HDL - 39  / Total chol -  165.  Continue pravastatin.  Follow a low cholesterol, low saturated fat diet with less than 200 mg of cholesterol a day.   Avoid fried foods and high saturated fats (meadows, sausage, cookies, cakes, chips, cheese, whole milk, butter, mayonnaise, creamy dressings, gravy and cream sauces).  Add flax seed or fish oil supplements to diet.   Increase dietary fiber.   Regular exercise improves cholesterol levels.  Physical activity 5 times a week for 30 minutes per day (or 150 minutes per week).   Stressed importance of dietary modifications.      4. Persistent proteinuria  Proteinuria noted, Microalb/creat ratio 782.3.  GFR 48.  Referral to renal to eval/treat.  - Ambulatory referral/consult to Nephrology; Future    5. Stage 3a chronic kidney disease  Proteinuria noted.  GFR 48.  Referral to renal to eval/treat.  Renoprotective measures discussed:  -Follow renal diet (reduce intake of nuts, peanut butter, milk, cheese, dried beans, peas) and Low Sodium Diet (less than 2 grams per day).  -Control high blood pressure ( goal BP < 130/80, please record BP at home every day and bring log to next office visit)  -Exercise at least 30 minutes a day, 5 days a week.  -Maintain healthy weight.  -Decrease or stop alcohol use.  -Do not smoke.  -Stay well hydrated.  -Receive Pneumovax, Flu, and HBV vaccines if indicated.  -Do not take NSAIDs (Ibuprofen, Naproxen, Aleve, Advil, Toradol, Mobic), may take only Tylenol as needed for pain/headaches.  -Take cholesterol-lowering medications as prescribed (LDL goal <100).    - Ambulatory referral/consult to Nephrology; Future    6. Other male erectile dysfunction  Referral to urology to eval/treat.  Keep appt when scheduled.  - Ambulatory referral/consult to Urology; Future    7. Body mass index (BMI) of 45.0 to 49.9 in adult  BMI 45. Goal BMI <30.  Aerobic exercise 150 minutes per week.  Avoid soda, simple sugars, sweets, excessive rice, pasta, potatoes or bread.   Choose brown options when  available and portion control.  Limit fast foods and fried foods.   Choose complex carbs in moderation (ex: green, leafy vegetables, beans, oatmeal).  Eat plenty of fresh fruits and vegetables with lean meats daily.   Consider permanent healthy lifestyle changes.      8. Tobacco user  Smoking cessation discussed; total time 3 mins.   Pt previously quit for several months then resumed.  Declines referral to Smoking Cessation program.  Discussed benefits of quitting including improved health, decreased cardiac/vascular/pulmonary/stroke risks as well as saving money.      9. Routine screening for STI (sexually transmitted infection)  - Hepatitis Panel, Acute; Future  - HIV 1/2 Ag/Ab (4th Gen); Future  - HIV 1/2 Ag/Ab (4th Gen)  - Hepatitis Panel, Acute    10. Screening for malignant neoplasm of colon  - Cologuard Screening (Multitarget Stool DNA); Future  - Cologuard Screening (Multitarget Stool DNA)    11. Bilateral knee pain  XR bilateral knees today.  Will refer to ortho if indicated.  Stop use of NSAIDS/Goody's powder.  Perform knee exercises daily.   Avoid activities than increase knee pain or stiffness.   Apply heating pad, ice pack, and BioFreeze/topical capsaicin as needed; alternate every 15-20 minutes.   Continue tylenol arthritis as needed.      Problem List Items Addressed This Visit          Cardiac/Vascular    Hyperlipidemia (Chronic)    Hypertension (Chronic)       Renal/    Other male erectile dysfunction (Chronic)    Relevant Orders    Ambulatory referral/consult to Urology    Stage 3a chronic kidney disease (Chronic)    Relevant Orders    Ambulatory referral/consult to Nephrology    Persistent proteinuria (Chronic)    Relevant Orders    Ambulatory referral/consult to Nephrology       Endocrine    Diabetes mellitus (Chronic)    Body mass index (BMI) of 45.0 to 49.9 in adult (Chronic)       Orthopedic    Chronic pain of both knees (Chronic)    Relevant Orders    X-Ray Knee 3 View Bilateral       Other     Tobacco user (Chronic)     Other Visit Diagnoses       Routine screening for STI (sexually transmitted infection)    -  Primary    Relevant Orders    Hepatitis Panel, Acute    HIV 1/2 Ag/Ab (4th Gen)    Screening for malignant neoplasm of colon        Relevant Orders    Cologuard Screening (Multitarget Stool DNA)    Fatigue, unspecified type        Relevant Orders    Vitamin D              Health Maintenance Due   Topic Date Due    Hepatitis C Screening  Never done    HIV Screening  Never done    TETANUS VACCINE  Never done    Colorectal Cancer Screening  Never done    Shingles Vaccine (1 of 2) Never done    COVID-19 Vaccine (3 - Moderna series) 06/08/2021    Foot Exam  05/12/2023    Eye Exam  05/12/2023       Follow up in about 6 months (around 1/26/2024) for Follow up with labs one week prior to appt. .        Signature:  MARIBELL Irizarry  [facility]  [address] [facility] [address]

## 2023-07-26 NOTE — PROGRESS NOTES
Inform the pt that he has arthritis in both knees. I have referred him to PT and ortho for further evaluation.   Perform knee exercises daily.   Avoid activities than increase knee pain or stiffness.   Apply heating pad, ice pack, and BioFreeze/topical capsaicin as needed; alternate every 15-20 minutes.   Continue tylenol arthritis as needed.  Thanks

## 2023-07-27 DIAGNOSIS — M17.0 BILATERAL PRIMARY OSTEOARTHRITIS OF KNEE: Primary | ICD-10-CM

## 2023-07-27 LAB — PATH REV: NORMAL

## 2023-07-27 NOTE — PROGRESS NOTES
Contacted patient and notified him of results, instructions, and referrals. He stated he will not want to do PT at the moment but will go to Ortho.

## 2023-07-28 ENCOUNTER — PROCEDURE VISIT (OUTPATIENT)
Dept: SLEEP MEDICINE | Facility: HOSPITAL | Age: 63
End: 2023-07-28
Attending: FAMILY MEDICINE
Payer: COMMERCIAL

## 2023-07-28 DIAGNOSIS — G47.33 OSA ON CPAP: ICD-10-CM

## 2023-07-28 PROCEDURE — 95811 POLYSOM 6/>YRS CPAP 4/> PARM: CPT

## 2023-08-08 ENCOUNTER — TELEPHONE (OUTPATIENT)
Dept: INTERNAL MEDICINE | Facility: CLINIC | Age: 63
End: 2023-08-08
Payer: COMMERCIAL

## 2023-08-08 DIAGNOSIS — M17.0 BILATERAL PRIMARY OSTEOARTHRITIS OF KNEE: Primary | ICD-10-CM

## 2023-08-08 NOTE — TELEPHONE ENCOUNTER
Pt's insurance not accepted at [facility]. Referral has been placed to [facility].

## 2023-08-11 LAB — NONINV COLON CA DNA+OCC BLD SCRN STL QL: POSITIVE

## 2023-08-14 DIAGNOSIS — R19.5 POSITIVE COLORECTAL CANCER SCREENING USING COLOGUARD TEST: Primary | ICD-10-CM

## 2023-08-14 NOTE — PROGRESS NOTES
Inform the pt that his cologuard was positive. Inquire if he has a particular GI doctor that he wants to see or if a referral to [facility] is ok. Let me know his response. Thanks

## 2023-08-16 ENCOUNTER — TELEPHONE (OUTPATIENT)
Dept: INTERNAL MEDICINE | Facility: CLINIC | Age: 63
End: 2023-08-16
Payer: COMMERCIAL

## 2023-08-16 LAB
INR PPP: 1.6
PROTHROMBIN TIME: 18.9 SECONDS (ref 11.4–14)

## 2023-08-16 NOTE — TELEPHONE ENCOUNTER
----- Message from MARIBELL Clifton sent at 8/14/2023  3:25 PM CDT -----  Inform the pt that his cologuard was positive. Inquire if he has a particular GI doctor that he wants to see or if a referral to [facility] is ok. Let me know his response. Thanks

## 2023-08-16 NOTE — TELEPHONE ENCOUNTER
Unable to contact pt via phone, left message on voice mail to return call to clinic regarding cologuard results and referral.

## 2023-08-18 ENCOUNTER — TELEPHONE (OUTPATIENT)
Dept: INTERNAL MEDICINE | Facility: CLINIC | Age: 63
End: 2023-08-18

## 2023-08-18 NOTE — TELEPHONE ENCOUNTER
----- Message from MARIBELL Clifton sent at 8/14/2023  3:25 PM CDT -----  Inform the pt that his cologuard was positive. Inquire if he has a particular GI doctor that he wants to see or if a referral to [facility] is ok. Let me know his response. Thanks

## 2023-08-29 NOTE — TELEPHONE ENCOUNTER
----- Message from Susanna Zamudio sent at 8/8/2023  9:41 AM CDT -----  Regarding: referral denied  Referral to [facility] ortho surgery has been denied due to not accepting [facility] insurance     Diagnosis:   Encounter Diagnoses   Name Primary?   • Neoplastic (malignant) related fatigue Yes   • Invasive ductal carcinoma of breast, right (CMD)      Regimen: Carboplatin + Paclitaxel + Pembrolizumab  Cycle/Day: C3D1  Is this a C1D1 appt?  No    Dr. Gilbert is supervising clinician today.    ECOG:   ECOG [08/29/23 0847]   ECOG Performance Status 1       Review and verified Advanced Directives: No: Patient declined to create/provide document at this time     Verified if patient has state DNR bracelet on: No; Full Code    Nursing Assessment:   A focused nursing assessment addressing the toxicity of chemotherapy was performed and the patient reports the following:    Anxiety/Depression/Insomnia: Anxiety: No, Depression: No and Insomnia: No  Pain: NO    Toxicity Assessment    Auditory/Ear  Assessment: Yes (Within Defined Limits)    Cardiac General  Assessment: Yes (w/ Exceptions to WDL)  Hypertension: Grade 1    Constitutional  Assessment: Yes (w/ Exceptions to WDL)    Dermatology/Skin  Assessment: Yes (Within Defined Limits)    Endocrine  Assessment: Yes (w/ Exceptions to WDL)  Hot Flashes: Grade 1    Gastrointestinal  Assessment: Yes (Within Defined Limits)    Hemorrhage/Bleeding  Assessment: Yes (Within Defined Limits)    Infection  Assessment: Yes (Within Defined Limits)    Lymphatics  Assessment: Yes (Within Defined Limits)    Musculoskeletal  Assessment: Yes (Within Defined Limits)    Neurology  Assessment: Yes (w/ Exceptions to WDL)  Dizziness: Grade 1    Ocular  Assessment: Yes (Within Defined Limits)    Pain  Assessment: Yes (Within Defined Limits)    Pulmonary/Upper Respiratory  Assessment: Yes (Within Defined Limits)    Genitourinary  Assessment: Yes (Within Defined Limits)        Patient confirms receipt of a signed copy of Anti-Cancer Treatment consent and verbalizes understanding of treatment plan: Yes.     Pre-Treatment: Treatment consent signed  Patient has valid pre-authorization  VS completed  Height  and weight verified  BSA independently double checked & verified by two practitioners  Premed orders, including hydration, are verified prior to administration  Treatment parameters verified in patient protocol  Lab results checked - MD notified; Labs WDL  Chemotherapy doses independently doubled checked & verified by two practitioners    Treatment: I have reviewed the following with the patient:  Name of chemo drug, duration and route of infusion, and reportable infusion-related symptoms.  Chemotherapy has not ; double checked & verified by two practitioners  Appearance and physical integrity of drugs meets standard of drug monograph; double checked & verified by two practitioners  Rate set on infusion pump is in alignment with ordered rate; double checked & verified by two practitioners  Blood return confirmed before, during and after treatment administered  Infusion pump used for non-vesicant drugs  Drugs were administered in proper sequencing  Refer to LDA and MAR for line assessment and medication administration    Post Treatment: Treatment tolerated well; no adverse reaction    Transfusion: Not needed    Integrative Medicine: No    Oral Chemotherapy: No    Education: No new instructions needed    Next appointment scheduled:   Future Appointments   Date Time Provider Department Center   20[facility] 11[facility]   2023 12:00 PM [facility] TREATMENT CHAIR [facility]\""   2023 11:15 AM [facility] ACL LAB [facility]   2023 11:30 AM [facility] TREATMENT CHAIR [facility]   9/15/2023  8:30 AM [facility] ACL LAB [facility]   9/15/2023  8:45 AM [facility]4 TREATMENT CHAIR [facility] \[facility]\""   2023  8:45 AM [facility]   2023  9:15 AM Kiersten Gilbert MD [address]       Patient instructed to call the office with any questions or concerns.    Patient Discharged: patient discharged to home per self, ambulatory

## 2023-08-30 ENCOUNTER — TELEPHONE (OUTPATIENT)
Dept: INTERNAL MEDICINE | Facility: CLINIC | Age: 63
End: 2023-08-30
Payer: COMMERCIAL

## 2023-08-30 DIAGNOSIS — R19.5 POSITIVE COLORECTAL CANCER SCREENING USING COLOGUARD TEST: Primary | ICD-10-CM

## 2023-08-30 NOTE — TELEPHONE ENCOUNTER
Pt called requesting that a referral for his gastroenterology  be sent to [facility] in [place] P: [phone].

## 2023-09-05 ENCOUNTER — OFFICE VISIT (OUTPATIENT)
Dept: UROLOGY | Facility: CLINIC | Age: 63
End: 2023-09-05
Payer: COMMERCIAL

## 2023-09-05 VITALS
OXYGEN SATURATION: 97 % | WEIGHT: 315 LBS | BODY MASS INDEX: 45.1 KG/M2 | SYSTOLIC BLOOD PRESSURE: 134 MMHG | TEMPERATURE: 99 F | DIASTOLIC BLOOD PRESSURE: 97 MMHG | HEART RATE: 118 BPM | RESPIRATION RATE: 20 BRPM | HEIGHT: 70 IN

## 2023-09-05 DIAGNOSIS — Z12.5 SCREENING PSA (PROSTATE SPECIFIC ANTIGEN): Primary | ICD-10-CM

## 2023-09-05 DIAGNOSIS — N52.8 OTHER MALE ERECTILE DYSFUNCTION: Chronic | ICD-10-CM

## 2023-09-05 PROCEDURE — 3075F PR MOST RECENT SYSTOLIC BLOOD PRESS GE 130-139MM HG: ICD-10-PCS | Mod: CPTII,,, | Performed by: NURSE PRACTITIONER

## 2023-09-05 PROCEDURE — 1159F PR MEDICATION LIST DOCUMENTED IN MEDICAL RECORD: ICD-10-PCS | Mod: CPTII,,, | Performed by: NURSE PRACTITIONER

## 2023-09-05 PROCEDURE — 3044F PR MOST RECENT HEMOGLOBIN A1C LEVEL <7.0%: ICD-10-PCS | Mod: CPTII,,, | Performed by: NURSE PRACTITIONER

## 2023-09-05 PROCEDURE — 1160F RVW MEDS BY RX/DR IN RCRD: CPT | Mod: CPTII,,, | Performed by: NURSE PRACTITIONER

## 2023-09-05 PROCEDURE — 99214 OFFICE O/P EST MOD 30 MIN: CPT | Mod: S$PBB,,, | Performed by: NURSE PRACTITIONER

## 2023-09-05 PROCEDURE — 3080F PR MOST RECENT DIASTOLIC BLOOD PRESSURE >= 90 MM HG: ICD-10-PCS | Mod: CPTII,,, | Performed by: NURSE PRACTITIONER

## 2023-09-05 PROCEDURE — 3044F HG A1C LEVEL LT 7.0%: CPT | Mod: CPTII,,, | Performed by: NURSE PRACTITIONER

## 2023-09-05 PROCEDURE — 3062F POS MACROALBUMINURIA REV: CPT | Mod: CPTII,,, | Performed by: NURSE PRACTITIONER

## 2023-09-05 PROCEDURE — 3008F PR BODY MASS INDEX (BMI) DOCUMENTED: ICD-10-PCS | Mod: CPTII,,, | Performed by: NURSE PRACTITIONER

## 2023-09-05 PROCEDURE — 3075F SYST BP GE 130 - 139MM HG: CPT | Mod: CPTII,,, | Performed by: NURSE PRACTITIONER

## 2023-09-05 PROCEDURE — 4010F PR ACE/ARB THEARPY RXD/TAKEN: ICD-10-PCS | Mod: CPTII,,, | Performed by: NURSE PRACTITIONER

## 2023-09-05 PROCEDURE — 3080F DIAST BP >= 90 MM HG: CPT | Mod: CPTII,,, | Performed by: NURSE PRACTITIONER

## 2023-09-05 PROCEDURE — 3066F NEPHROPATHY DOC TX: CPT | Mod: CPTII,,, | Performed by: NURSE PRACTITIONER

## 2023-09-05 PROCEDURE — 99215 OFFICE O/P EST HI 40 MIN: CPT | Mod: PBBFAC | Performed by: NURSE PRACTITIONER

## 2023-09-05 PROCEDURE — 4010F ACE/ARB THERAPY RXD/TAKEN: CPT | Mod: CPTII,,, | Performed by: NURSE PRACTITIONER

## 2023-09-05 PROCEDURE — 99214 PR OFFICE/OUTPT VISIT, EST, LEVL IV, 30-39 MIN: ICD-10-PCS | Mod: S$PBB,,, | Performed by: NURSE PRACTITIONER

## 2023-09-05 PROCEDURE — 1160F PR REVIEW ALL MEDS BY PRESCRIBER/CLIN PHARMACIST DOCUMENTED: ICD-10-PCS | Mod: CPTII,,, | Performed by: NURSE PRACTITIONER

## 2023-09-05 PROCEDURE — 3008F BODY MASS INDEX DOCD: CPT | Mod: CPTII,,, | Performed by: NURSE PRACTITIONER

## 2023-09-05 PROCEDURE — 1159F MED LIST DOCD IN RCRD: CPT | Mod: CPTII,,, | Performed by: NURSE PRACTITIONER

## 2023-09-05 PROCEDURE — 3062F PR POS MACROALBUMINURIA RESULT DOCUMENTED/REVIEW: ICD-10-PCS | Mod: CPTII,,, | Performed by: NURSE PRACTITIONER

## 2023-09-05 PROCEDURE — 3066F PR DOCUMENTATION OF TREATMENT FOR NEPHROPATHY: ICD-10-PCS | Mod: CPTII,,, | Performed by: NURSE PRACTITIONER

## 2023-09-05 RX ORDER — SILDENAFIL 50 MG/1
50 TABLET, FILM COATED ORAL DAILY PRN
Qty: 30 TABLET | Refills: 11 | Status: SHIPPED | OUTPATIENT
Start: 2023-09-05 | End: 2024-09-04

## 2023-09-05 NOTE — PROGRESS NOTES
Chief Complaint:   Chief Complaint   Patient presents with    Erectile Dysfunction       HPI:  Patient is a 62-year-old male referred to Urology for erectile dysfunction.  Patient seen by his PCP on 07/26/2023 with symptoms of erectile dysfunction.  Patient currently on Viagra 25 mg p.o. daily with mild-to-moderate improvement.  Patient PSA 0.29 1 year ago, PSA 0.53 1 month ago.  Today patient denies dysuria, urinary urgency, frequency, incontinence, retention, gross hematuria, nocturia.  Plan: recheck PSA in 3 months trial Viagra 50 mg p.r.n.         Allergies:  Review of patient's allergies indicates:  No Known Allergies    Medications:  Current Outpatient Medications   Medication Sig Dispense Refill    blood sugar diagnostic Strp Inject 1 strip into the skin 2 (two) times a day. 100 strip 11    blood-glucose meter kit   True Metrix Glucometer, See Instructions, Use to check blood glucose daily, # 1 EA, 0 Refill(s), Pharmacy: [facility], 177.8, cm, Height/Length Dosing, 01/12/22 9:55:00 CST, 148.4, kg, Weight Dosing, 01/12/22 9:55:00 CST      EASY TOUCH TWIST LANCETS 33 gauge Misc USE AS DIRECTED TO CHECK BLOOD GLUCOSE DAILY      furosemide (LASIX) 40 MG tablet Take 1 tablet (40 mg total) by mouth 2 (two) times daily. 180 tablet 3    hydrALAZINE (APRESOLINE) 50 MG tablet Take 1 tablet (50 mg total) by mouth 2 (two) times daily. 180 tablet 3    KLOR-CON M20 20 mEq tablet Take 1 tablet (20 mEq total) by mouth once daily. 90 tablet 3    LANCETS MISC   True Metrix Lancets, See Instructions, Use to check blood glucose daily, # 100 EA, 11 Refill(s), Pharmacy: [facility], 177.8, cm, Height/Length Dosing, 01/12/22 9:55:00 CST, 148.4, kg, Weight Dosing, 01/12/22 9:55:00 CST      metFORMIN (GLUCOPHAGE-XR) 500 MG ER 24hr tablet Take 1 tablet (500 mg total) by mouth once daily. 90 tablet 3    metoprolol succinate (TOPROL-XL) 50 MG 24 hr tablet Take 1 tablet (50 mg total) by mouth once  daily. 90 tablet 3    pravastatin (PRAVACHOL) 40 MG tablet Take 1 tablet (40 mg total) by mouth every evening. 90 tablet 3    sacubitriL-valsartan (ENTRESTO) 49-51 mg per tablet Take 1 tablet by mouth 2 (two) times daily. 180 tablet 3    XARELTO 20 mg Tab Take 1 tablet (20 mg total) by mouth once daily. 90 tablet 3     No current facility-administered medications for this visit.       Review of Systems:  General: No fever, chills, fatigability, or weight loss.  Skin: No rashes, itching, or changes in color or texture of skin.  Chest: Denies JOSHI, cyanosis, wheezing, cough, and sputum production.  Abdomen: Appetite fine. No weight loss. Denies diarrhea, abdominal pain, hematemesis, or blood in stool.  Musculoskeletal: No joint stiffness or swelling. Denies back pain.  : As above.  All other review of systems negative.    PMH:  Past Medical History:   Diagnosis Date    Atrial flutter     Diabetes mellitus     Hypercholesteremia     Hypertension     Morbid obesity     Obese     Tobacco user        PSH:  Past Surgical History:   Procedure Laterality Date    ANGIOGRAM, CORONARY, WITH LEFT HEART CATHETERIZATION N/A 7/29/2022    Procedure: Angiogram, Coronary, with Left Heart Cath;  Surgeon: Yossi Zuniga MD;  Location: [facility] CATH LAB;  Service: Cardiology;  Laterality: N/A;    Cardioversion x 2      Comprehensive electrophysiologic eval. including insertion & repositioning of multiple electrode catheters with induction or attempted induction of arrhythmias with left atrial pacing & recordingfrom coronary sinus or left atrium  07/10/2018    comprehensive electrophysiologic eval. insertion & repositioning multiple electrode catheters with induction or attempted induction of an arrhythmia with right atrial pacing and recording, right ventricular pacing and recording  07/10/2018    Destruction of Conduction Mechanism, Percutaneous Approach  07/10/2018       FamHx:  Family History   Problem Relation Age of Onset     Hypertension Mother     Valvular heart disease Father        SocHx:  Social History     Socioeconomic History    Marital status: Single   Occupational History    Occupation: retired   Tobacco Use    Smoking status: Some Days     Types: Cigars     Passive exposure: Current    Smokeless tobacco: Never    Tobacco comments:     Occasionally, last smoked 7/22/23   Substance and Sexual Activity    Alcohol use: Yes     Comment: Drinks occasionally beer on weekends    Drug use: Never    Sexual activity: Yes     Partners: Female     Social Determinants of Health     Transportation Needs: Unmet Transportation Needs (9/19/2022)    PRAPARE - Transportation     Lack of Transportation (Medical): Yes     Lack of Transportation (Non-Medical): No   Social Connections: Moderately Isolated (1/26/2023)    Social Connection and Isolation Panel [NHANES]     Frequency of Communication with Friends and Family: More than three times a week     Frequency of Social Gatherings with Friends and Family: More than three times a week     Attends Oriental orthodox Services: More than 4 times per year     Active Member of Clubs or Organizations: No     Attends Club or Organization Meetings: Never     Marital Status: Never    Housing Stability: Low Risk  (9/19/2022)    Housing Stability Vital Sign     Unable to Pay for Housing in the Last Year: No     Number of Places Lived in the Last Year: 1     Unstable Housing in the Last Year: No       Physical Exam:  Vitals:    09/05/23 0741   BP: (!) 134/97   Pulse: (!) 118   Resp: 20   Temp: 98.6 °F (37 °C)     General: A&Ox3, no apparent distress, no deformities  Neck: No masses, normal thyroid  Lungs: CTA lori, no use of accessory muscles  Heart: RRR, no arrhythmias  Abdomen: Soft, NT, ND, no masses, no hernias, no hepatosplenomegaly  Lymphatic: Neck and groin nodes negative  Skin: The skin is warm and dry. No jaundice.  Ext: No c/c/e.    GUMale: Test desc lori, no abnormalities of epididymus. Penis, with  normal penile and scrotal skin. Meatus normal. Normal rectal tone, no hemorrhoids. Prostate: 2 finger breadth wide, small pea size nodule palpated on the right remaining prostate soft smooth., nontender. SV not palpable. Perineum and anus normal.            Impression:  1. Other male erectile dysfunction  - Ambulatory referral/consult to Urology      Plan:  Instructed patient to increase Viagra to 50 mg p.o. daily.  Instructed patient to RTC 3 months with PSA to re-evaluate PSA.  Instructed patient notify clinic of any abnormal urologic symptoms prior to next appointment.

## 2023-09-11 NOTE — PROGRESS NOTES
CHIEF COMPLAINT:   Chief Complaint   Patient presents with    4 month follow up      With sleep study and labs. Appointment was rescheduled, SOB with exertion, right leg swelling.                    Review of patient's allergies indicates:  No Known Allergies                                       HPI:  Jamey Duke 62 y.o. male with  NICMO/HFrEF (EF-40% per ECHO 1.30.23), h/o persistent AF/AFL (s/p multiple attempted DCCVs but has had recurrence of AFL) , DEEP, HTN, and HLD, SHADI/CPAP, and tobacco use (cigars)  presents for routine follow up and ongoing care.  He is a former patient of Dr. Gutierrez and Dr. Syed. Latest Echocardiogram obtained on 1.30.23 revealed an ejection fraction of 40% which is improved from previous EF of 35% per ECHO on 5.27.22. Patient completed a Lexiscan stress test on 6.30.22 that was abnormal revealing  moderate to severe intensity, moderate to large sized, reversible perfusion abnormality that is consistent with ischemia in the basal to apical lateral apical wall(s) in the typical distribution of the LCX territory and a small to moderate sized, moderate intensity, reversible perfusion abnormality that is consistent with ischemia in the basal to mid anterior wall(s) in the typical distribution of the LAD territory.  He underwent a subsequent LHC on 7.29.22  that revealed normal coronary arteries. (See reports below).    Patient presents to clinic today reporting that he feels well overall.  He denies any chest pain, orthopnea, PND, palpitations, lightheadedness, dizziness or syncope.  He does endorse occasional episodes of shortness of breath with over exertion but states that he is able to perform his normal ADLs and regular activities without experiencing any chest pain or shortness of breath.  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  He also endorses recent swelling to his right lower  extremity but denies any acute pain.  However the patient states that it may be related to his diet while at camp.  He endorses compliance with his current medications and is tolerating Xarelto without any bleeding issues.  He endorses nightly compliance with CPAP and is  benefitting from use.      PMH: HTN, HLD, h/o persistent AF/AFL, and h/o NICMP  PSH: ablation  Social History: Denies any illicit drug, tobacco use-smokes occasional cigar . Reports occasional ETOH use  Family History: Father -leaky valve    Previous Diagnostics:   ECHO 1.30.23  The left ventricle is normal in size with mildly decreased systolic function.  Normal right ventricular size with normal right ventricular systolic function.  The estimated ejection fraction is 40%.  Moderate right atrial enlargement.  Mild mitral regurgitation.  Atrial fibrillation observed.  There is mild to moderate pulmonary hypertension.  Moderate left atrial enlargement.    Cardiac catheterization 7.29.22    Conclusion  · The pre-procedure left ventricular end diastolic pressure was 22.  · The estimated blood loss was none.  · The coronary arteries were normal..    FINDINGS  The patient has No CAD  Blood loss:  less than 15 cc.    RECOMMENDATIONS  Medical management -- maximize GDMT  Risk factor modifications -- smoking cessation  Activity -- avoid straining with affected limb for one week  Exercise on regular basis.    Lexiscan stress test 6.20.22       Abnormal myocardial perfusion scan.    There are two significant perfusion abnormalities.    Perfusion Abnormality #1 - There is a moderate to severe intensity, moderate to large sized, reversible perfusion abnormality that is consistent with ischemia in the basal to apical lateral apical wall(s) in the typical distribution of the LCX territory.    Perfusion Abnormality #2 - There is a small to moderate sized, moderate intensity, reversible perfusion abnormality that is consistent with ischemia in the basal to mid  anterior wall(s) in the typical distribution of the LAD territory.    There are no other significant perfusion abnormalities.    The gated perfusion images showed an ejection fraction of 36% at rest. The gated perfusion images showed an ejection fraction of 33% post stress.    The EKG portion of this study is abnormal but not diagnostic.    The patient reported no chest pain during the stress test.    There were no arrhythmias during stress.     If clinically indicated, consider further evaluation with coronary angiogram.            ECHO 5.27.22     The left ventricle is normal in size with moderately decreased systolic function.  The estimated ejection fraction is 35%.  Atrial fibrillation observed.  Mild mitral regurgitation.  Mild tricuspid regurgitation.  IVC is dilated.IVC collapses >50%.  Intermediate central venous pressure (8 mmHg).  The estimated PA systolic pressure is 37 mmHg.  Normal right ventricular size with normal right ventricular systolic function.  There is no pulmonary hypertension.       TTE 1.24.20    1. The study quality is below average.  2. The left ventricle is normal in size. Global left ventricular systolic function is mildly decreased. The left ventricular ejection fraction is 45%.  3. The left atrial diameter is mildly increased. The right atrium is mildly enlarged.  4. Mild (1+) mitral regurgitation. Mild (1+) tricuspid regurgitation.  5. The pulmonary artery systolic pressure is 35 mmHg.     PET 12.6.18   This is a normal perfusion study, no perfusion defects noted.   This scan is suggestive of low risk for future cardiovascular events.   The left ventricular cavity is noted to be moderately enlarged on the stress studies. The stress left ventricular ejection fraction was calculated to be 45% and left ventricular global function is mildly reduced. The rest left ventricular cavity is noted to be mildly enlarged. The rest left ventricular ejection fraction was calculated to be 39% and  rest left ventricular global function is moderately reduced.   When compared to the resting ejection fraction (39%), the stress ejection fraction (45%) has increased.   The study quality is good.    Carotid US 2.9.18  1. The study quality is below average.  2. 1-39% stenosis in the proximal right internal carotid artery based on Bluth Criteria.  3. Antegrade right vertebral artery flow.  4. Antegrade left vertebral artery flow    [facility] 1.3.17  EF -20%  Normal coronaries                                                                                                                                                                                                                                                                                                      Patient Active Problem List   Diagnosis    Atrial flutter    Hyperlipidemia    Hypertension    Diabetes mellitus    Tobacco user    Body mass index (BMI) of 45.0 to 49.9 in adult    Subclinical hyperthyroidism    Other male erectile dysfunction    Acute systolic heart failure    AMD (age-related macular degeneration), bilateral    Stage 3a chronic kidney disease    Persistent proteinuria    Chronic pain of both knees     Past Surgical History:   Procedure Laterality Date    ANGIOGRAM, CORONARY, WITH LEFT HEART CATHETERIZATION N/A 7/29/2022    Procedure: Angiogram, Coronary, with Left Heart Cath;  Surgeon: Yossi Zuniga MD;  Location: [facility] CATH LAB;  Service: Cardiology;  Laterality: N/A;    Cardioversion x 2      Comprehensive electrophysiologic eval. including insertion & repositioning of multiple electrode catheters with induction or attempted induction of arrhythmias with left atrial pacing & recordingfrom coronary sinus or left atrium  07/10/2018    comprehensive electrophysiologic eval. insertion & repositioning multiple electrode catheters with induction or attempted induction of an arrhythmia with right atrial pacing and recording, right ventricular  pacing and recording  07/10/2018    Destruction of Conduction Mechanism, Percutaneous Approach  07/10/2018     Social History     Socioeconomic History    Marital status: Single   Occupational History    Occupation: retired   Tobacco Use    Smoking status: Some Days     Types: Cigars     Passive exposure: Current    Smokeless tobacco: Never    Tobacco comments:     Occasionally, last smoked 7/22/23   Substance and Sexual Activity    Alcohol use: Yes     Comment: Drinks occasionally beer on weekends    Drug use: Never    Sexual activity: Yes     Partners: Female     Social Determinants of Health     Transportation Needs: Unmet Transportation Needs (9/19/2022)    PRAPARE - Transportation     Lack of Transportation (Medical): Yes     Lack of Transportation (Non-Medical): No   Social Connections: Moderately Isolated (1/26/2023)    Social Connection and Isolation Panel [NHANES]     Frequency of Communication with Friends and Family: More than three times a week     Frequency of Social Gatherings with Friends and Family: More than three times a week     Attends Pentecostal Services: More than 4 times per year     Active Member of Clubs or Organizations: No     Attends Club or Organization Meetings: Never     Marital Status: Never    Housing Stability: Low Risk  (9/19/2022)    Housing Stability Vital Sign     Unable to Pay for Housing in the Last Year: No     Number of Places Lived in the Last Year: 1     Unstable Housing in the Last Year: No        Family History   Problem Relation Age of Onset    Hypertension Mother     Valvular heart disease Father          Current Outpatient Medications:     furosemide (LASIX) 40 MG tablet, Take 1 tablet (40 mg total) by mouth 2 (two) times daily., Disp: 180 tablet, Rfl: 3    hydrALAZINE (APRESOLINE) 50 MG tablet, Take 1 tablet (50 mg total) by mouth 2 (two) times daily., Disp: 180 tablet, Rfl: 3    KLOR-CON M20 20 mEq tablet, Take 1 tablet (20 mEq total) by mouth once daily.,  Disp: 90 tablet, Rfl: 3    metFORMIN (GLUCOPHAGE-XR) 500 MG ER 24hr tablet, Take 1 tablet (500 mg total) by mouth once daily., Disp: 90 tablet, Rfl: 3    metoprolol succinate (TOPROL-XL) 50 MG 24 hr tablet, Take 1 tablet (50 mg total) by mouth once daily., Disp: 90 tablet, Rfl: 3    pravastatin (PRAVACHOL) 40 MG tablet, Take 1 tablet (40 mg total) by mouth every evening., Disp: 90 tablet, Rfl: 3    sacubitriL-valsartan (ENTRESTO) 49-51 mg per tablet, Take 1 tablet by mouth 2 (two) times daily., Disp: 180 tablet, Rfl: 3    sildenafiL (VIAGRA) 50 MG tablet, Take 1 tablet (50 mg total) by mouth daily as needed for Erectile Dysfunction., Disp: 30 tablet, Rfl: 11    XARELTO 20 mg Tab, Take 1 tablet (20 mg total) by mouth once daily., Disp: 90 tablet, Rfl: 3    blood sugar diagnostic Strp, Inject 1 strip into the skin 2 (two) times a day., Disp: 100 strip, Rfl: 11    blood-glucose meter kit,  True Metrix Glucometer, See Instructions, Use to check blood glucose daily, # 1 EA, 0 Refill(s), Pharmacy: [facility], 177.8, cm, Height/Length Dosing, 01/12/22 9:55:00 CST, 148.4, kg, Weight Dosing, 01/12/22 9:55:00 CST, Disp: , Rfl:     EASY TOUCH TWIST LANCETS 33 gauge Misc, USE AS DIRECTED TO CHECK BLOOD GLUCOSE DAILY, Disp: , Rfl:     LANCETS MISC,  True Metrix Lancets, See Instructions, Use to check blood glucose daily, # 100 EA, 11 Refill(s), Pharmacy: [facility], 177.8, cm, Height/Length Dosing, 01/12/22 9:55:00 CST, 148.4, kg, Weight Dosing, 01/12/22 9:55:00 CST, Disp: , Rfl:      ROS:                                                                                                                                                                             Review of Systems   Constitutional: Negative.    HENT: Negative.     Eyes: Negative.    Respiratory:  Positive for shortness of breath.    Cardiovascular: Negative.    Gastrointestinal: Negative.    Genitourinary: Negative.   "  Musculoskeletal: Negative.    Skin: Negative.    Neurological: Negative.    Endo/Heme/Allergies: Negative.    Psychiatric/Behavioral: Negative.          Blood pressure (!) 126/97, pulse 87, temperature 97.9 °F (36.6 °C), resp. rate 18, height 5' 10.47" (1.79 m), weight (!) 146.6 kg (323 lb 3.2 oz), SpO2 100 %.   PE:  Physical Exam  Constitutional:       Appearance: Normal appearance.   HENT:      Head: Normocephalic.   Eyes:      Pupils: Pupils are equal, round, and reactive to light.   Cardiovascular:      Rate and Rhythm: Normal rate and regular rhythm.      Pulses: Normal pulses.   Pulmonary:      Effort: Pulmonary effort is normal.   Musculoskeletal:         General: Normal range of motion.   Skin:     General: Skin is warm and dry.   Neurological:      General: No focal deficit present.      Mental Status: He is alert and oriented to person, place, and time.   Psychiatric:         Mood and Affect: Mood normal.         Behavior: Behavior normal.                ASSESSMENT/PLAN:  NICMP/HFrEF NYHA Class I- II  - LVEF - 40% per ECHO 1.30.23 improved from -->35% per TTE 5.27.22 --> 45% per TTE (1.24.20)  - Reports  SOB with over exertion   - Euvolemic upon exam, warm and dry   - s/p LHC- normal coronaries (7.29.22)  - Lexiscan  stress test - to reversible abnormalities ischemic defects (6.20.22)  - LH- EF-20%, Normal coronaries (1.3.17)  - Continue GDMT: Toprol succinate 50 daily, Entresto- 49/51 mg BID. Will add Jardiance 10 mg   - Continue Lasix 40 as directed  - Counseled patient on low-sodium diet   - May consider add Aldactone pending renal indices. Has upcoming Referral with nephrology   - Will plan to repeat ECHO after optimization of GDMT.     H/o persistent AF/AFL Status post RFA on (7.10.18)   - Has multiple attempted DCCVs but has had recurrence of AFL  - Denies palpitations  - Continue Toprol succinate 50 qd and Xarelto 20 mg p.o. daily. Denies any adverse bleeding issues      HTN- BP at goal   - " Continue current medications   - Counseled on low-sodium diet     Diabetes  - A1C- 6.3  - Continue management per PCP     HLD- LDL -100   - Continue Pravastatin 40 mg po daily.  - Counseled patient on low-cholesterol, low-fat diet, and encourage exercise as tolerate    Carotid Artery disease- Asymptomatic   - 1-39% stenosis in the proximal right internal carotid artery based on Bluth Criteria. (2018)    Tobacco use  - Reports occasional cigar use.  - Counseled on importance of smoking cessation    SHADI/CPAP  - Reports nightly compliance with CPAP      Add Jardiance 10 mg once daily   Nurse visit in 2-3 weeks for BP check  Follow-up in cardiology clinic in 4 months or sooner if needed   Follow up with PCP as directed

## 2023-09-15 ENCOUNTER — OFFICE VISIT (OUTPATIENT)
Dept: CARDIOLOGY | Facility: CLINIC | Age: 63
End: 2023-09-15
Payer: COMMERCIAL

## 2023-09-15 VITALS
OXYGEN SATURATION: 100 % | BODY MASS INDEX: 45.1 KG/M2 | HEIGHT: 70 IN | SYSTOLIC BLOOD PRESSURE: 128 MMHG | RESPIRATION RATE: 18 BRPM | DIASTOLIC BLOOD PRESSURE: 90 MMHG | HEART RATE: 87 BPM | WEIGHT: 315 LBS | TEMPERATURE: 98 F

## 2023-09-15 DIAGNOSIS — E78.2 MIXED HYPERLIPIDEMIA: Chronic | ICD-10-CM

## 2023-09-15 DIAGNOSIS — I42.8 NICM (NONISCHEMIC CARDIOMYOPATHY): Primary | ICD-10-CM

## 2023-09-15 DIAGNOSIS — I10 PRIMARY HYPERTENSION: Chronic | ICD-10-CM

## 2023-09-15 DIAGNOSIS — Z72.0 TOBACCO USER: Chronic | ICD-10-CM

## 2023-09-15 DIAGNOSIS — I48.0 PAROXYSMAL ATRIAL FIBRILLATION: ICD-10-CM

## 2023-09-15 DIAGNOSIS — I50.20 HFREF (HEART FAILURE WITH REDUCED EJECTION FRACTION): ICD-10-CM

## 2023-09-15 DIAGNOSIS — G47.33 OSA ON CPAP: ICD-10-CM

## 2023-09-15 DIAGNOSIS — I48.92 ATRIAL FLUTTER, UNSPECIFIED TYPE: ICD-10-CM

## 2023-09-15 PROCEDURE — 3074F PR MOST RECENT SYSTOLIC BLOOD PRESSURE < 130 MM HG: ICD-10-PCS | Mod: CPTII,,, | Performed by: NURSE PRACTITIONER

## 2023-09-15 PROCEDURE — 3008F PR BODY MASS INDEX (BMI) DOCUMENTED: ICD-10-PCS | Mod: CPTII,,, | Performed by: NURSE PRACTITIONER

## 2023-09-15 PROCEDURE — 3062F POS MACROALBUMINURIA REV: CPT | Mod: CPTII,,, | Performed by: NURSE PRACTITIONER

## 2023-09-15 PROCEDURE — 1160F RVW MEDS BY RX/DR IN RCRD: CPT | Mod: CPTII,,, | Performed by: NURSE PRACTITIONER

## 2023-09-15 PROCEDURE — 3066F PR DOCUMENTATION OF TREATMENT FOR NEPHROPATHY: ICD-10-PCS | Mod: CPTII,,, | Performed by: NURSE PRACTITIONER

## 2023-09-15 PROCEDURE — 1159F MED LIST DOCD IN RCRD: CPT | Mod: CPTII,,, | Performed by: NURSE PRACTITIONER

## 2023-09-15 PROCEDURE — 99215 OFFICE O/P EST HI 40 MIN: CPT | Mod: PBBFAC | Performed by: NURSE PRACTITIONER

## 2023-09-15 PROCEDURE — 99214 PR OFFICE/OUTPT VISIT, EST, LEVL IV, 30-39 MIN: ICD-10-PCS | Mod: S$PBB,,, | Performed by: NURSE PRACTITIONER

## 2023-09-15 PROCEDURE — 3066F NEPHROPATHY DOC TX: CPT | Mod: CPTII,,, | Performed by: NURSE PRACTITIONER

## 2023-09-15 PROCEDURE — 3008F BODY MASS INDEX DOCD: CPT | Mod: CPTII,,, | Performed by: NURSE PRACTITIONER

## 2023-09-15 PROCEDURE — 3062F PR POS MACROALBUMINURIA RESULT DOCUMENTED/REVIEW: ICD-10-PCS | Mod: CPTII,,, | Performed by: NURSE PRACTITIONER

## 2023-09-15 PROCEDURE — 3080F DIAST BP >= 90 MM HG: CPT | Mod: CPTII,,, | Performed by: NURSE PRACTITIONER

## 2023-09-15 PROCEDURE — 3044F HG A1C LEVEL LT 7.0%: CPT | Mod: CPTII,,, | Performed by: NURSE PRACTITIONER

## 2023-09-15 PROCEDURE — 4010F ACE/ARB THERAPY RXD/TAKEN: CPT | Mod: CPTII,,, | Performed by: NURSE PRACTITIONER

## 2023-09-15 PROCEDURE — 1159F PR MEDICATION LIST DOCUMENTED IN MEDICAL RECORD: ICD-10-PCS | Mod: CPTII,,, | Performed by: NURSE PRACTITIONER

## 2023-09-15 PROCEDURE — 1160F PR REVIEW ALL MEDS BY PRESCRIBER/CLIN PHARMACIST DOCUMENTED: ICD-10-PCS | Mod: CPTII,,, | Performed by: NURSE PRACTITIONER

## 2023-09-15 PROCEDURE — 3044F PR MOST RECENT HEMOGLOBIN A1C LEVEL <7.0%: ICD-10-PCS | Mod: CPTII,,, | Performed by: NURSE PRACTITIONER

## 2023-09-15 PROCEDURE — 3074F SYST BP LT 130 MM HG: CPT | Mod: CPTII,,, | Performed by: NURSE PRACTITIONER

## 2023-09-15 PROCEDURE — 99214 OFFICE O/P EST MOD 30 MIN: CPT | Mod: S$PBB,,, | Performed by: NURSE PRACTITIONER

## 2023-09-15 PROCEDURE — 3080F PR MOST RECENT DIASTOLIC BLOOD PRESSURE >= 90 MM HG: ICD-10-PCS | Mod: CPTII,,, | Performed by: NURSE PRACTITIONER

## 2023-09-15 PROCEDURE — 4010F PR ACE/ARB THEARPY RXD/TAKEN: ICD-10-PCS | Mod: CPTII,,, | Performed by: NURSE PRACTITIONER

## 2023-09-15 RX ORDER — RIVAROXABAN 20 MG/1
20 TABLET, FILM COATED ORAL DAILY
Qty: 90 TABLET | Refills: 3 | Status: SHIPPED | OUTPATIENT
Start: 2023-09-15 | End: 2024-09-14

## 2023-09-15 NOTE — PATIENT INSTRUCTIONS
Add Jardiance 10 mg once daily   Nurse visit in 2-3 weeks for BP check  Follow-up in cardiology clinic in 4 months or sooner if needed   Follow up with PCP as directed

## 2023-10-21 ENCOUNTER — OFFICE VISIT (OUTPATIENT)
Dept: INTERNAL MEDICINE | Facility: CLINIC | Age: 63
End: 2023-10-21
Payer: COMMERCIAL

## 2023-10-21 ENCOUNTER — CLINICAL SUPPORT (OUTPATIENT)
Dept: INTERNAL MEDICINE | Facility: CLINIC | Age: 63
End: 2023-10-21
Payer: COMMERCIAL

## 2023-10-21 VITALS
WEIGHT: 315 LBS | HEIGHT: 70 IN | DIASTOLIC BLOOD PRESSURE: 79 MMHG | OXYGEN SATURATION: 100 % | SYSTOLIC BLOOD PRESSURE: 133 MMHG | HEART RATE: 96 BPM | BODY MASS INDEX: 45.1 KG/M2

## 2023-10-21 DIAGNOSIS — Z74.09 OTHER REDUCED MOBILITY: ICD-10-CM

## 2023-10-21 DIAGNOSIS — Z00.00 MEDICARE ANNUAL WELLNESS VISIT, SUBSEQUENT: Primary | ICD-10-CM

## 2023-10-21 DIAGNOSIS — Z00.00 ENCOUNTER FOR PREVENTIVE HEALTH EXAMINATION: ICD-10-CM

## 2023-10-21 DIAGNOSIS — Z00.00 MEDICARE ANNUAL WELLNESS VISIT, SUBSEQUENT: ICD-10-CM

## 2023-10-21 PROCEDURE — 99214 OFFICE O/P EST MOD 30 MIN: CPT | Mod: PBBFAC | Performed by: STUDENT IN AN ORGANIZED HEALTH CARE EDUCATION/TRAINING PROGRAM

## 2023-10-21 NOTE — PATIENT INSTRUCTIONS
Counseling and Referral of Other Preventative  (Italic type indicates deductible and co-insurance are waived)    Patient Name: Jamey Duke  Today's Date: 10/21/2023    Health Maintenance       Date Due Completion Date    Pneumococcal Vaccines (Age 65+) (1 - PCV) Never done ---    TETANUS VACCINE Never done ---    Eye Exam 05/12/2023 5/12/2022    COVID-19 Vaccine (3 - 2023-24 season) 09/01/2023 4/13/2021    Abdominal Aortic Aneurysm Screening Never done ---    Shingles Vaccine (2 of 2) 12/13/2023 10/18/2023    Hemoglobin A1c 01/26/2024 7/26/2023    Diabetes Urine Screening 07/26/2024 7/26/2023    Foot Exam 07/26/2024 7/26/2023    Lipid Panel 07/26/2024 7/26/2023    Low Dose Statin 09/15/2024 9/15/2023    Colorectal Cancer Screening 08/02/2026 8/2/2023        Orders Placed This Encounter   Procedures    Vascular Lab () US AAA Screening    (In Office Administered) Pneumococcal Conjugate Vaccine (20 Valent) (IM) (Preferred)    Diabetic Eye Screening Photo       The following information is provided to all patients.  This information is to help you find resources for any of the problems found today that may be affecting your health:                Living healthy guide: www.[facility].[place].gov      Understanding Diabetes: www.diabetes.org      Eating healthy: www.cdc.gov/healthyweight      CDC home safety checklist: www.cdc.gov/steadi/patient.html      Agency on Aging: www.goea.[place].[facility]      Alcoholics anonymous (AA): www.aa.org      Physical Activity: www.ranulfo.nih.gov/ig0nkkv      Tobacco use: www.quitwithusla.org

## 2023-10-21 NOTE — PROGRESS NOTES
"    Jamey Duke  62 y.o. Black or  male presents to the clinic for DM f/u. PMH DM, HTN, HLD, h/o persistent AF/AFL, DEEP, and h/o NICMP w/multiple DCCVs -AFL recurred, macular degeneration, and ED presented for a follow-up Medicare AWV today. The following components were reviewed and updated:    Medical history  Family History  Social history  Allergies and Current Medications  Health Risk Assessment  Health Maintenance  Care Team    **See Completed Assessments for Annual Wellness visit with in the encounter summary    The following assessments were completed:  Depression Screening  Cognitive function Screening  Timed Get Up Test  Whisper Test    Vitals:    10/21/23 0753   BP: 133/79   BP Location: Right arm   Patient Position: Sitting   BP Method: X-Large (Automatic)   Pulse: 96   SpO2: 100%   Weight: (!) 145.8 kg (321 lb 6.4 oz)   Height: 5' 10" (1.778 m)     Body mass index is 46.12 kg/m².   ]    General:  Well developed, well nourished, no acute respiratory distress  Head: Normocephalic, atraumatic  Eyes: PERRL, EOMI, anicteric sclera  Throat: No posterior pharyngeal erythema or exudate, no tonsillar exudate  Neck: supple, normal ROM, no thyromegaly   CVS:  RRR, S1 and S2 normal, no murmurs, no added heart sounds, rubs, gallops, 2+ peripheral pulses  Resp:  Lungs clear to auscultation bilaterally, no wheezes, rales, or rhonci  GI:  Abdomen soft, non-tender, non-distended, normoactive bowel sounds  MSK:  No muscle atrophy, cyanosis, peripheral edema, full range of motion  Skin:  No rashes, ulcers, erythema  Neuro:  Alert and oriented x3, No focal neuro deficits, CNII-XII grossly intact  Psych:  Appropriate mood and affect       Diagnoses and health risks identified today and associated recommendations/orders:    Annual Wellness Exam  Diabetes type 2  Smoking history    - will order US AAA  - Zoster- 10/18/2023, need 2nd dose  - Tdap- need  - Pneumovax- need prevnar 20 can receive at next PCP " visit.  -Prostate cancer screening- .53 7/26/2023  - Colon cancer Screening- cologuard positive on 8/2/2023 and has referral for colonoscopy   - Lung Cancer Screening- smoking history but does not meet criteria for screening      Provided Jamey with a 5-10 year written screening schedule and personal prevention plan. Recommendations were developed using the USPSTF age appropriate recommendations. Education, counseling, and referrals were provided as needed.  After Visit Summary printed and given to patient which includes a list of additional screenings\tests needed.    Follow up in about 1 year (around 10/21/2024).      Barxton Nance MD

## 2023-10-21 NOTE — PROGRESS NOTES
..Jamey Duke is a 63 y.o. male here for a diabetic eye screening with non-dilated fundus photos per Dr. Pickens.    Patient cooperative?: Yes  Small pupils?: No  Last eye exam: unknown    For exam results, see Encounter Report.

## 2023-11-03 ENCOUNTER — LAB VISIT (OUTPATIENT)
Dept: LAB | Facility: HOSPITAL | Age: 63
End: 2023-11-03
Attending: STUDENT IN AN ORGANIZED HEALTH CARE EDUCATION/TRAINING PROGRAM
Payer: COMMERCIAL

## 2023-11-03 ENCOUNTER — OFFICE VISIT (OUTPATIENT)
Dept: NEPHROLOGY | Facility: CLINIC | Age: 63
End: 2023-11-03
Payer: COMMERCIAL

## 2023-11-03 VITALS
DIASTOLIC BLOOD PRESSURE: 94 MMHG | HEIGHT: 70 IN | SYSTOLIC BLOOD PRESSURE: 132 MMHG | RESPIRATION RATE: 20 BRPM | HEART RATE: 98 BPM | TEMPERATURE: 98 F | OXYGEN SATURATION: 99 % | WEIGHT: 315 LBS | BODY MASS INDEX: 45.1 KG/M2

## 2023-11-03 DIAGNOSIS — N18.31 STAGE 3A CHRONIC KIDNEY DISEASE: Chronic | ICD-10-CM

## 2023-11-03 DIAGNOSIS — R80.1 PERSISTENT PROTEINURIA: Chronic | ICD-10-CM

## 2023-11-03 DIAGNOSIS — R80.1 PERSISTENT PROTEINURIA: ICD-10-CM

## 2023-11-03 DIAGNOSIS — N18.31 STAGE 3A CHRONIC KIDNEY DISEASE: ICD-10-CM

## 2023-11-03 DIAGNOSIS — Z12.5 SCREENING PSA (PROSTATE SPECIFIC ANTIGEN): ICD-10-CM

## 2023-11-03 DIAGNOSIS — Z23 NEED FOR PNEUMOCOCCAL VACCINE: Primary | ICD-10-CM

## 2023-11-03 LAB
ALBUMIN SERPL-MCNC: 3.6 G/DL (ref 3.4–4.8)
ALBUMIN/GLOB SERPL: 0.8 RATIO (ref 1.1–2)
ALP SERPL-CCNC: 117 UNIT/L (ref 40–150)
ALT SERPL-CCNC: 17 UNIT/L (ref 0–55)
AST SERPL-CCNC: 19 UNIT/L (ref 5–34)
BASOPHILS # BLD AUTO: 0.05 X10(3)/MCL
BASOPHILS NFR BLD AUTO: 0.5 %
BILIRUB SERPL-MCNC: 0.3 MG/DL
BUN SERPL-MCNC: 18.4 MG/DL (ref 8.4–25.7)
CALCIUM SERPL-MCNC: 9.4 MG/DL (ref 8.8–10)
CHLORIDE SERPL-SCNC: 105 MMOL/L (ref 98–107)
CO2 SERPL-SCNC: 26 MMOL/L (ref 23–31)
CREAT SERPL-MCNC: 1.29 MG/DL (ref 0.73–1.18)
EOSINOPHIL # BLD AUTO: 0.19 X10(3)/MCL (ref 0–0.9)
EOSINOPHIL NFR BLD AUTO: 1.9 %
ERYTHROCYTE [DISTWIDTH] IN BLOOD BY AUTOMATED COUNT: 14.6 % (ref 11.5–17)
GFR SERPLBLD CREATININE-BSD FMLA CKD-EPI: >60 MLS/MIN/1.73/M2
GLOBULIN SER-MCNC: 4.5 GM/DL (ref 2.4–3.5)
GLUCOSE SERPL-MCNC: 114 MG/DL (ref 82–115)
HCT VFR BLD AUTO: 47.1 % (ref 42–52)
HGB BLD-MCNC: 15.4 G/DL (ref 14–18)
IGA SERPL-MCNC: 743 MG/DL (ref 101–645)
IGG SERPL-MCNC: 1331 MG/DL (ref 540–1822)
IGM SERPL-MCNC: 209 MG/DL (ref 22–240)
IMM GRANULOCYTES # BLD AUTO: 0.03 X10(3)/MCL (ref 0–0.04)
IMM GRANULOCYTES NFR BLD AUTO: 0.3 %
LYMPHOCYTES # BLD AUTO: 2.16 X10(3)/MCL (ref 0.6–4.6)
LYMPHOCYTES NFR BLD AUTO: 21.1 %
MCH RBC QN AUTO: 29.3 PG (ref 27–31)
MCHC RBC AUTO-ENTMCNC: 32.7 G/DL (ref 33–36)
MCV RBC AUTO: 89.7 FL (ref 80–94)
MONOCYTES # BLD AUTO: 0.96 X10(3)/MCL (ref 0.1–1.3)
MONOCYTES NFR BLD AUTO: 9.4 %
NEUTROPHILS # BLD AUTO: 6.87 X10(3)/MCL (ref 2.1–9.2)
NEUTROPHILS NFR BLD AUTO: 66.8 %
NRBC BLD AUTO-RTO: 0 %
PLATELET # BLD AUTO: 243 X10(3)/MCL (ref 130–400)
PMV BLD AUTO: 13.2 FL (ref 7.4–10.4)
POTASSIUM SERPL-SCNC: 3.9 MMOL/L (ref 3.5–5.1)
PROT SERPL-MCNC: 8.1 GM/DL (ref 5.8–7.6)
PSA SERPL-MCNC: 0.57 NG/ML
PTH-INTACT SERPL-MCNC: 185.7 PG/ML (ref 8.7–77)
RBC # BLD AUTO: 5.25 X10(6)/MCL (ref 4.7–6.1)
SODIUM SERPL-SCNC: 140 MMOL/L (ref 136–145)
WBC # SPEC AUTO: 10.26 X10(3)/MCL (ref 4.5–11.5)

## 2023-11-03 PROCEDURE — 86038 ANTINUCLEAR ANTIBODIES: CPT

## 2023-11-03 PROCEDURE — 99215 OFFICE O/P EST HI 40 MIN: CPT | Mod: PBBFAC,25 | Performed by: INTERNAL MEDICINE

## 2023-11-03 PROCEDURE — 86036 ANCA SCREEN EACH ANTIBODY: CPT | Mod: 59

## 2023-11-03 PROCEDURE — 86334 IMMUNOFIX E-PHORESIS SERUM: CPT

## 2023-11-03 PROCEDURE — 83970 ASSAY OF PARATHORMONE: CPT

## 2023-11-03 PROCEDURE — 84153 ASSAY OF PSA TOTAL: CPT

## 2023-11-03 PROCEDURE — 85025 COMPLETE CBC W/AUTO DIFF WBC: CPT

## 2023-11-03 PROCEDURE — 84165 PROTEIN E-PHORESIS SERUM: CPT

## 2023-11-03 PROCEDURE — 36415 COLL VENOUS BLD VENIPUNCTURE: CPT

## 2023-11-03 PROCEDURE — 82784 ASSAY IGA/IGD/IGG/IGM EACH: CPT

## 2023-11-03 PROCEDURE — 80053 COMPREHEN METABOLIC PANEL: CPT

## 2023-11-03 PROCEDURE — 83521 IG LIGHT CHAINS FREE EACH: CPT

## 2023-11-03 PROCEDURE — 90677 PCV20 VACCINE IM: CPT | Mod: PBBFAC

## 2023-11-03 PROCEDURE — 86039 ANTINUCLEAR ANTIBODIES (ANA): CPT

## 2023-11-03 NOTE — PROGRESS NOTES
[facility] Note    Chief Complaint   Patient presents with    Proteinuria     New referral        History of Present Illness  This is a 63 y.o. Black or  male with past medical history of HTN, HLD, DM, CKD 3a, NICM, HFrEF (EF 40%), and persistent proteinuria, who presents to establish care at [facility] clinic for proteinuria.    Per most recent labs in 7/2023:  Micro alb/creat ratio 782  Cr 1.59  GFR 48  UA 2+ proteinuria    Review of Systems  Twelve point review of systems conducted, negative except as stated in history of present illness.    Review of patient's allergies indicates:  No Known Allergies    Past Medical History:   Past Medical History:   Diagnosis Date    Atrial flutter     Diabetes mellitus     Hypercholesteremia     Hypertension     Morbid obesity     Obese     Tobacco user        Procedure History:   Past Surgical History:   Procedure Laterality Date    ANGIOGRAM, CORONARY, WITH LEFT HEART CATHETERIZATION N/A 07/29/2022    Procedure: Angiogram, Coronary, with Left Heart Cath;  Surgeon: Yossi Zuniga MD;  Location: [facility] CATH LAB;  Service: Cardiology;  Laterality: N/A;    Cardioversion x 2      COLONOSCOPY  10/24/2023    Comprehensive electrophysiologic eval. including insertion & repositioning of multiple electrode catheters with induction or attempted induction of arrhythmias with left atrial pacing & recordingfrom coronary sinus or left atrium  07/10/2018    comprehensive electrophysiologic eval. insertion & repositioning multiple electrode catheters with induction or attempted induction of an arrhythmia with right atrial pacing and recording, right ventricular pacing and recording  07/10/2018    Destruction of Conduction Mechanism, Percutaneous Approach  07/10/2018       Family History: family history includes Hypertension in his mother; Valvular heart disease in his father.    Social History:  reports that he has been smoking cigars. He has been exposed to  "tobacco smoke. He has never used smokeless tobacco. He reports current alcohol use of about 6.0 standard drinks of alcohol per week. He reports that he does not use drugs.    Physical Exam:   BP (!) 132/94 (BP Location: Left arm, Patient Position: Sitting, BP Method: Large (Manual))   Pulse 98   Temp 97.6 °F (36.4 °C) (Oral)   Resp 20   Ht 5' 10" (1.778 m)   Wt (!) 145.1 kg (319 lb 12.8 oz)   SpO2 99%   BMI 45.89 kg/m²  Body mass index is 45.89 kg/m².  General appearance: Patient is in no acute distress.  Skin: No rashes or wounds.  HEENT: NC/AT, no scleral icterus  Chest: Respirations are unlabored. Lungs sounds are clear.   Heart: Irregular rate  Abdomen: Benign.  : Deferred.  Extremities: No edema  Neuro: No focal deficits.     Home Medications:  Prior to Admission medications    Medication Sig Start Date End Date Taking? Authorizing Provider   blood sugar diagnostic Strp Inject 1 strip into the skin 2 (two) times a day. 5/12/22  Yes Geetha Pickens FNP   blood-glucose meter kit   True Metrix Glucometer, See Instructions, Use to check blood glucose daily, # 1 EA, 0 Refill(s), Pharmacy: [facility], 177.8, cm, Height/Length Dosing, 01/12/22 9:55:00 CST, 148.4, kg, Weight Dosing, 01/12/22 9:55:00 CST 1/14/22  Yes Provider, Historical   EASY TOUCH TWIST LANCETS 33 gauge Misc USE AS DIRECTED TO CHECK BLOOD GLUCOSE DAILY 1/14/22  Yes Provider, Historical   empagliflozin (JARDIANCE) 10 mg tablet Take 1 tablet (10 mg total) by mouth once daily. 9/15/23 9/14/24 Yes Devan, Laterica L, FNP   furosemide (LASIX) 40 MG tablet Take 1 tablet (40 mg total) by mouth 2 (two) times daily. 3/14/23 3/13/24 Yes Devan, Laterica L, FNP   hydrALAZINE (APRESOLINE) 50 MG tablet Take 1 tablet (50 mg total) by mouth 2 (two) times daily. 3/14/23 3/13/24 Yes Christin Hartman FNP   KLOR-CON M20 20 mEq tablet Take 1 tablet (20 mEq total) by mouth once daily. 3/14/23 3/13/24 Yes Christin Hartman FNP "   LANCETS MISC   True Metrix Lancets, See Instructions, Use to check blood glucose daily, # 100 EA, 11 Refill(s), Pharmacy: [facility], 177.8, cm, Height/Length Dosing, 01/12/22 9:55:00 CST, 148.4, kg, Weight Dosing, 01/12/22 9:55:00 CST 1/14/22  Yes Provider, Historical   metFORMIN (GLUCOPHAGE-XR) 500 MG ER 24hr tablet Take 1 tablet (500 mg total) by mouth once daily. 1/26/23  Yes Geetha Pickens FNP   metoprolol succinate (TOPROL-XL) 50 MG 24 hr tablet Take 1 tablet (50 mg total) by mouth once daily. 3/14/23 3/13/24 Yes Christin Hartman, FNP   pravastatin (PRAVACHOL) 40 MG tablet Take 1 tablet (40 mg total) by mouth every evening. 3/14/23 3/13/24 Yes Devan Laterica L, FNP   sacubitriL-valsartan (ENTRESTO) 49-51 mg per tablet Take 1 tablet by mouth 2 (two) times daily. 3/28/23 3/27/24 Yes Devan Laterica L, FNP   sildenafiL (VIAGRA) 50 MG tablet Take 1 tablet (50 mg total) by mouth daily as needed for Erectile Dysfunction. 9/5/23 9/4/24 Yes Yossi Abreu, ALMA   XARELTO 20 mg Tab Take 1 tablet (20 mg total) by mouth once daily. 9/15/23 9/14/24 Yes Devan Laterica L FNP          Impression:   Problem List Items Addressed This Visit          Renal/    Stage 3a chronic kidney disease (Chronic)    Persistent proteinuria (Chronic)        Plan:   -CKD likely secondary to diabetic nephropathy but will obtain additional labs to rule out other causes such as MM, MGUS  -Obtain UPC, SPEP, XIANG, ANCA, Renal US, CMP, CBC    Continue following measures:  -follow 2 gram a day dietary sodium restriction  -control diabetes (goal A1c less than 7%)  -control high blood pressure (goal blood pressure is less than 130/80, please check blood pressure twice a week and bring blood pressure logs to office visit)  -exercise at least 30 minutes a day, 5 days a week  -maintain healthy weight  -decrease or stop alcohol use  -do not smoke  -stay well hydrated (drink water only, avoid juices, sweet tea, and  sodas)  -ask about staying up-to-date on vaccinations (flu vaccine, pneumonia vaccine, hepatitis B vaccine)  -avoid excessive use of NSAIDs (ibuprofen, naproxen, Aleve, Advil, Toradol, Mobic), take Tylenol as needed for headache or mild pain  -take cholesterol lowering medications if prescribed (LDL goal less than 100)    Follow-up with the primary care provider as scheduled.  Return to [facility] (kidney) clinic with labs in 5-6 weeks

## 2023-11-04 LAB
C-ANCA TITR SER IF: NEGATIVE {TITER}
P-ANCA SER QL IF: NEGATIVE

## 2023-11-05 LAB
AR ANA INTERPRETIVE COMMENT: ABNORMAL
AR ANA PATTERN: ABNORMAL
AR ANA TITER: ABNORMAL
AR ANTINUCLEAR ANTIBODY (ANA), HEP-2, IGG: DETECTED

## 2023-11-06 LAB
ALBUMIN % SPEP (OHS): 43.65
ALBUMIN SERPL-MCNC: 3.3 G/DL (ref 3.4–4.8)
ALBUMIN/GLOB SERPL: 0.8 RATIO (ref 1.1–2)
ALPHA 1 GLOB (OHS): 0.33 GM/DL
ALPHA 1 GLOB% (OHS): 4.47
ALPHA 2 GLOB % (OHS): 11.54
ALPHA 2 GLOB (OHS): 0.87 GM/DL
BETA GLOB (OHS): 1.69 GM/DL
BETA GLOB% (OHS): 22.48
GAMMA GLOBULIN % (OHS): 17.87
GAMMA GLOBULIN (OHS): 1.34 GM/DL
GLOBULIN SER-MCNC: 4.2 GM/DL (ref 2.4–3.5)
KAPPA LC FREE SER-MCNC: 4.8 MG/DL (ref 0.33–1.94)
KAPPA LC FREE/LAMBDA FREE SER: 1.53 {RATIO} (ref 0.26–1.65)
LAMBDA LC FREE SERPL-MCNC: 3.13 MG/DL (ref 0.57–2.63)
M SPIKE % (OHS): ABNORMAL
M SPIKE (OHS): ABNORMAL
PATH REV: NORMAL
PROT SERPL-MCNC: 7.5 GM/DL (ref 5.8–7.6)

## 2023-11-15 ENCOUNTER — HOSPITAL ENCOUNTER (OUTPATIENT)
Dept: RADIOLOGY | Facility: HOSPITAL | Age: 63
Discharge: HOME | End: 2023-11-15
Attending: STUDENT IN AN ORGANIZED HEALTH CARE EDUCATION/TRAINING PROGRAM
Payer: COMMERCIAL

## 2023-11-15 DIAGNOSIS — R80.1 PERSISTENT PROTEINURIA: Chronic | ICD-10-CM

## 2023-11-15 DIAGNOSIS — N18.31 STAGE 3A CHRONIC KIDNEY DISEASE: Chronic | ICD-10-CM

## 2023-11-15 PROCEDURE — 76770 US EXAM ABDO BACK WALL COMP: CPT | Mod: 59,TC

## 2023-11-22 DIAGNOSIS — N52.8 OTHER MALE ERECTILE DYSFUNCTION: Primary | ICD-10-CM

## 2023-12-11 DIAGNOSIS — I50.20 HFREF (HEART FAILURE WITH REDUCED EJECTION FRACTION): ICD-10-CM

## 2023-12-11 RX ORDER — SACUBITRIL AND VALSARTAN 49; 51 MG/1; MG/1
1 TABLET, FILM COATED ORAL 2 TIMES DAILY
Qty: 180 TABLET | Refills: 3 | Status: SHIPPED | OUTPATIENT
Start: 2023-12-11 | End: 2024-12-10

## 2023-12-15 ENCOUNTER — OFFICE VISIT (OUTPATIENT)
Dept: NEPHROLOGY | Facility: CLINIC | Age: 63
End: 2023-12-15
Payer: COMMERCIAL

## 2023-12-15 VITALS
TEMPERATURE: 98 F | BODY MASS INDEX: 45.1 KG/M2 | HEART RATE: 83 BPM | WEIGHT: 315 LBS | OXYGEN SATURATION: 98 % | DIASTOLIC BLOOD PRESSURE: 80 MMHG | RESPIRATION RATE: 18 BRPM | SYSTOLIC BLOOD PRESSURE: 150 MMHG | HEIGHT: 70 IN

## 2023-12-15 DIAGNOSIS — R80.1 PERSISTENT PROTEINURIA: ICD-10-CM

## 2023-12-15 DIAGNOSIS — N18.31 STAGE 3A CHRONIC KIDNEY DISEASE: Primary | ICD-10-CM

## 2023-12-15 DIAGNOSIS — E11.22 TYPE 2 DIABETES MELLITUS WITH STAGE 3A CHRONIC KIDNEY DISEASE, WITHOUT LONG-TERM CURRENT USE OF INSULIN: ICD-10-CM

## 2023-12-15 DIAGNOSIS — N18.31 TYPE 2 DIABETES MELLITUS WITH STAGE 3A CHRONIC KIDNEY DISEASE, WITHOUT LONG-TERM CURRENT USE OF INSULIN: ICD-10-CM

## 2023-12-15 PROCEDURE — 99215 OFFICE O/P EST HI 40 MIN: CPT | Mod: PBBFAC | Performed by: INTERNAL MEDICINE

## 2023-12-15 RX ORDER — METFORMIN HYDROCHLORIDE 500 MG/1
500 TABLET, EXTENDED RELEASE ORAL DAILY
Qty: 90 TABLET | Refills: 3 | Status: SHIPPED | OUTPATIENT
Start: 2023-12-15 | End: 2024-01-26 | Stop reason: ALTCHOICE

## 2023-12-15 NOTE — PROGRESS NOTES
[facility]    Chief Complaint   Patient presents with    Follow-up     RTC, took meds, concerned about protein in urine       History of Present Illness    This is a 63 y.o. Black or  male with past medical history of HTN, HLD, DM, CKD 3a, NICM, HFrEF (EF 40%), and persistent proteinuria, who is following up for persistent proteinuria. BP systolic  elevated at 160, repeat manually at 150.  Patient states elevation in BP likely secondary to drink coffee this morning, otherwise blood pressure check at home with systolic in the range 130s to 140s end-diastolic mostly in the 80s but can be as high as 90.  Reports medication adherence.  Keep appointment with his PCP next month.  Denies chest pain, palpitation, headache, dizziness, shortness of breath, fever, chills, nausea, vomiting, diarrhea, constipation, dysuria, hematuria, urinary frequency, urgency or retention.  Requesting for medication refill.     Review of Systems  Twelve point review of systems conducted, negative except as stated in history of present illness.    Review of patient's allergies indicates:  No Known Allergies      Past Medical History:   Past Medical History:   Diagnosis Date    Atrial flutter     Diabetes mellitus     Hypercholesteremia     Hypertension     Morbid obesity     Obese     Tobacco user        Procedure History:   Past Surgical History:   Procedure Laterality Date    ANGIOGRAM, CORONARY, WITH LEFT HEART CATHETERIZATION N/A 07/29/2022    Procedure: Angiogram, Coronary, with Left Heart Cath;  Surgeon: Yossi Zuniga MD;  Location: [facility] CATH LAB;  Service: Cardiology;  Laterality: N/A;    Cardioversion x 2      COLONOSCOPY  10/24/2023    Comprehensive electrophysiologic eval. including insertion & repositioning of multiple electrode catheters with induction or attempted induction of arrhythmias with left atrial pacing & recordingfrom coronary sinus or left atrium  07/10/2018    comprehensive  "electrophysiologic eval. insertion & repositioning multiple electrode catheters with induction or attempted induction of an arrhythmia with right atrial pacing and recording, right ventricular pacing and recording  07/10/2018    Destruction of Conduction Mechanism, Percutaneous Approach  07/10/2018       Family History: family history includes Hypertension in his mother; Valvular heart disease in his father.    Social History:  reports that he has been smoking cigars. He has been exposed to tobacco smoke. He has never used smokeless tobacco. He reports current alcohol use of about 6.0 standard drinks of alcohol per week. He reports that he does not use drugs.    Physical Exam:   BP (!) 150/80 (BP Location: Left arm, Patient Position: Sitting, BP Method: Large (Manual))   Pulse 83   Temp 98 °F (36.7 °C) (Oral)   Resp 18   Ht 5' 9.69" (1.77 m)   Wt (!) 144.2 kg (317 lb 12.8 oz)   SpO2 98%   BMI 46.01 kg/m²     General appearance: Patient is in no acute distress.  Skin: No rashes or wounds.  HEENT: PERRLA, EOMI, no scleral icterus, no JVD. Neck is supple.  Chest: Respirations are unlabored. Lungs sounds are clear.   Heart: S1, S2.   Abdomen: Benign.  : Deferred.  Extremities: No edema, peripheral pulses are palpable.   Neuro: No focal deficits.     Home Medications:  Prior to Admission medications    Medication Sig Start Date End Date Taking? Authorizing Provider   blood sugar diagnostic Strp Inject 1 strip into the skin 2 (two) times a day. 5/12/22  Yes Geetha Pickens FNP   blood-glucose meter kit   True Metrix Glucometer, See Instructions, Use to check blood glucose daily, # 1 EA, 0 Refill(s), Pharmacy: [facility], 177.8, cm, Height/Length Dosing, 01/12/22 9:55:00 CST, 148.4, kg, Weight Dosing, 01/12/22 9:55:00 CST 1/14/22  Yes Provider, Historical   EASY TOUCH TWIST LANCETS 33 gauge Misc USE AS DIRECTED TO CHECK BLOOD GLUCOSE DAILY 1/14/22  Yes Provider, Historical   empagliflozin " (JARDIANCE) 10 mg tablet Take 1 tablet (10 mg total) by mouth once daily. 9/15/23 9/14/24 Yes DevanChristin apple L, FNP   furosemide (LASIX) 40 MG tablet Take 1 tablet (40 mg total) by mouth 2 (two) times daily. 3/14/23 3/13/24 Yes Devan Laterica L, FNP   hydrALAZINE (APRESOLINE) 50 MG tablet Take 1 tablet (50 mg total) by mouth 2 (two) times daily. 3/14/23 3/13/24 Yes Devan Laterica L, FNP   KLOR-CON M20 20 mEq tablet Take 1 tablet (20 mEq total) by mouth once daily. 3/14/23 3/13/24 Yes Devan Laterica L, FNP   LANCETS MISC   True Metrix Lancets, See Instructions, Use to check blood glucose daily, # 100 EA, 11 Refill(s), Pharmacy: [facility], 177.8, cm, Height/Length Dosing, 01/12/22 9:55:00 CST, 148.4, kg, Weight Dosing, 01/12/22 9:55:00 CST 1/14/22  Yes Provider, Historical   metFORMIN (GLUCOPHAGE-XR) 500 MG ER 24hr tablet Take 1 tablet (500 mg total) by mouth once daily. 1/26/23  Yes Geetha Pickens FNP   metoprolol succinate (TOPROL-XL) 50 MG 24 hr tablet Take 1 tablet (50 mg total) by mouth once daily. 3/14/23 3/13/24 Yes Santino Hartmanica L, FNP   pravastatin (PRAVACHOL) 40 MG tablet Take 1 tablet (40 mg total) by mouth every evening. 3/14/23 3/13/24 Yes Devan Laterica L, FNP   sacubitriL-valsartan (ENTRESTO) 49-51 mg per tablet Take 1 tablet by mouth 2 (two) times daily. 12/11/23 12/10/24 Yes Devan, Laterica L, FNP   sildenafiL (VIAGRA) 50 MG tablet Take 1 tablet (50 mg total) by mouth daily as needed for Erectile Dysfunction. 9/5/23 9/4/24 Yes Yossi Abreu, NP   XARELTO 20 mg Tab Take 1 tablet (20 mg total) by mouth once daily. 9/15/23 9/14/24 Yes Christin Hartman FNP       Impression:   Problem List Items Addressed This Visit          Renal/    Stage 3a chronic kidney disease - Primary (Chronic)    Relevant Orders    Comprehensive Metabolic Panel    Persistent proteinuria (Chronic)    Relevant Orders    Comprehensive Metabolic Panel       Endocrine    Diabetes  mellitus (Chronic)    Relevant Medications    metFORMIN (GLUCOPHAGE-XR) 500 MG ER 24hr tablet    Other Relevant Orders    Comprehensive Metabolic Panel    Hemoglobin A1C       Plan:   - Obtain A1C. Continue Metformin. Recommends medication optimization pending A1c result  - previous labs including MM, MGUS, UPC, SPEP, XIANG, ANCA, Renal US unrevealing  - proteinuria likely secondary to diabetic nephropathy  - improved renal indices, Creatinine 1.29 and GFR > 60, both improved and better than baseline.      Medication review has been conducted, appropriate adjustments for decreased e-GFR have been made.     Continue following measures:  -follow 2 gram a day dietary sodium restriction  -control diabetes (goal A1c less than 7%)  -control high blood pressure (goal blood pressure is less than 130/80, please check blood pressure twice a week and bring blood pressure logs to office visit)  -exercise at least 30 minutes a day, 5 days a week  -maintain healthy weight  -decrease or stop alcohol use  -do not smoke  -stay well hydrated (drink water only, avoid juices, sweet tea, and sodas)  -ask about staying up-to-date on vaccinations (flu vaccine, pneumonia vaccine, hepatitis B vaccine)  -avoid excessive use of NSAIDs (ibuprofen, naproxen, Aleve, Advil, Toradol, Mobic), take Tylenol as needed for headache or mild pain  -take cholesterol lowering medications if prescribed (LDL goal less than 100)    Follow-up with the primary care provider as scheduled.  Return to [facility] (kidney) clinic with routine labs in 5 months     Gumaro Huynh MD, [facility] Resident, -I  [facility]

## 2024-01-26 ENCOUNTER — OFFICE VISIT (OUTPATIENT)
Dept: INTERNAL MEDICINE | Facility: CLINIC | Age: 64
End: 2024-01-26
Payer: COMMERCIAL

## 2024-01-26 VITALS
WEIGHT: 315 LBS | HEART RATE: 65 BPM | TEMPERATURE: 98 F | DIASTOLIC BLOOD PRESSURE: 81 MMHG | RESPIRATION RATE: 18 BRPM | SYSTOLIC BLOOD PRESSURE: 127 MMHG | OXYGEN SATURATION: 98 % | HEIGHT: 70 IN | BODY MASS INDEX: 45.1 KG/M2

## 2024-01-26 DIAGNOSIS — N18.31 TYPE 2 DIABETES MELLITUS WITH STAGE 3A CHRONIC KIDNEY DISEASE, WITHOUT LONG-TERM CURRENT USE OF INSULIN: Chronic | ICD-10-CM

## 2024-01-26 DIAGNOSIS — J30.1 NON-SEASONAL ALLERGIC RHINITIS DUE TO POLLEN: Chronic | ICD-10-CM

## 2024-01-26 DIAGNOSIS — Z72.0 TOBACCO USER: Chronic | ICD-10-CM

## 2024-01-26 DIAGNOSIS — E11.39 TYPE 2 DIABETES MELLITUS WITH OTHER OPHTHALMIC COMPLICATION, WITHOUT LONG-TERM CURRENT USE OF INSULIN: Primary | Chronic | ICD-10-CM

## 2024-01-26 DIAGNOSIS — N18.31 STAGE 3A CHRONIC KIDNEY DISEASE: Chronic | ICD-10-CM

## 2024-01-26 DIAGNOSIS — E66.01 CLASS 3 SEVERE OBESITY DUE TO EXCESS CALORIES WITH SERIOUS COMORBIDITY AND BODY MASS INDEX (BMI) OF 45.0 TO 49.9 IN ADULT: Chronic | ICD-10-CM

## 2024-01-26 DIAGNOSIS — G47.33 OSA ON CPAP: ICD-10-CM

## 2024-01-26 DIAGNOSIS — I10 PRIMARY HYPERTENSION: Chronic | ICD-10-CM

## 2024-01-26 DIAGNOSIS — Z23 NEED FOR VACCINATION: ICD-10-CM

## 2024-01-26 DIAGNOSIS — E11.22 TYPE 2 DIABETES MELLITUS WITH STAGE 3A CHRONIC KIDNEY DISEASE, WITHOUT LONG-TERM CURRENT USE OF INSULIN: Chronic | ICD-10-CM

## 2024-01-26 DIAGNOSIS — E78.2 MIXED HYPERLIPIDEMIA: Chronic | ICD-10-CM

## 2024-01-26 PROBLEM — E11.29 TYPE 2 DIABETES MELLITUS WITH KIDNEY COMPLICATION, WITHOUT LONG-TERM CURRENT USE OF INSULIN: Chronic | Status: ACTIVE | Noted: 2022-05-12

## 2024-01-26 PROBLEM — E66.813 CLASS 3 SEVERE OBESITY DUE TO EXCESS CALORIES WITH SERIOUS COMORBIDITY AND BODY MASS INDEX (BMI) OF 45.0 TO 49.9 IN ADULT: Chronic | Status: ACTIVE | Noted: 2024-01-26

## 2024-01-26 LAB — HBA1C MFR BLD: 5.9 %

## 2024-01-26 PROCEDURE — 1159F MED LIST DOCD IN RCRD: CPT | Mod: CPTII,,, | Performed by: NURSE PRACTITIONER

## 2024-01-26 PROCEDURE — 83036 HEMOGLOBIN GLYCOSYLATED A1C: CPT | Mod: PBBFAC | Performed by: NURSE PRACTITIONER

## 2024-01-26 PROCEDURE — 99214 OFFICE O/P EST MOD 30 MIN: CPT | Mod: PBBFAC | Performed by: NURSE PRACTITIONER

## 2024-01-26 PROCEDURE — 3074F SYST BP LT 130 MM HG: CPT | Mod: CPTII,,, | Performed by: NURSE PRACTITIONER

## 2024-01-26 PROCEDURE — 3008F BODY MASS INDEX DOCD: CPT | Mod: CPTII,,, | Performed by: NURSE PRACTITIONER

## 2024-01-26 PROCEDURE — 90715 TDAP VACCINE 7 YRS/> IM: CPT | Mod: PBBFAC

## 2024-01-26 PROCEDURE — 1101F PT FALLS ASSESS-DOCD LE1/YR: CPT | Mod: CPTII,,, | Performed by: NURSE PRACTITIONER

## 2024-01-26 PROCEDURE — 3079F DIAST BP 80-89 MM HG: CPT | Mod: CPTII,,, | Performed by: NURSE PRACTITIONER

## 2024-01-26 PROCEDURE — 90471 IMMUNIZATION ADMIN: CPT | Mod: PBBFAC

## 2024-01-26 PROCEDURE — 3288F FALL RISK ASSESSMENT DOCD: CPT | Mod: CPTII,,, | Performed by: NURSE PRACTITIONER

## 2024-01-26 PROCEDURE — 90750 HZV VACC RECOMBINANT IM: CPT | Mod: PBBFAC

## 2024-01-26 PROCEDURE — 3044F HG A1C LEVEL LT 7.0%: CPT | Mod: CPTII,,, | Performed by: NURSE PRACTITIONER

## 2024-01-26 PROCEDURE — 1160F RVW MEDS BY RX/DR IN RCRD: CPT | Mod: CPTII,,, | Performed by: NURSE PRACTITIONER

## 2024-01-26 PROCEDURE — 90472 IMMUNIZATION ADMIN EACH ADD: CPT | Mod: PBBFAC

## 2024-01-26 PROCEDURE — 99214 OFFICE O/P EST MOD 30 MIN: CPT | Mod: S$PBB,,, | Performed by: NURSE PRACTITIONER

## 2024-01-26 RX ORDER — CETIRIZINE HYDROCHLORIDE 10 MG/1
10 TABLET ORAL NIGHTLY PRN
Qty: 30 TABLET | Refills: 3 | Status: SHIPPED | OUTPATIENT
Start: 2024-01-26 | End: 2024-06-18 | Stop reason: SDUPTHER

## 2024-01-26 RX ORDER — FLUTICASONE PROPIONATE 50 MCG
1 SPRAY, SUSPENSION (ML) NASAL DAILY
Qty: 16 G | Refills: 2 | Status: SHIPPED | OUTPATIENT
Start: 2024-01-26

## 2024-01-26 RX ADMIN — ZOSTER VACCINE RECOMBINANT, ADJUVANTED 0.5 ML: KIT at 08:01

## 2024-01-26 RX ADMIN — TETANUS TOXOID, REDUCED DIPHTHERIA TOXOID AND ACELLULAR PERTUSSIS VACCINE, ADSORBED 0.5 ML: 5; 2.5; 8; 8; 2.5 SUSPENSION INTRAMUSCULAR at 08:01

## 2024-01-26 NOTE — PROGRESS NOTES
Geetha Pickens, MARIBELL   [facility]  [address] [address]      PATIENT NAME: Jamey Duke  : 1960  DATE: 24  MRN: [id number]      Reason for Visit / Chief Complaint: Follow-up       History of Present Illness / Problem Focused Workflow     Jamey Duke is a 63 y.o. Black or  male presents to the clinic for DM f/u. Medical problems include DM, HTN, HLD, h/o persistent AF/AFL, DEEP, and h/o NICMP w/multiple DCCVs -AFL recurred, macular degeneration, CKD st 3, proteinuria and ED. Followed by [facility] cardio, urology and nephrology clinics. Has optho visit in [place] on 23 (will sign release when at visit for records).     Today, pt reports continued med compliance. Pt completed colonoscopy in [place] in Nov; records requested. Reports intermittent cough x 2 months which he thinks is related to his sinuses. Worse when laying at night from drainage in throat. Pt attempts ADA diet. CBGs 70-90s when checked. Has stopped eating late and cut out sweets. Pt has completed visits with urology, cardio and nephrology since last visit; notes reviewed. Amendable to shingles and tetanus vaccines today.  Continues to smoke cigars on some days. Denies chest pain, shortness of breath, fever, headache, dizziness, weakness, abdominal pain, nausea, vomiting, diarrhea, constipation, dysuria, depression, anxiety, SI/HI.      Review of Systems     Review of Systems     See HPI for details    Constitutional: Denies Change in appetite. Denies Chills. Denies Fever. Denies Night sweats.  Eye: Denies Blurred vision. Denies Discharge. Denies Eye pain.  ENT: Denies Decreased hearing. Denies Sore throat. Denies Swollen glands.  Respiratory: Admits Cough. Denies Shortness of breath. Denies Shortness of breath with exertion. Denies Wheezing.  Cardiovascular: DeniesChest pain at rest. Denies Chest pain with exertion. Denies  Palpitations. Denies Edema.  Gastrointestinal: Denies Abdominal pain. Denies Diarrhea. Denies Nausea. Denies Vomiting. Denies Hematemesis or Hematochezia.  Genitourinary: Denies Dysuria. Denies Urinary frequency. Denies Urinary urgency. Denies Blood in urine.  Endocrine: Denies Cold intolerance. Denies Excessive thirst. Denies Heat intolerance. Denies Weight loss. Denies Weight gain.  Musculoskeletal: Denies Painful joints. Denies Weakness.  Integumentary: Denies Rash. Denies Itching. Denies Dry skin.  Neurologic: Denies Dizziness. Denies Fainting. Denies Headache.  Psychiatric: Denies Depression. Denies Anxiety. Denies Suicidal/Homicidal ideations.    All Other ROS: Negative except as stated in HPI.     Medical / Surgical / Social / Family History       ----------------------------  Atrial flutter  Diabetes mellitus  Hypercholesteremia  Hypertension  Morbid obesity  Obese  Tobacco user     Past Surgical History:   Procedure Laterality Date    ANGIOGRAM, CORONARY, WITH LEFT HEART CATHETERIZATION N/A 07/29/2022    Procedure: Angiogram, Coronary, with Left Heart Cath;  Surgeon: Yossi Zuniga MD;  Location: [facility] CATH LAB;  Service: Cardiology;  Laterality: N/A;    Cardioversion x 2      COLONOSCOPY  10/24/2023    Comprehensive electrophysiologic eval. including insertion & repositioning of multiple electrode catheters with induction or attempted induction of arrhythmias with left atrial pacing & recordingfrom coronary sinus or left atrium  07/10/2018    comprehensive electrophysiologic eval. insertion & repositioning multiple electrode catheters with induction or attempted induction of an arrhythmia with right atrial pacing and recording, right ventricular pacing and recording  07/10/2018    Destruction of Conduction Mechanism, Percutaneous Approach  07/10/2018       Social History     Socioeconomic History    Marital status: Single   Occupational History    Occupation: retired   Tobacco Use    Smoking status: Some  Days     Types: Cigars     Passive exposure: Current    Smokeless tobacco: Never    Tobacco comments:     Occasionally, last smoked 7/22/23   Substance and Sexual Activity    Alcohol use: Yes     Alcohol/week: 6.0 standard drinks of alcohol     Types: 6 Cans of beer per week     Comment: Drinks occasionally beer on weekends    Drug use: Never    Sexual activity: Yes     Partners: Female     Social Determinants of Health     Financial Resource Strain: Medium Risk (10/21/2023)    Overall Financial Resource Strain (CARDIA)     Difficulty of Paying Living Expenses: Somewhat hard   Transportation Needs: Unmet Transportation Needs (9/19/2022)    PRAPARE - Transportation     Lack of Transportation (Medical): Yes     Lack of Transportation (Non-Medical): No   Physical Activity: Insufficiently Active (10/21/2023)    Exercise Vital Sign     Days of Exercise per Week: 3 days     Minutes of Exercise per Session: 30 min   Stress: Stress Concern Present (10/21/2023)    [employer] - Occupational Stress Questionnaire     Feeling of Stress : To some extent   Social Connections: Socially Integrated (10/21/2023)    Social Connection and Isolation Panel [NHANES]     Frequency of Communication with Friends and Family: More than three times a week     Frequency of Social Gatherings with Friends and Family: More than three times a week     Attends Mandaen Services: 1 to 4 times per year     Active Member of Clubs or Organizations: Yes     Attends Club or Organization Meetings: 1 to 4 times per year     Marital Status: Living with partner   Housing Stability: Low Risk  (9/19/2022)    Housing Stability Vital Sign     Unable to Pay for Housing in the Last Year: No     Number of Places Lived in the Last Year: 1     Unstable Housing in the Last Year: No        Family History   Problem Relation Age of Onset    Hypertension Mother     Valvular heart disease Father         Medications and Allergies  "    Medications  Current Outpatient Medications   Medication Instructions    blood sugar diagnostic Strp 1 strip, Subcutaneous, 2 times daily    blood-glucose meter kit   True Metrix Glucometer, See Instructions, Use to check blood glucose daily, # 1 EA, 0 Refill(s), Pharmacy: [facility], 177.8, cm, Height/Length Dosing, 01/12/22 9:55:00 CST, 148.4, kg, Weight Dosing, 01/12/22 9:55:00 CST    cetirizine (ZYRTEC) 10 mg, Oral, Nightly PRN    EASY TOUCH TWIST LANCETS 33 gauge Misc USE AS DIRECTED TO CHECK BLOOD GLUCOSE DAILY    empagliflozin (JARDIANCE) 10 mg, Oral, Daily    fluticasone propionate (FLONASE) 50 mcg, Each Nostril, Daily    furosemide (LASIX) 40 mg, Oral, 2 times daily    hydrALAZINE (APRESOLINE) 50 mg, Oral, 2 times daily    KLOR-CON M20 20 mEq tablet 20 mEq, Oral, Daily    LANCETS MISC   True Metrix Lancets, See Instructions, Use to check blood glucose daily, # 100 EA, 11 Refill(s), Pharmacy: [facility], 177.8, cm, Height/Length Dosing, 01/12/22 9:55:00 CST, 148.4, kg, Weight Dosing, 01/12/22 9:55:00 CST    metoprolol succinate (TOPROL-XL) 50 mg, Oral, Daily    pravastatin (PRAVACHOL) 40 mg, Oral, Nightly    sacubitriL-valsartan (ENTRESTO) 49-51 mg per tablet 1 tablet, Oral, 2 times daily    sildenafiL (VIAGRA) 50 mg, Oral, Daily PRN    XARELTO 20 mg, Oral, Daily         Allergies  Review of patient's allergies indicates:  No Known Allergies    Physical Examination     /81 (BP Location: Right arm, Patient Position: Sitting, BP Method: Large (Automatic))   Pulse 65   Temp 98.4 °F (36.9 °C) (Oral)   Resp 18   Ht 5' 9.69" (1.77 m)   Wt (!) 142.9 kg (315 lb)   SpO2 98%   BMI 45.60 kg/m²     Physical Exam  Constitutional:       Appearance: He is morbidly obese.   Cardiovascular:      Rate and Rhythm: Rhythm irregularly irregular.         General: Alert and oriented, No acute distress.  Head: Normocephalic, Atraumatic.  Eye: Pupils are equal, round and " reactive to light, Extraocular movements are intact, Sclera non-icteric.  Ears/Nose/Throat: Normal, Mucosa moist,Clear.  Neck/Thyroid: Supple, Non-tender, No carotid bruit, No lymphadenopathy, No JVD, Full range of motion.  Respiratory: Clear to auscultation bilaterally; No wheezes, rales or rhonchi,Non-labored respirations, Symmetrical chest wall expansion.  Cardiovascular: S1/S2 normal, No murmurs, rubs or gallops.  Gastrointestinal: Soft, Non-tender, Non-distended, Normal bowel sounds, No palpable organomegaly.  Integumentary: Warm, Dry, Intact, No suspicious lesions or rashes.  Extremities: No clubbing, cyanosis or edema  Neurologic: No focal deficits, Cranial Nerves II-XII are grossly intact, Motor strength normal upper and lower extremities, Sensory exam intact.  Psychiatric: Normal interaction, Coherent speech, Euthymic mood, Appropriate affect       Results     Lab Results   Component Value Date    WBC 10.26 11/03/2023    HGB 15.4 11/03/2023    HCT 47.1 11/03/2023     11/03/2023    CHOL 165 07/26/2023    TRIG 131 07/26/2023    ALT 17 11/03/2023    AST 19 11/03/2023     11/03/2023    K 3.9 11/03/2023    CREATININE 1.29 (H) 11/03/2023    BUN 18.4 11/03/2023    CO2 26 11/03/2023    TSH 2.1907 05/12/2022    PSA 0.57 11/03/2023    INR 1.6 (H) 07/26/2022    HGBA1C 5.8 07/26/2023         Assessment and Plan (including Health Maintenance)     Plan:     1. Primary hypertension  At goal.  Continue entresto, metoprolol and hydralazine.  Follow a low sodium (less than 2 grams of sodium per day), DASH diet.   Continue medications as prescribed.  Monitor blood pressure and report any consistent values greater than 140/90 and keep a log.  Encouraged smoking cessation to aid in BP reduction and co-morbidities.   Maintain healthy weight with a BMI goal of <30.   Aerobic exercise for 150 minutes per week (or 5 days a week for 30 minutes each day).      2. Type 2 diabetes mellitus with stage 3a chronic kidney  disease, without long-term current use of insulin  A1C 5.9 at goal. Previous A1C 5.8.   D/C metformin.  Continue jardiance as prescribed.  Follow ADA diet.  Avoid soda, simple sweets, and limit rice/pasta/bread/starches and consume brown options when possible.   Maintain healthy weight with BMI goal <30.   Perform aerobic exercise for 150 minutes per week (or 5 days a week for 30 minutes each day).   Examine feet daily.   Obtain annual dilated eye exam.  Eye exam: 3/2023  Foot exam: 7/26/23      3. SHADI on CPAP  Reports compliance with wearing CPAP nightly.  Reports decreased EDS, snoring and fatigue.  Pt is benefiting from its use.  Expect lifetime use and decreased daytime sleepiness/fatigue.   Avoid excessive alcohol, smoking and overuse of sedatives at bedtime.  Weight management discussed.  Adjust sleep position as needed for increased comfort.      4. Need for vaccination  - varicella-zoster gE-AS01B (PF)(SHINGRIX) 50 mcg/0.5 mL injection  - Tdap (BOOSTRIX) vaccine injection 0.5 mL    5. Tobacco user  Smoking cessation discussed; total time 3 mins.   Pt ready to quit.  Declines referral to Smoking Cessation program.  Discussed benefits of quitting including improved health, decreased cardiac/vascular/pulmonary/stroke risks as well as saving money.      6. Class 3 severe obesity due to excess calories with serious comorbidity and body mass index (BMI) of 45.0 to 49.9 in adult  BMI 45. Goal BMI <30.  Aerobic exercise 150 minutes per week.  Avoid soda, simple sugars, sweets, excessive rice, pasta, potatoes or bread.   Choose brown options when available and portion control.  Limit fast foods and fried foods.   Choose complex carbs in moderation (ex: green, leafy vegetables, beans, oatmeal).  Eat plenty of fresh fruits and vegetables with lean meats daily.   Consider permanent healthy lifestyle changes.        Problem List Items Addressed This Visit          ENT    Non-seasonal allergic rhinitis due to pollen  (Chronic)       Cardiac/Vascular    Hyperlipidemia (Chronic)    Relevant Orders    Lipid Panel    Hypertension (Chronic)    Relevant Orders    CBC Auto Differential    Comprehensive Metabolic Panel    Microalbumin/Creatinine Ratio, Urine    TSH    Urinalysis, Reflex to Urine Culture       Renal/    Stage 3a chronic kidney disease (Chronic)       Endocrine    Type 2 diabetes mellitus with kidney complication, without long-term current use of insulin - Primary (Chronic)    Relevant Orders    CBC Auto Differential    Comprehensive Metabolic Panel    Hemoglobin A1C    Lipid Panel    Microalbumin/Creatinine Ratio, Urine    Urinalysis, Reflex to Urine Culture    Class 3 severe obesity due to excess calories with serious comorbidity and body mass index (BMI) of 45.0 to 49.9 in adult (Chronic)       Other    Tobacco user (Chronic)    SHADI on CPAP     Other Visit Diagnoses       Need for vaccination        Relevant Medications    varicella-zoster gE-AS01B (PF)(SHINGRIX) 50 mcg/0.5 mL injection (Completed)    Tdap (BOOSTRIX) vaccine injection 0.5 mL (Completed)              Health Maintenance Due   Topic Date Due    RSV Vaccine (Age 60+ and Pregnant patients) (1 - 1-dose 60+ series) Never done    Eye Exam  05/12/2023    COVID-19 Vaccine (3 - 2023-24 season) 09/01/2023    Abdominal Aortic Aneurysm Screening  Never done       Follow up in about 6 months (around 7/26/2024) for Follow up, Lab Results, Diabetes, HTN, HLD.        Signature:  MARIBELL Irizarry  [facility]  [address] [id number]

## 2024-02-14 ENCOUNTER — OFFICE VISIT (OUTPATIENT)
Dept: CARDIOLOGY | Facility: CLINIC | Age: 64
End: 2024-02-14
Payer: COMMERCIAL

## 2024-02-14 ENCOUNTER — TELEPHONE (OUTPATIENT)
Dept: GASTROENTEROLOGY | Facility: CLINIC | Age: 64
End: 2024-02-14
Payer: COMMERCIAL

## 2024-02-14 VITALS
TEMPERATURE: 100 F | BODY MASS INDEX: 45.53 KG/M2 | DIASTOLIC BLOOD PRESSURE: 94 MMHG | HEART RATE: 104 BPM | WEIGHT: 307.38 LBS | HEIGHT: 69 IN | RESPIRATION RATE: 20 BRPM | OXYGEN SATURATION: 99 % | SYSTOLIC BLOOD PRESSURE: 140 MMHG

## 2024-02-14 DIAGNOSIS — Z72.0 TOBACCO USER: Chronic | ICD-10-CM

## 2024-02-14 DIAGNOSIS — I48.92 ATRIAL FLUTTER, UNSPECIFIED TYPE: Chronic | ICD-10-CM

## 2024-02-14 DIAGNOSIS — I50.20 HFREF (HEART FAILURE WITH REDUCED EJECTION FRACTION): Primary | ICD-10-CM

## 2024-02-14 DIAGNOSIS — I10 PRIMARY HYPERTENSION: Chronic | ICD-10-CM

## 2024-02-14 DIAGNOSIS — G47.33 OSA ON CPAP: ICD-10-CM

## 2024-02-14 DIAGNOSIS — Z01.818 PREOPERATIVE CLEARANCE: ICD-10-CM

## 2024-02-14 DIAGNOSIS — E78.2 MIXED HYPERLIPIDEMIA: Chronic | ICD-10-CM

## 2024-02-14 LAB
OHS QRS DURATION: 76 MS
OHS QTC CALCULATION: 464 MS

## 2024-02-14 PROCEDURE — 3008F BODY MASS INDEX DOCD: CPT | Mod: CPTII,,, | Performed by: NURSE PRACTITIONER

## 2024-02-14 PROCEDURE — 99214 OFFICE O/P EST MOD 30 MIN: CPT | Mod: S$PBB,,, | Performed by: NURSE PRACTITIONER

## 2024-02-14 PROCEDURE — 3080F DIAST BP >= 90 MM HG: CPT | Mod: CPTII,,, | Performed by: NURSE PRACTITIONER

## 2024-02-14 PROCEDURE — 1101F PT FALLS ASSESS-DOCD LE1/YR: CPT | Mod: CPTII,,, | Performed by: NURSE PRACTITIONER

## 2024-02-14 PROCEDURE — 93005 ELECTROCARDIOGRAM TRACING: CPT

## 2024-02-14 PROCEDURE — 99215 OFFICE O/P EST HI 40 MIN: CPT | Mod: PBBFAC,25 | Performed by: NURSE PRACTITIONER

## 2024-02-14 PROCEDURE — 1160F RVW MEDS BY RX/DR IN RCRD: CPT | Mod: CPTII,,, | Performed by: NURSE PRACTITIONER

## 2024-02-14 PROCEDURE — 3044F HG A1C LEVEL LT 7.0%: CPT | Mod: CPTII,,, | Performed by: NURSE PRACTITIONER

## 2024-02-14 PROCEDURE — 1159F MED LIST DOCD IN RCRD: CPT | Mod: CPTII,,, | Performed by: NURSE PRACTITIONER

## 2024-02-14 PROCEDURE — 3288F FALL RISK ASSESSMENT DOCD: CPT | Mod: CPTII,,, | Performed by: NURSE PRACTITIONER

## 2024-02-14 PROCEDURE — 3077F SYST BP >= 140 MM HG: CPT | Mod: CPTII,,, | Performed by: NURSE PRACTITIONER

## 2024-02-14 NOTE — TELEPHONE ENCOUNTER
----- Message from Conchis Saldana sent at 12/5/2023  9:16 AM CST -----  Regarding: Prep [facility] 3/14/24  Moviprep OU      Pt had colonoscopy completed in [place] 11/2023

## 2024-02-14 NOTE — PROGRESS NOTES
CHIEF COMPLAINT:   Chief Complaint   Patient presents with    f/u denies chest pain has JOSHI has been haing sinus problems                   Review of patient's allergies indicates:  No Known Allergies                                       HPI:  Jamey Duke 63 y.o. male with  NICMO/HFrEF (EF-40% per ECHO 1.30.23), h/o persistent AF/AFL (s/p multiple attempted DCCVs but has had recurrence of AFL) , Carotid artery stenosis, HTN, HLD, DM II, SHADI/CPAP, and tobacco use (cigars)  who presents to cardiology clinic for routine follow up and ongoing care.  He is a former patient of Dr. Gutierrez and Dr. Syed.  Patient completed a Lexiscan stress test on 6.30.22 that was abnormal revealing moderate to severe intensity, moderate to large sized, reversible perfusion abnormality that is consistent with ischemia in the typical distribution of the LCX territory and a small to moderate sized, moderate intensity, reversible perfusion abnormality that is consistent with ischemia in the typical distribution of the LAD territory.  He underwent a subsequent LHC on 7.29.22  that revealed normal coronary arteries. Latest Echocardiogram obtained on 1.30.23 revealed an ejection fraction of 40% which is improved from previous EF of 35% per ECHO on 5.27.22. (see report below).     Patient presents to clinic today reporting that he feels well from a cardiac standpoint.  He denies any chest pain, orthopnea, PND, palpitations, peripheral edema, claudication, lightheadedness, dizziness, near-syncope or syncope.  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  He endorses compliance with his current medications and denies any adverse bleeding issues with Xarelto.  He endorses nightly compliance with CPAP and feels he is benefitting from use.  The  patient's main complaint today is sinus pressure/headache issues but will follow up with PCP or urgent care for further evaluation.  He is also requesting cardiac risk stratification for an upcoming dental procedure.      PMH: HTN, HLD, h/o persistent AF/AFL, and h/o NICMP  PSH: ablation  Social History: Denies any illicit drug, tobacco use-smokes occasional cigar . Reports occasional ETOH use  Family History: Father -leaky valve    CARDIAC TESTING:    ECHO 1.30.23  The left ventricle is normal in size with mildly decreased systolic function.  Normal right ventricular size with normal right ventricular systolic function.  The estimated ejection fraction is 40%.  Moderate right atrial enlargement.  Mild mitral regurgitation.  Atrial fibrillation observed.  There is mild to moderate pulmonary hypertension.  Moderate left atrial enlargement.    Cardiac catheterization 7.29.22    Conclusion  · The pre-procedure left ventricular end diastolic pressure was 22.  · The estimated blood loss was none.  · The coronary arteries were normal..    FINDINGS  The patient has No CAD  Blood loss:  less than 15 cc.    RECOMMENDATIONS  Medical management -- maximize GDMT  Risk factor modifications -- smoking cessation  Activity -- avoid straining with affected limb for one week  Exercise on regular basis.    Lexiscan stress test 6.20.22       Abnormal myocardial perfusion scan.    There are two significant perfusion abnormalities.    Perfusion Abnormality #1 - There is a moderate to severe intensity, moderate to large sized, reversible perfusion abnormality that is consistent with ischemia in the basal to apical lateral apical wall(s) in the typical distribution of the LCX territory.    Perfusion Abnormality #2 - There is a small to moderate sized, moderate intensity, reversible perfusion abnormality that is consistent with ischemia in the basal to mid anterior wall(s) in the typical distribution of the LAD territory.    There are no  other significant perfusion abnormalities.    The gated perfusion images showed an ejection fraction of 36% at rest. The gated perfusion images showed an ejection fraction of 33% post stress.    The EKG portion of this study is abnormal but not diagnostic.    The patient reported no chest pain during the stress test.    There were no arrhythmias during stress.     If clinically indicated, consider further evaluation with coronary angiogram.       ECHO 5.27.22     The left ventricle is normal in size with moderately decreased systolic function.  The estimated ejection fraction is 35%.  Atrial fibrillation observed.  Mild mitral regurgitation.  Mild tricuspid regurgitation.  IVC is dilated.IVC collapses >50%.  Intermediate central venous pressure (8 mmHg).  The estimated PA systolic pressure is 37 mmHg.  Normal right ventricular size with normal right ventricular systolic function.  There is no pulmonary hypertension.       ECHO 1.24.20    1. The study quality is below average.  2. The left ventricle is normal in size. Global left ventricular systolic function is mildly decreased. The left ventricular ejection fraction is 45%.  3. The left atrial diameter is mildly increased. The right atrium is mildly enlarged.  4. Mild (1+) mitral regurgitation. Mild (1+) tricuspid regurgitation.  5. The pulmonary artery systolic pressure is 35 mmHg.     PET 12.6.18   This is a normal perfusion study, no perfusion defects noted.   This scan is suggestive of low risk for future cardiovascular events.   The left ventricular cavity is noted to be moderately enlarged on the stress studies. The stress left ventricular ejection fraction was calculated to be 45% and left ventricular global function is mildly reduced. The rest left ventricular cavity is noted to be mildly enlarged. The rest left ventricular ejection fraction was calculated to be 39% and rest left ventricular global function is moderately reduced.   When compared to the  resting ejection fraction (39%), the stress ejection fraction (45%) has increased.   The study quality is good.    Carotid US 2.9.18  1. The study quality is below average.  2. 1-39% stenosis in the proximal right internal carotid artery based on Bluth Criteria.  3. Antegrade right vertebral artery flow.  4. Antegrade left vertebral artery flow    [facility] 1.3.17  EF -20%  Normal coronaries                                                                                                                                                                                                                                                                                                      Patient Active Problem List   Diagnosis    Atrial flutter    Hyperlipidemia    Hypertension    Type 2 diabetes mellitus with kidney complication, without long-term current use of insulin    Tobacco user    Subclinical hyperthyroidism    Other male erectile dysfunction    Acute systolic heart failure    AMD (age-related macular degeneration), bilateral    Stage 3a chronic kidney disease    Persistent proteinuria    Chronic pain of both knees    SHADI on CPAP    Class 3 severe obesity due to excess calories with serious comorbidity and body mass index (BMI) of 45.0 to 49.9 in adult    Non-seasonal allergic rhinitis due to pollen     Past Surgical History:   Procedure Laterality Date    ANGIOGRAM, CORONARY, WITH LEFT HEART CATHETERIZATION N/A 07/29/2022    Procedure: Angiogram, Coronary, with Left Heart Cath;  Surgeon: Yossi Zuniga MD;  Location: [facility] CATH LAB;  Service: Cardiology;  Laterality: N/A;    Cardioversion x 2      COLONOSCOPY  10/24/2023    Comprehensive electrophysiologic eval. including insertion & repositioning of multiple electrode catheters with induction or attempted induction of arrhythmias with left atrial pacing & recordingfrom coronary sinus or left atrium  07/10/2018    comprehensive electrophysiologic eval. insertion &  repositioning multiple electrode catheters with induction or attempted induction of an arrhythmia with right atrial pacing and recording, right ventricular pacing and recording  07/10/2018    Destruction of Conduction Mechanism, Percutaneous Approach  07/10/2018     Social History     Socioeconomic History    Marital status: Single   Occupational History    Occupation: retired   Tobacco Use    Smoking status: Some Days     Types: Cigars     Passive exposure: Current    Smokeless tobacco: Never    Tobacco comments:     Occasionally, last smoked 7/22/23   Substance and Sexual Activity    Alcohol use: Yes     Alcohol/week: 6.0 standard drinks of alcohol     Types: 6 Cans of beer per week     Comment: Drinks occasionally beer on weekends    Drug use: Never    Sexual activity: Yes     Partners: Female     Social Determinants of Health     Financial Resource Strain: Medium Risk (10/21/2023)    Overall Financial Resource Strain (CARDIA)     Difficulty of Paying Living Expenses: Somewhat hard   Transportation Needs: Unmet Transportation Needs (9/19/2022)    PRAPARE - Transportation     Lack of Transportation (Medical): Yes     Lack of Transportation (Non-Medical): No   Physical Activity: Insufficiently Active (10/21/2023)    Exercise Vital Sign     Days of Exercise per Week: 3 days     Minutes of Exercise per Session: 30 min   Stress: Stress Concern Present (10/21/2023)    [employer] [place] [employer] - Occupational Stress Questionnaire     Feeling of Stress : To some extent   Social Connections: Socially Integrated (10/21/2023)    Social Connection and Isolation Panel [NHANES]     Frequency of Communication with Friends and Family: More than three times a week     Frequency of Social Gatherings with Friends and Family: More than three times a week     Attends Anabaptism Services: 1 to 4 times per year     Active Member of Clubs or Organizations: Yes     Attends Club or Organization Meetings: 1 to 4 times per  year     Marital Status: Living with partner   Housing Stability: Low Risk  (9/19/2022)    Housing Stability Vital Sign     Unable to Pay for Housing in the Last Year: No     Number of Places Lived in the Last Year: 1     Unstable Housing in the Last Year: No        Family History   Problem Relation Age of Onset    Hypertension Mother     Valvular heart disease Father          Current Outpatient Medications:     cetirizine (ZYRTEC) 10 MG tablet, Take 1 tablet (10 mg total) by mouth nightly as needed for Allergies or Rhinitis., Disp: 30 tablet, Rfl: 3    empagliflozin (JARDIANCE) 10 mg tablet, Take 1 tablet (10 mg total) by mouth once daily., Disp: 30 tablet, Rfl: 11    fluticasone propionate (FLONASE) 50 mcg/actuation nasal spray, 1 spray (50 mcg total) by Each Nostril route once daily., Disp: 16 g, Rfl: 2    furosemide (LASIX) 40 MG tablet, Take 1 tablet (40 mg total) by mouth 2 (two) times daily., Disp: 180 tablet, Rfl: 3    hydrALAZINE (APRESOLINE) 50 MG tablet, Take 1 tablet (50 mg total) by mouth 2 (two) times daily., Disp: 180 tablet, Rfl: 3    KLOR-CON M20 20 mEq tablet, Take 1 tablet (20 mEq total) by mouth once daily., Disp: 90 tablet, Rfl: 3    metoprolol succinate (TOPROL-XL) 50 MG 24 hr tablet, Take 1 tablet (50 mg total) by mouth once daily., Disp: 90 tablet, Rfl: 3    pravastatin (PRAVACHOL) 40 MG tablet, Take 1 tablet (40 mg total) by mouth every evening., Disp: 90 tablet, Rfl: 3    sacubitriL-valsartan (ENTRESTO) 49-51 mg per tablet, Take 1 tablet by mouth 2 (two) times daily., Disp: 180 tablet, Rfl: 3    XARELTO 20 mg Tab, Take 1 tablet (20 mg total) by mouth once daily., Disp: 90 tablet, Rfl: 3    blood sugar diagnostic Strp, Inject 1 strip into the skin 2 (two) times a day., Disp: 100 strip, Rfl: 11    blood-glucose meter kit,  True Metrix Glucometer, See Instructions, Use to check blood glucose daily, # 1 EA, 0 Refill(s), Pharmacy: [facility], 177.8, cm, Height/Length Dosing,  "01/12/22 9:55:00 CST, 148.4, kg, Weight Dosing, 01/12/22 9:55:00 CST, Disp: , Rfl:     EASY TOUCH TWIST LANCETS 33 gauge Misc, USE AS DIRECTED TO CHECK BLOOD GLUCOSE DAILY, Disp: , Rfl:     LANCETS MISC,  True Metrix Lancets, See Instructions, Use to check blood glucose daily, # 100 EA, 11 Refill(s), Pharmacy: [facility], 177.8, cm, Height/Length Dosing, 01/12/22 9:55:00 CST, 148.4, kg, Weight Dosing, 01/12/22 9:55:00 CST, Disp: , Rfl:     sildenafiL (VIAGRA) 50 MG tablet, Take 1 tablet (50 mg total) by mouth daily as needed for Erectile Dysfunction. (Patient not taking: Reported on 2/14/2024), Disp: 30 tablet, Rfl: 11     ROS:                                                                                                                                                                             Review of Systems   Constitutional: Negative.    HENT: Negative.     Eyes: Negative.    Respiratory:  Positive for shortness of breath.    Cardiovascular: Negative.    Gastrointestinal: Negative.    Genitourinary: Negative.    Musculoskeletal: Negative.    Skin: Negative.    Neurological: Negative.    Endo/Heme/Allergies: Negative.    Psychiatric/Behavioral: Negative.          Blood pressure (!) 140/94, pulse 104, temperature 99.9 °F (37.7 °C), temperature source Oral, resp. rate 20, height 5' 9" (1.753 m), weight (!) 139.4 kg (307 lb 6.4 oz), SpO2 99 %.   PE:  Physical Exam  Constitutional:       Appearance: Normal appearance.   HENT:      Head: Normocephalic.   Eyes:      Pupils: Pupils are equal, round, and reactive to light.   Cardiovascular:      Rate and Rhythm: Normal rate and regular rhythm.      Pulses: Normal pulses.   Pulmonary:      Effort: Pulmonary effort is normal.   Musculoskeletal:         General: Normal range of motion.   Skin:     General: Skin is warm and dry.   Neurological:      General: No focal deficit present.      Mental Status: He is alert and oriented to person, place, and " time.   Psychiatric:         Mood and Affect: Mood normal.         Behavior: Behavior normal.                ASSESSMENT/PLAN:  NICMP/HFrEF NYHA Class I- II  - EF - 40% per ECHO 1.30.23 improved from -->35% per ECHO 5.27.22 --> 45% per ECHO (1.24.20)  - [facility]- normal coronaries (7.29.22)  - Lexiscan  stress test - to reversible abnormalities ischemic defects (6.20.22)  - [facility]- EF-20%, Normal coronaries (1.3.17)  - Reports occasional shortness of breath with over exertion but able to perform ADLs and regular activities without issue.    - Euvolemic upon exam, warm and dry   - Continue GDMT: Toprol succinate 50 mg daily, Entresto- 49/51 mg BID and Jardiance 10 mg   - Continue Lasix 40 mg BID  - Counseled patient on low-sodium diet   - Will plan to repeat ECHO after optimization of GDMT.     H/o persistent AF/AFL Status post RFA on (7.10.18)   - Has multiple attempted DCCVs but has had recurrence of AFL  - Denies palpitations  - Continue Toprol succinate 50 qd and Xarelto 20 mg p.o. daily. Denies any adverse bleeding issues    - EKG today - AFIB     HTN- BP above goal, but normally better controlled. Patient did not take all of his morning medications  - Continue Entresto 46/51 mg BID, metoprolol succinate 50 mg, lasix 40 mg BID, hydralazine 50 mg BID  - Nurse visit in 2-3 weeks for BP check. If BP remains above goal,will consider increasing Entresto to max dose, Will need to monitor renal indices closely   - Counseled on low-sodium diet     Diabetes Mellitus Type II   - A1C- 5.9  - Continue management per PCP     HLD- LDL -100, above goal <70/DM   - Continue Pravastatin 40 mg po daily.  - Counseled patient on low-cholesterol, low-fat diet, and encourage exercise as tolerated    Carotid Artery disease- Asymptomatic   - 1-39% stenosis in the proximal right internal carotid artery based on Bluth Criteria. (2018)    Tobacco use  - Reports occasional cigar use.  - Counseled on importance of smoking cessation    SHADI/CPAP  -  Reports nightly compliance with CPAP    Cardiac Risk Stratification   According to the Revised cardiac Risk Index, the pateint is considered low to intermediate risk for cardiac events for low risk dental procedure.  The patient is low to intermediate risk to hold Xarelto for 72 hours prior to procedure.  Recommend to resume postprocedure when safe from a surgical standpoint.  Recommend to continue with beta-blocker during perioperative period and monitor for any Atrial fibrillation with RVR.    EKG  Nurse visit in 2-3 weeks for BP check  Follow-up in cardiology clinic in 4 months or sooner if needed   Follow up with PCP as directed

## 2024-02-14 NOTE — PATIENT INSTRUCTIONS
EKG  Nurse visit in 2-3 weeks for BP check  Follow-up in cardiology clinic in 4 months or sooner if needed   Follow up with PCP as directed

## 2024-04-02 DIAGNOSIS — E78.2 MIXED HYPERLIPIDEMIA: ICD-10-CM

## 2024-04-02 DIAGNOSIS — I10 PRIMARY HYPERTENSION: ICD-10-CM

## 2024-04-02 DIAGNOSIS — I48.0 PAROXYSMAL ATRIAL FIBRILLATION: ICD-10-CM

## 2024-04-02 DIAGNOSIS — R06.09 DOE (DYSPNEA ON EXERTION): ICD-10-CM

## 2024-04-02 RX ORDER — POTASSIUM CHLORIDE 20 MEQ/1
20 TABLET, EXTENDED RELEASE ORAL 2 TIMES DAILY
Qty: 90 TABLET | Refills: 3 | Status: SHIPPED | OUTPATIENT
Start: 2024-04-02

## 2024-04-02 RX ORDER — FUROSEMIDE 40 MG/1
40 TABLET ORAL 2 TIMES DAILY
Qty: 180 TABLET | Refills: 3 | Status: ON HOLD | OUTPATIENT
Start: 2024-04-02 | End: 2024-06-14 | Stop reason: HOSPADM

## 2024-04-02 RX ORDER — PRAVASTATIN SODIUM 40 MG/1
40 TABLET ORAL NIGHTLY
Qty: 90 TABLET | Refills: 3 | Status: SHIPPED | OUTPATIENT
Start: 2024-04-02 | End: 2025-04-02

## 2024-04-02 RX ORDER — HYDRALAZINE HYDROCHLORIDE 50 MG/1
50 TABLET, FILM COATED ORAL 2 TIMES DAILY
Qty: 180 TABLET | Refills: 3 | Status: SHIPPED | OUTPATIENT
Start: 2024-04-02 | End: 2025-04-02

## 2024-04-02 RX ORDER — METOPROLOL SUCCINATE 50 MG/1
50 TABLET, EXTENDED RELEASE ORAL DAILY
Qty: 90 TABLET | Refills: 3 | Status: ON HOLD | OUTPATIENT
Start: 2024-04-02 | End: 2024-04-26 | Stop reason: HOSPADM

## 2024-04-02 NOTE — TELEPHONE ENCOUNTER
Patient called stating he's needing a refill on the following meds:  Lasix  Appresoline  Klor-Con M20  Metoprolol Succ  Pravastatin      NOV: 04/24/2024 (Nurse visit)  NOV: 06/14/2024

## 2024-04-02 NOTE — TELEPHONE ENCOUNTER
KLOR-CON M20 20 mEq tablet, Take 1 tablet (20 mEq total) by mouth once daily., Disp: 90 tablet, Rfl: 3  Klor-Con was in patient's last clinic note as a medication but it is not on his list.   LCV 2/14/24

## 2024-04-24 ENCOUNTER — HOSPITAL ENCOUNTER (INPATIENT)
Facility: HOSPITAL | Age: 64
LOS: 1 days | Discharge: HOME HEALTH | DRG: 291 | End: 2024-04-26
Attending: EMERGENCY MEDICINE | Admitting: INTERNAL MEDICINE
Payer: COMMERCIAL

## 2024-04-24 DIAGNOSIS — I50.9 CHF (CONGESTIVE HEART FAILURE): ICD-10-CM

## 2024-04-24 DIAGNOSIS — R06.02 SOB (SHORTNESS OF BREATH): ICD-10-CM

## 2024-04-24 DIAGNOSIS — I48.91 ATRIAL FIBRILLATION WITH RVR: ICD-10-CM

## 2024-04-24 DIAGNOSIS — J90 LOCULATED PLEURAL EFFUSION: ICD-10-CM

## 2024-04-24 DIAGNOSIS — R07.9 CHEST PAIN: ICD-10-CM

## 2024-04-24 DIAGNOSIS — I50.9 ACUTE ON CHRONIC CONGESTIVE HEART FAILURE, UNSPECIFIED HEART FAILURE TYPE: Primary | ICD-10-CM

## 2024-04-24 DIAGNOSIS — N17.9 AKI (ACUTE KIDNEY INJURY): ICD-10-CM

## 2024-04-24 DIAGNOSIS — Z87.898 HISTORY OF ALCOHOL USE: ICD-10-CM

## 2024-04-24 DIAGNOSIS — G47.33 OSA ON CPAP: ICD-10-CM

## 2024-04-24 LAB
ALBUMIN SERPL-MCNC: 3.2 G/DL (ref 3.4–4.8)
ALBUMIN/GLOB SERPL: 0.8 RATIO (ref 1.1–2)
ALP SERPL-CCNC: 98 UNIT/L (ref 40–150)
ALT SERPL-CCNC: 16 UNIT/L (ref 0–55)
ANION GAP SERPL CALC-SCNC: 8 MEQ/L
AST SERPL-CCNC: 19 UNIT/L (ref 5–34)
BASOPHILS # BLD AUTO: 0.03 X10(3)/MCL
BASOPHILS NFR BLD AUTO: 0.2 %
BILIRUB SERPL-MCNC: 1.2 MG/DL
BNP BLD-MCNC: 2168.1 PG/ML
BUN SERPL-MCNC: 15.8 MG/DL (ref 8.4–25.7)
BUN SERPL-MCNC: 19.2 MG/DL (ref 8.4–25.7)
CALCIUM SERPL-MCNC: 9.3 MG/DL (ref 8.8–10)
CALCIUM SERPL-MCNC: 9.5 MG/DL (ref 8.8–10)
CHLORIDE SERPL-SCNC: 108 MMOL/L (ref 98–107)
CHLORIDE SERPL-SCNC: 110 MMOL/L (ref 98–107)
CO2 SERPL-SCNC: 19 MMOL/L (ref 23–31)
CO2 SERPL-SCNC: 23 MMOL/L (ref 23–31)
CREAT SERPL-MCNC: 1.44 MG/DL (ref 0.73–1.18)
CREAT SERPL-MCNC: 1.48 MG/DL (ref 0.73–1.18)
CREAT/UREA NIT SERPL: 13
EOSINOPHIL # BLD AUTO: 0.01 X10(3)/MCL (ref 0–0.9)
EOSINOPHIL NFR BLD AUTO: 0.1 %
ERYTHROCYTE [DISTWIDTH] IN BLOOD BY AUTOMATED COUNT: 16.7 % (ref 11.5–17)
FLUAV AG UPPER RESP QL IA.RAPID: NOT DETECTED
FLUBV AG UPPER RESP QL IA.RAPID: NOT DETECTED
GFR SERPLBLD CREATININE-BSD FMLA CKD-EPI: 52 MLS/MIN/1.73/M2
GFR SERPLBLD CREATININE-BSD FMLA CKD-EPI: 54 MLS/MIN/1.73/M2
GLOBULIN SER-MCNC: 4.2 GM/DL (ref 2.4–3.5)
GLUCOSE SERPL-MCNC: 125 MG/DL (ref 82–115)
GLUCOSE SERPL-MCNC: 153 MG/DL (ref 82–115)
HCT VFR BLD AUTO: 41.1 % (ref 42–52)
HGB BLD-MCNC: 14 G/DL (ref 14–18)
HOLD SPECIMEN: NORMAL
IMM GRANULOCYTES # BLD AUTO: 0.07 X10(3)/MCL (ref 0–0.04)
IMM GRANULOCYTES NFR BLD AUTO: 0.5 %
LYMPHOCYTES # BLD AUTO: 1.33 X10(3)/MCL (ref 0.6–4.6)
LYMPHOCYTES NFR BLD AUTO: 9.4 %
MAGNESIUM SERPL-MCNC: 1.9 MG/DL (ref 1.6–2.6)
MCH RBC QN AUTO: 30.6 PG (ref 27–31)
MCHC RBC AUTO-ENTMCNC: 34.1 G/DL (ref 33–36)
MCV RBC AUTO: 89.7 FL (ref 80–94)
MONOCYTES # BLD AUTO: 0.91 X10(3)/MCL (ref 0.1–1.3)
MONOCYTES NFR BLD AUTO: 6.4 %
NEUTROPHILS # BLD AUTO: 11.81 X10(3)/MCL (ref 2.1–9.2)
NEUTROPHILS NFR BLD AUTO: 83.4 %
NRBC BLD AUTO-RTO: 0 %
OHS QRS DURATION: 78 MS
OHS QTC CALCULATION: 471 MS
PLATELET # BLD AUTO: 220 X10(3)/MCL (ref 130–400)
PMV BLD AUTO: 12.9 FL (ref 7.4–10.4)
POCT GLUCOSE: 107 MG/DL (ref 70–110)
POCT GLUCOSE: 96 MG/DL (ref 70–110)
POCT GLUCOSE: 99 MG/DL (ref 70–110)
POTASSIUM SERPL-SCNC: 4.2 MMOL/L (ref 3.5–5.1)
POTASSIUM SERPL-SCNC: 4.3 MMOL/L (ref 3.5–5.1)
PROT SERPL-MCNC: 7.4 GM/DL (ref 5.8–7.6)
RBC # BLD AUTO: 4.58 X10(6)/MCL (ref 4.7–6.1)
RSV A 5' UTR RNA NPH QL NAA+PROBE: NOT DETECTED
SARS-COV-2 RNA RESP QL NAA+PROBE: NOT DETECTED
SODIUM SERPL-SCNC: 139 MMOL/L (ref 136–145)
SODIUM SERPL-SCNC: 139 MMOL/L (ref 136–145)
TROPONIN I SERPL-MCNC: 0.03 NG/ML (ref 0–0.04)
WBC # SPEC AUTO: 14.16 X10(3)/MCL (ref 4.5–11.5)

## 2024-04-24 PROCEDURE — 27000190 HC CPAP FULL FACE MASK W/VALVE

## 2024-04-24 PROCEDURE — 63600175 PHARM REV CODE 636 W HCPCS: Performed by: STUDENT IN AN ORGANIZED HEALTH CARE EDUCATION/TRAINING PROGRAM

## 2024-04-24 PROCEDURE — 96374 THER/PROPH/DIAG INJ IV PUSH: CPT

## 2024-04-24 PROCEDURE — 93005 ELECTROCARDIOGRAM TRACING: CPT

## 2024-04-24 PROCEDURE — 99285 EMERGENCY DEPT VISIT HI MDM: CPT | Mod: 25

## 2024-04-24 PROCEDURE — 25000003 PHARM REV CODE 250: Performed by: INTERNAL MEDICINE

## 2024-04-24 PROCEDURE — 94761 N-INVAS EAR/PLS OXIMETRY MLT: CPT

## 2024-04-24 PROCEDURE — 0241U COVID/RSV/FLU A&B PCR: CPT | Performed by: STUDENT IN AN ORGANIZED HEALTH CARE EDUCATION/TRAINING PROGRAM

## 2024-04-24 PROCEDURE — 63600175 PHARM REV CODE 636 W HCPCS: Performed by: INTERNAL MEDICINE

## 2024-04-24 PROCEDURE — 83735 ASSAY OF MAGNESIUM: CPT | Performed by: INTERNAL MEDICINE

## 2024-04-24 PROCEDURE — 85025 COMPLETE CBC W/AUTO DIFF WBC: CPT | Performed by: NURSE PRACTITIONER

## 2024-04-24 PROCEDURE — 25000003 PHARM REV CODE 250

## 2024-04-24 PROCEDURE — 5A09357 ASSISTANCE WITH RESPIRATORY VENTILATION, LESS THAN 24 CONSECUTIVE HOURS, CONTINUOUS POSITIVE AIRWAY PRESSURE: ICD-10-PCS | Performed by: INTERNAL MEDICINE

## 2024-04-24 PROCEDURE — G0378 HOSPITAL OBSERVATION PER HR: HCPCS

## 2024-04-24 PROCEDURE — 96361 HYDRATE IV INFUSION ADD-ON: CPT

## 2024-04-24 PROCEDURE — 94660 CPAP INITIATION&MGMT: CPT

## 2024-04-24 PROCEDURE — 96375 TX/PRO/DX INJ NEW DRUG ADDON: CPT

## 2024-04-24 PROCEDURE — 63600175 PHARM REV CODE 636 W HCPCS

## 2024-04-24 PROCEDURE — 80053 COMPREHEN METABOLIC PANEL: CPT | Performed by: NURSE PRACTITIONER

## 2024-04-24 PROCEDURE — 25000003 PHARM REV CODE 250: Performed by: STUDENT IN AN ORGANIZED HEALTH CARE EDUCATION/TRAINING PROGRAM

## 2024-04-24 PROCEDURE — 99900035 HC TECH TIME PER 15 MIN (STAT)

## 2024-04-24 PROCEDURE — 82962 GLUCOSE BLOOD TEST: CPT

## 2024-04-24 PROCEDURE — 27000221 HC OXYGEN, UP TO 24 HOURS

## 2024-04-24 PROCEDURE — 83880 ASSAY OF NATRIURETIC PEPTIDE: CPT | Performed by: NURSE PRACTITIONER

## 2024-04-24 PROCEDURE — 25000003 PHARM REV CODE 250: Performed by: NURSE PRACTITIONER

## 2024-04-24 PROCEDURE — 84484 ASSAY OF TROPONIN QUANT: CPT | Performed by: NURSE PRACTITIONER

## 2024-04-24 RX ORDER — FUROSEMIDE 10 MG/ML
40 INJECTION INTRAMUSCULAR; INTRAVENOUS
Status: COMPLETED | OUTPATIENT
Start: 2024-04-24 | End: 2024-04-24

## 2024-04-24 RX ORDER — NALOXONE HCL 0.4 MG/ML
0.02 VIAL (ML) INJECTION
Status: DISCONTINUED | OUTPATIENT
Start: 2024-04-24 | End: 2024-04-26 | Stop reason: HOSPADM

## 2024-04-24 RX ORDER — GLUCAGON 1 MG
1 KIT INJECTION
Status: DISCONTINUED | OUTPATIENT
Start: 2024-04-24 | End: 2024-04-26 | Stop reason: HOSPADM

## 2024-04-24 RX ORDER — NITROGLYCERIN 0.4 MG/1
0.4 TABLET SUBLINGUAL EVERY 5 MIN PRN
Status: DISCONTINUED | OUTPATIENT
Start: 2024-04-24 | End: 2024-04-26 | Stop reason: HOSPADM

## 2024-04-24 RX ORDER — METOPROLOL SUCCINATE 50 MG/1
50 TABLET, EXTENDED RELEASE ORAL DAILY
Status: DISCONTINUED | OUTPATIENT
Start: 2024-04-24 | End: 2024-04-25

## 2024-04-24 RX ORDER — INSULIN ASPART 100 [IU]/ML
0-5 INJECTION, SOLUTION INTRAVENOUS; SUBCUTANEOUS
Status: DISCONTINUED | OUTPATIENT
Start: 2024-04-24 | End: 2024-04-26 | Stop reason: HOSPADM

## 2024-04-24 RX ORDER — LOSARTAN POTASSIUM 25 MG/1
25 TABLET ORAL DAILY
Status: DISCONTINUED | OUTPATIENT
Start: 2024-04-24 | End: 2024-04-24

## 2024-04-24 RX ORDER — IBUPROFEN 200 MG
24 TABLET ORAL
Status: DISCONTINUED | OUTPATIENT
Start: 2024-04-24 | End: 2024-04-26 | Stop reason: HOSPADM

## 2024-04-24 RX ORDER — SODIUM CHLORIDE 0.9 % (FLUSH) 0.9 %
10 SYRINGE (ML) INJECTION EVERY 12 HOURS PRN
Status: DISCONTINUED | OUTPATIENT
Start: 2024-04-24 | End: 2024-04-26 | Stop reason: HOSPADM

## 2024-04-24 RX ORDER — ONDANSETRON HYDROCHLORIDE 2 MG/ML
4 INJECTION, SOLUTION INTRAVENOUS EVERY 8 HOURS PRN
Status: DISCONTINUED | OUTPATIENT
Start: 2024-04-24 | End: 2024-04-26 | Stop reason: HOSPADM

## 2024-04-24 RX ORDER — FUROSEMIDE 10 MG/ML
40 INJECTION INTRAMUSCULAR; INTRAVENOUS EVERY 12 HOURS
Status: DISCONTINUED | OUTPATIENT
Start: 2024-04-24 | End: 2024-04-24

## 2024-04-24 RX ORDER — ACETAMINOPHEN 325 MG/1
650 TABLET ORAL EVERY 8 HOURS PRN
Status: DISCONTINUED | OUTPATIENT
Start: 2024-04-24 | End: 2024-04-26 | Stop reason: HOSPADM

## 2024-04-24 RX ORDER — VERAPAMIL HYDROCHLORIDE 2.5 MG/ML
5 INJECTION, SOLUTION INTRAVENOUS ONCE
Status: COMPLETED | OUTPATIENT
Start: 2024-04-24 | End: 2024-04-24

## 2024-04-24 RX ORDER — FUROSEMIDE 10 MG/ML
40 INJECTION INTRAMUSCULAR; INTRAVENOUS EVERY 12 HOURS
Status: DISCONTINUED | OUTPATIENT
Start: 2024-04-24 | End: 2024-04-26

## 2024-04-24 RX ORDER — HYDRALAZINE HYDROCHLORIDE 20 MG/ML
10 INJECTION INTRAMUSCULAR; INTRAVENOUS EVERY 4 HOURS PRN
Status: DISCONTINUED | OUTPATIENT
Start: 2024-04-24 | End: 2024-04-26 | Stop reason: HOSPADM

## 2024-04-24 RX ORDER — IBUPROFEN 200 MG
16 TABLET ORAL
Status: DISCONTINUED | OUTPATIENT
Start: 2024-04-24 | End: 2024-04-26 | Stop reason: HOSPADM

## 2024-04-24 RX ORDER — FUROSEMIDE 10 MG/ML
40 INJECTION INTRAMUSCULAR; INTRAVENOUS EVERY 12 HOURS
Status: DISCONTINUED | OUTPATIENT
Start: 2024-04-25 | End: 2024-04-24

## 2024-04-24 RX ORDER — ATORVASTATIN CALCIUM 20 MG/1
20 TABLET, FILM COATED ORAL NIGHTLY
Status: DISCONTINUED | OUTPATIENT
Start: 2024-04-24 | End: 2024-04-26 | Stop reason: HOSPADM

## 2024-04-24 RX ORDER — LABETALOL HCL 20 MG/4 ML
10 SYRINGE (ML) INTRAVENOUS EVERY 4 HOURS PRN
Status: DISCONTINUED | OUTPATIENT
Start: 2024-04-24 | End: 2024-04-26 | Stop reason: HOSPADM

## 2024-04-24 RX ADMIN — NITROGLYCERIN 1 INCH: 20 OINTMENT TOPICAL at 07:04

## 2024-04-24 RX ADMIN — FUROSEMIDE 40 MG: 10 INJECTION, SOLUTION INTRAMUSCULAR; INTRAVENOUS at 09:04

## 2024-04-24 RX ADMIN — SODIUM CHLORIDE 250 ML: 9 INJECTION, SOLUTION INTRAVENOUS at 07:04

## 2024-04-24 RX ADMIN — HYDRALAZINE HYDROCHLORIDE 10 MG: 20 INJECTION INTRAMUSCULAR; INTRAVENOUS at 03:04

## 2024-04-24 RX ADMIN — FUROSEMIDE 40 MG: 10 INJECTION INTRAMUSCULAR; INTRAVENOUS at 08:04

## 2024-04-24 RX ADMIN — VERAPAMIL HYDROCHLORIDE 5 MG: 2.5 INJECTION, SOLUTION INTRAVENOUS at 09:04

## 2024-04-24 RX ADMIN — SACUBITRIL AND VALSARTAN 2 TABLET: 24; 26 TABLET, FILM COATED ORAL at 08:04

## 2024-04-24 RX ADMIN — ATORVASTATIN CALCIUM 20 MG: 20 TABLET, FILM COATED ORAL at 08:04

## 2024-04-24 NOTE — ED PROVIDER NOTES
Encounter Date: 4/24/2024       History     Chief Complaint   Patient presents with    Shortness of Breath     Pt c/o sob x 2 days.  Reports intermittent mid sternal chest pain.       Patient stated he started feeling funny 3 days ago on Sunday.  Two days ago he started having intermittent shortness of breath.  Which progressively worsened.  He presented to the ED with chest pain and shortness of breath.  Wife states this past weekend he drank quite a bit of alcohol and was mowing his grass on Sunday.  She stated that he does not remain compliant with taking his medication.  And had a fall this past weekend.    The history is provided by the patient. A  was used.   Chest Pain  The current episode started several days ago. Duration of episode(s) is 3 days. Chest pain occurs constantly. The chest pain is worsening. The pain is associated with exertion and breathing. At its most intense, the chest pain is at 4/10. The chest pain is currently at 4/10. The quality of the pain is described as squeezing and pressure-like. The pain does not radiate. Chest pain is worsened by exertion. Primary symptoms include shortness of breath and palpitations. Pertinent negatives for primary symptoms include no fever and no nausea.   The shortness of breath began  more than 2 days ago. The shortness of breath developed gradually. The shortness of breath is moderate. The patient's medical history is significant for CHF.   The palpitations began two days ago. An episode of palpitations lasts for more than 30 minutes. The palpitations have occurred more than 10 time(s). The palpitations occur while in bed. The palpitations also occurred with shortness of breath.   Pertinent negatives for associated symptoms include no weakness. He tried nothing for the symptoms. Risk factors include alcohol intake, male gender and smoking/tobacco exposure.   His past medical history is significant for CHF, diabetes, hyperlipidemia and  hypertension. Past medical history comments: Atrial fibrillation   His family medical history is significant for heart disease and hypertension.   Procedure history is positive for cardiac catheterization, echocardiogram and stress echo.     Review of patient's allergies indicates:  No Known Allergies  Past Medical History:   Diagnosis Date    Atrial flutter     Diabetes mellitus     Hypercholesteremia     Hypertension     Morbid obesity     Obese     Tobacco user      Past Surgical History:   Procedure Laterality Date    ANGIOGRAM, CORONARY, WITH LEFT HEART CATHETERIZATION N/A 07/29/2022    Procedure: Angiogram, Coronary, with Left Heart Cath;  Surgeon: Yossi Zuniga MD;  Location: [facility] CATH LAB;  Service: Cardiology;  Laterality: N/A;    Cardioversion x 2      COLONOSCOPY  10/24/2023    Comprehensive electrophysiologic eval. including insertion & repositioning of multiple electrode catheters with induction or attempted induction of arrhythmias with left atrial pacing & recordingfrom coronary sinus or left atrium  07/10/2018    comprehensive electrophysiologic eval. insertion & repositioning multiple electrode catheters with induction or attempted induction of an arrhythmia with right atrial pacing and recording, right ventricular pacing and recording  07/10/2018    Destruction of Conduction Mechanism, Percutaneous Approach  07/10/2018     Family History   Problem Relation Name Age of Onset    Hypertension Mother      Valvular heart disease Father       Social History     Tobacco Use    Smoking status: Some Days     Types: Cigars     Passive exposure: Current    Smokeless tobacco: Never    Tobacco comments:     Occasionally, last smoked 7/22/23   Substance Use Topics    Alcohol use: Yes     Alcohol/week: 6.0 standard drinks of alcohol     Types: 6 Cans of beer per week     Comment: Drinks occasionally beer on weekends    Drug use: Never     Review of Systems   Constitutional:  Negative for fever.   HENT:   Negative for sore throat.    Respiratory:  Positive for shortness of breath.    Cardiovascular:  Positive for chest pain and palpitations.   Gastrointestinal:  Negative for nausea.   Genitourinary:  Negative for dysuria.   Musculoskeletal:  Negative for back pain.   Skin:  Negative for rash.   Neurological:  Negative for weakness.   Hematological:  Does not bruise/bleed easily.       Physical Exam     Initial Vitals [04/24/24 0707]   BP Pulse Resp Temp SpO2   (!) 150/107 (!) 144 20 99.4 °F (37.4 °C) 96 %      MAP       --         Physical Exam    Nursing note and vitals reviewed.  Constitutional: He appears well-developed and well-nourished. No distress.   HENT:   Head: Normocephalic and atraumatic.   Eyes: Pupils are equal, round, and reactive to light.   Neck: Neck supple.   Normal range of motion.  Cardiovascular:  Normal heart sounds, intact distal pulses and normal pulses. An irregularly irregular rhythm present.   Tachycardia present.         Atrial fibrillation with RVR   Pulmonary/Chest: Breath sounds normal. Tachypnea noted.   Abdominal: Abdomen is soft. Bowel sounds are normal.   Musculoskeletal:         General: Normal range of motion.      Cervical back: Normal range of motion and neck supple.     Neurological: He is alert and oriented to person, place, and time. He has normal strength. GCS score is 15. GCS eye subscore is 4. GCS verbal subscore is 5. GCS motor subscore is 6.   Skin: Skin is warm and dry.   Psychiatric: Thought content normal.         ED Course   Critical Care    Date/Time: 4/24/2024 7:25 AM    Performed by: Cristiana Zamora FNP  Authorized by: Jayce Mehta MD  Direct patient critical care time: 12 minutes  Additional history critical care time: 5 minutes  Ordering / reviewing critical care time: 12 minutes  Documentation critical care time: 5 minutes  Consulting other physicians critical care time: 5 minutes  Total critical care time (exclusive of procedural time) : 39  minutes  Critical care was necessary to treat or prevent imminent or life-threatening deterioration of the following conditions: cardiac failure.  Critical care was time spent personally by me on the following activities: development of treatment plan with patient or surrogate, discussions with consultants, interpretation of cardiac output measurements, evaluation of patient's response to treatment, examination of patient, obtaining history from patient or surrogate, ordering and performing treatments and interventions, ordering and review of radiographic studies, ordering and review of laboratory studies, pulse oximetry, re-evaluation of patient's condition and review of old charts.      ED US Echo    Date/Time: 4/24/2024 7:28 AM    Performed by: Cristiana Zamora FNP  Authorized by: Jayce Mehta MD    Indication:  Dyspnea  Identified Structures:     The pericardial sac, myocardium, and 4 chambers were identified with the following echocardiographic windows:  Parasternal long axis and IVC  Findings:     Pericardial Effusion:  Absent    Left Ventricle Ejection Fraction:  Moderately reduced (35-55%)  IVC:     Diameter:  > 2cm    Collapsibility:  < 50%    Gross Salome (RV:LV):  Normal    Impression:  Diminished LV function    Charge?:  No (educational)    Labs Reviewed   COMPREHENSIVE METABOLIC PANEL - Abnormal; Notable for the following components:       Result Value    Chloride 110 (*)     Carbon Dioxide 19 (*)     Glucose Level 125 (*)     Creatinine 1.44 (*)     Albumin Level 3.2 (*)     Globulin 4.2 (*)     Albumin/Globulin Ratio 0.8 (*)     All other components within normal limits   B-TYPE NATRIURETIC PEPTIDE - Abnormal; Notable for the following components:    Natriuretic Peptide 2,168.1 (*)     All other components within normal limits   CBC WITH DIFFERENTIAL - Abnormal; Notable for the following components:    WBC 14.16 (*)     RBC 4.58 (*)     Hct 41.1 (*)     MPV 12.9 (*)     Neut # 11.81  (*)     IG# 0.07 (*)     All other components within normal limits   TROPONIN I - Normal   MAGNESIUM - Normal   CBC W/ AUTO DIFFERENTIAL    Narrative:     The following orders were created for panel order CBC auto differential.  Procedure                               Abnormality         Status                     ---------                               -----------         ------                     CBC with Differential[[id number]]       Abnormal            Final result                 Please view results for these tests on the individual orders.   EXTRA TUBES    Narrative:     The following orders were created for panel order EXTRA TUBES.  Procedure                               Abnormality         Status                     ---------                               -----------         ------                     Light Blue Top Hold[[id number]]                             Final result               Gold Top Hold[[id number]]                                   Final result                 Please view results for these tests on the individual orders.   LIGHT BLUE TOP HOLD   GOLD TOP HOLD   EXTRA TUBES    Narrative:     The following orders were created for panel order EXTRA TUBES.  Procedure                               Abnormality         Status                     ---------                               -----------         ------                     Light Blue Top Hold[[id number]]                             Final result               Lavender Top Hold[[id number]]                               Final result               Gold Top Hold[[id number]]                                   Final result                 Please view results for these tests on the individual orders.   LIGHT BLUE TOP HOLD   LAVENDER TOP HOLD   GOLD TOP HOLD   COVID/RSV/FLU A&B PCR   POCT GLUCOSE MONITORING CONTINUOUS     EKG Readings: (Independently Interpreted)   Initial Reading: No STEMI. Previous EKG Date: 3/14/23. Rhythm: Atrial Fibrillation.  Heart Rate: 138. ST Segments: Normal ST Segments. T Waves: Normal. Axis: Normal. Clinical Impression: Atrial Fibrillation with RVR     ECG Results              EKG 12-lead (In process)        Collection Time Result Time QRS Duration OHS QTC Calculation    04/24/24 07:08:57 04/24/24 08:29:16 78 471                     In process by Interface, Lab In [facility] (04/24/24 08:29:21)                   Narrative:    Test Reason : R07.9,    Vent. Rate : 141 BPM     Atrial Rate : 125 BPM     P-R Int : 000 ms          QRS Dur : 078 ms      QT Int : 308 ms       P-R-T Axes : 000 031 111 degrees     QTc Int : 471 ms    Atrial fibrillation with rapid ventricular response  Nonspecific T wave abnormality  Abnormal ECG  When compared with ECG of 14-FEB-2024 09:23,  No significant change was found    Referred By: [email or web address]   SELF           Confirmed By:                                   Imaging Results              X-Ray Chest AP Portable (Final result)  Result time 04/24/24 08:02:57      Final result by Cedrick Padilla MD (04/24/24 08:02:57)                   Impression:      As above.      Electronically signed by: Cedrick Padilla  Date:    04/24/2024  Time:    08:02               Narrative:    EXAMINATION:  XR CHEST AP PORTABLE    CLINICAL HISTORY:  Chest Pain;    TECHNIQUE:  One    COMPARISON:  None available.    FINDINGS:  Cardiopericardial silhouette is enlarged.  Lungs interstitial markings prominence reflect mild to moderate congestive process.  There is right mid lung zone oblong configuration configuration opacity which may represent fluid within the interlobar fissure.  Follow-up exams are recommended to exclude the possibility of a mass lesion.  There also bilateral dependent pleural effusions.  Left lower lung lobe retrocardiac area is rather obscured.  No pneumothorax.                                       Medications   metoprolol succinate (TOPROL-XL) 24 hr tablet 50 mg (has no administration in time range)    sacubitriL-valsartan 49-51 mg per tablet 1 tablet (has no administration in time range)   rivaroxaban tablet 20 mg (has no administration in time range)   atorvastatin tablet 20 mg (has no administration in time range)   sodium chloride 0.9% flush 10 mL (has no administration in time range)   naloxone 0.4 mg/mL injection 0.02 mg (has no administration in time range)   glucose chewable tablet 16 g (has no administration in time range)   glucose chewable tablet 24 g (has no administration in time range)   glucagon (human recombinant) injection 1 mg (has no administration in time range)   insulin aspart U-100 injection 0-5 Units (has no administration in time range)   ondansetron injection 4 mg (has no administration in time range)   acetaminophen tablet 650 mg (has no administration in time range)   nitroGLYCERIN SL tablet 0.4 mg (has no administration in time range)   dextrose 10% bolus 125 mL 125 mL (has no administration in time range)   dextrose 10% bolus 250 mL 250 mL (has no administration in time range)   sodium chloride 0.9% bolus 250 mL 250 mL (0 mLs Intravenous Stopped 4/24/24 0835)   nitroGLYCERIN 2% TD oint ointment 1 inch (1 inch Topical (Top) Given 4/24/24 0740)   furosemide injection 40 mg (40 mg Intravenous Given 4/24/24 0910)   verapamiL injection 5 mg (5 mg Intravenous Given 4/24/24 0950)     Medical Decision Making  Amount and/or Complexity of Data Reviewed  External Data Reviewed: notes.     Details: 9/2022  Summary  · Show Result ComparisonThe left ventricle is normal in size with mildly decreased systolic function.  · Normal right ventricular size with normal right ventricular systolic function.  · The estimated ejection fraction is 40%.  · Moderate right atrial enlargement.  · Mild mitral regurgitation.  · Atrial fibrillation observed.  · There is mild to moderate pulmonary hypertension.  · Moderate left atrial enlargement.         Labs: ordered. Decision-making details documented in ED  Course.  Radiology: ordered and independent interpretation performed. Decision-making details documented in ED Course.  ECG/medicine tests: ordered and independent interpretation performed. Decision-making details documented in ED Course.  Discussion of management or test interpretation with external provider(s): 9:04 AM Consult: I discussed the case with Dr. Ball ([facility]). Agrees with current management.   Recommends will evaluate in ED      Risk  Prescription drug management.      Additional MDM:   Differential Diagnosis:   Symptom: Chest pain. <> The follow diagnoses were considered and will be evaluated: Acute MI, Atypical Chest Pain, Dissecting Aorta, Pulmonary Embolism and ST Elevation MI.             Attending Attestation:     Physician Attestation Statement for NP/PA:   I personally made/approved the management plan and take responsibility for the patient management.    Other NP/PA Attestation Additions:    History of Present Illness: Hx of CHF presents with shortness of breath after working in the backyard on Sunday   Physical Exam: Irregular, Irregular rhythm, tachydaric   Medical Decision Making: Pt with LUIS EDUARDO and CHF exacerbation with A Fib RVR   Procedure Note: Critical Care and Bedside Echo            ED Course as of 04/24/24 1009   Wed Apr 24, 2024   0732 Has not had viagra in 2 weeks. Will apply nitro paste to chest wall   [LS]   0745 Transitioned care to Dr. Samuel [LS]      ED Course User Index  [LS] Cristiana Zamora, ULYSSESP                             Clinical Impression:  Final diagnoses:  [R06.02] SOB (shortness of breath)  [R07.9] Chest pain  [I50.9] Acute on chronic congestive heart failure, unspecified heart failure type (Primary)  [I48.91] Atrial fibrillation with RVR  [Z87.898] History of alcohol use  [N17.9] LUIS EDUARDO (acute kidney injury)          ED Disposition Condition    Observation Fair                Andrea Fuentes MD  04/24/24 0904       Andrea Fuentes,  MD  04/24/24 1001

## 2024-04-24 NOTE — CONSULTS
Inpatient Nutrition Assessment    Admit Date: 4/24/2024   Total duration of encounter: 1 day   Patient Age: 63 y.o.    Nutrition Recommendation/Prescription     Continue 2000 calorie/diabetic/ cardiac diet  Pt education on diet/complete  MVI/fe  Biweekly wt  Will monitor nutrition status     Communication of Recommendations: reviewed with nurse and reviewed with patient    Nutrition Assessment     Malnutrition Assessment/Nutrition-Focused Physical Exam    Malnutrition Context: chronic illness (04/24/24 1328)  Malnutrition Level: other (see comments) (pt does not meet malnutrition criteria) (04/24/24 1328)           Orbital Region (Subcutaneous Fat Loss): well nourished  Upper Arm Region (Subcutaneous Fat Loss): well nourished           Clavicle Bone Region (Muscle Loss): well nourished                             A minimum of two characteristics is recommended for diagnosis of either severe or non-severe malnutrition.    Chart Review    Reason Seen: continuous nutrition monitoring and physician consult for diet education     Malnutrition Screening Tool Results              Diagnosis:  CHF, B pleural effusion R lobe opacity, AF, RVR, LUIS EDUARDO/CKD, NAGMA, DM, HTN, HLD, SHADI    Relevant Medical History: CHF, CKD, Afib, SHADI    Scheduled Medications:  atorvastatin, 20 mg, QHS  furosemide (LASIX) injection, 40 mg, Q12H  metoprolol succinate, 50 mg, Daily  rivaroxaban, 20 mg, Daily  sacubitriL-valsartan, 2 tablet, BID    Continuous Infusions:   PRN Medications:   Current Facility-Administered Medications:     acetaminophen, 650 mg, Oral, Q8H PRN    dextrose 10%, 12.5 g, Intravenous, PRN    dextrose 10%, 25 g, Intravenous, PRN    glucagon (human recombinant), 1 mg, Intramuscular, PRN    glucose, 16 g, Oral, PRN    glucose, 24 g, Oral, PRN    insulin aspart U-100, 0-5 Units, Subcutaneous, QID (AC + HS) PRN    naloxone, 0.02 mg, Intravenous, PRN    nitroGLYCERIN, 0.4 mg, Sublingual, Q5 Min PRN    ondansetron, 4 mg, Intravenous, Q8H  "PRN    sodium chloride 0.9%, 10 mL, Intravenous, Q12H PRN    Calorie Containing IV Medications: no significant kcals from medications at this time    Recent Labs   Lab 04/24/24  0730 04/24/24  0758   NA  --  139   K  --  4.3   CALCIUM  --  9.3   MG  --  1.90   CHLORIDE  --  110*   CO2  --  19*   BUN  --  15.8   CREATININE  --  1.44*   EGFRNORACEVR  --  54   GLUCOSE  --  125*   BILITOT  --  1.2   ALKPHOS  --  98   ALT  --  16   AST  --  19   ALBUMIN  --  3.2*   WBC 14.16*  --    HGB 14.0  --    HCT 41.1*  --      Nutrition Orders:  Diet diabetic Cardiac (Low Na/Chol); 2000 Calorie      Appetite/Oral Intake: good/% of meals  Factors Affecting Nutritional Intake: shortness of breath  Social Needs Impacting Access to Food: none identified  Food/Pent[employer]ostal/Cultural Preferences: none reported  Food Allergies: none reported     Wound(s):  none reported     Comments    (4/24) Received consult for diet eval/education. Pt reported he was on a Low Na/diabetic diet at home; but admitted to eating boiled crawfish this weekend; noticed SOB. Pt stated wt fluctuates between 298-317#; fluid; on diuretic. Pt reported good appetite. Pt education provided on diet tx.     Anthropometrics    Height: 5' 10" (177.8 cm), Height Method: Stated  Last Weight: (!) 138.3 kg (304 lb 14.3 oz) (04/24/24 0707), Weight Method: Standard Scale  BMI (Calculated): 43.7  BMI Classification: obese grade III (BMI >/=40)        Ideal Body Weight (IBW), Male: 166 lb     % Ideal Body Weight, Male (lb): 183.67 %                 Usual Body Weight (UBW), kg: (!) 138 kg (pt reported wt fluctuates between 298 to 317#)  % Usual Body Weight: 100.43     Usual Weight Provided By: patient and EMR weight history    Wt Readings from Last 5 Encounters:   04/24/24 (!) 138.3 kg (304 lb 14.3 oz)   02/14/24 (!) 139.4 kg (307 lb 6.4 oz)   01/26/24 (!) 142.9 kg (315 lb)   12/15/23 (!) 144.2 kg (317 lb 12.8 oz)   11/03/23 (!) 145.1 kg (319 lb 12.8 oz)     Weight Change(s) " Since Admission: pt stated wt fluctuates /fluid   Wt Readings from Last 1 Encounters:   04/24/24 0707 (!) 138.3 kg (304 lb 14.3 oz)   Admit Weight: (!) 138.3 kg (304 lb 14.3 oz) (04/24/24 0707), Weight Method: Standard Scale    Estimated Needs    Weight Used For Calorie Calculations: (!) 138.3 kg (304 lb 14.3 oz)  Energy Calorie Requirements (kcal): 1936 to 2074 kcal/d; 14-15 colten/kg; obese  Energy Need Method: Kcal/kg  Weight Used For Protein Calculations: 75.4 kg (166 lb 3.6 oz) (IBW used estimated needs)  Protein Requirements: 120 gm protein/d; 1.6 gm/kg IBW  Fluid Requirements (mL): 1936 ml/d; 1ml/colten  CHO Requirement: 218 gm CHO/day     Enteral Nutrition     Patient not receiving enteral nutrition at this time.    Parenteral Nutrition     Patient not receiving parenteral nutrition support at this time.    Evaluation of Received Nutrient Intake    Calories: meeting estimated needs  Protein: meeting estimated needs    Patient Education     Education Provided: diabetic diet and heart healthy diet  Teaching Method: explanation and printed materials  Comprehension: verbalizes understanding  Barriers to Learning: none evident  Expected Compliance: fair  Comments: All questions were answered and dietitian's contact information was provided.     Nutrition Diagnosis     PES: Food and nutrition related knowledge deficit related to lack of prior nutrition-related education as evidenced by verbalized limited understanding of diet/eating high Na foods. (new)     Nutrition Interventions     Intervention(s): modified composition of meals/snacks, multivitamin/mineral supplement therapy, purpose of nutrition education, and collaboration with other providers    Goal: Meet greater than 80% of nutritional needs by follow-up. (new)  Goal: Verbalize understanding of diet by discharge. (new)    Nutrition Goals & Monitoring     Dietitian will monitor: food and beverage intake, weight, food/nutrition knowledge skill, and  glucose/endocrine profile  Discharge planning: continue 2000 diabetic/ card  diet  Nutrition Risk/Follow-Up: low (follow-up in 5-7 days)   Please consult if re-assessment needed sooner.

## 2024-04-24 NOTE — H&P
"[facility] History and Physical     Date of Admit: 4/24/2024    Chief Complaint     Shortness of Breath (Pt c/o sob x 2 days.  Reports intermittent mid sternal chest pain.  )    Subjective:      History of Present Illness:  Jamey Duke is a 63 y.o. male who has a PMH of CHF (EF 40% in 1/2023), CKD, Afib on Xarelto with failed DCCV, and SHADI on CPAP. The patient presented to [facility] ED on 4/24/2024 with a primary complaint of shortness of breath for 3 days. Patient reports he ate crawfish and had multiple beers on Saturday evening and subsequently developed shortness of breath on Sunday. Symptoms worsened on Monday but improved Tuesday morning (1 day prior to admission) allowing him to cut grass yesterday. He then states symptoms worsening again overnight with significant orthopnea preventing sleep. He also reports minimal substernal discomfort which remits when he is able to catch his breath. Additional symptoms include lightheadedness and mild occipital headache this morning. Denies fever, chills, n/v/d/c, abdominal pain, dysuria, hematuria, hematochezia, or melena. Reports 100% compliance with medications. Per ED, friend at bedside states patient is not compliance with meds and drank quite a bit of alcohol Sunday while mowing the lawn. Patient smokes "black and mild" cigars a couple times per month, does not use smokeless tobacco, drinks 6-pack of beer on the weekends, denies drug use. Initial vitals T 99.4 F, /107,  (AF RVR on EKG), RR 20, SpO2 96% on RA. Workup significant for elevated BNP 2168, neg troponin, LUIS EDUARDO on CKD (Cr 1.44 from baseline 1.29), NAGMA Bicarb 19, and leukocytosis WBC 14 with left shift. CXR significant for prominent interstitial markings, bilateral pleural effusions, and right mid lung opacity. Admitted to Internal Medicine for management of CHF exacerbation.    Past Medical History:  Past Medical History:   Diagnosis Date    Atrial flutter     Diabetes mellitus     " Hypercholesteremia     Hypertension     Morbid obesity     Obese     Tobacco user        Past Surgical History:  Past Surgical History:   Procedure Laterality Date    ANGIOGRAM, CORONARY, WITH LEFT HEART CATHETERIZATION N/A 07/29/2022    Procedure: Angiogram, Coronary, with Left Heart Cath;  Surgeon: Yossi Zuniga MD;  Location: [facility] CATH LAB;  Service: Cardiology;  Laterality: N/A;    Cardioversion x 2      COLONOSCOPY  10/24/2023    Comprehensive electrophysiologic eval. including insertion & repositioning of multiple electrode catheters with induction or attempted induction of arrhythmias with left atrial pacing & recordingfrom coronary sinus or left atrium  07/10/2018    comprehensive electrophysiologic eval. insertion & repositioning multiple electrode catheters with induction or attempted induction of an arrhythmia with right atrial pacing and recording, right ventricular pacing and recording  07/10/2018    Destruction of Conduction Mechanism, Percutaneous Approach  07/10/2018       Allergies:  Review of patient's allergies indicates:  No Known Allergies    Home Medications:  Prior to Admission medications    Medication Sig Start Date End Date Taking? Authorizing Provider   blood sugar diagnostic Strp Inject 1 strip into the skin 2 (two) times a day. 5/12/22   Geetha Pickens FNP   blood-glucose meter kit   True Metrix Glucometer, See Instructions, Use to check blood glucose daily, # 1 EA, 0 Refill(s), Pharmacy: [facility], 177.8, cm, Height/Length Dosing, 01/12/22 9:55:00 CST, 148.4, kg, Weight Dosing, 01/12/22 9:55:00 CST 1/14/22   Provider, Historical   cetirizine (ZYRTEC) 10 MG tablet Take 1 tablet (10 mg total) by mouth nightly as needed for Allergies or Rhinitis. 1/26/24   Geetha Pickens FNP   EASY TOUCH TWIST LANCETS 33 gauge Misc USE AS DIRECTED TO CHECK BLOOD GLUCOSE DAILY 1/14/22   Provider, Historical   empagliflozin (JARDIANCE) 10 mg tablet Take 1 tablet (10 mg total)  by mouth once daily. 9/15/23 9/14/24  Devan, Laterica L, FNP   fluticasone propionate (FLONASE) 50 mcg/actuation nasal spray 1 spray (50 mcg total) by Each Nostril route once daily. 1/26/24   Geetha Pickens, MARIBELL   furosemide (LASIX) 40 MG tablet Take 1 tablet (40 mg total) by mouth 2 (two) times daily. 4/2/24 4/2/25  Devan, Laterica L, FNP   hydrALAZINE (APRESOLINE) 50 MG tablet Take 1 tablet (50 mg total) by mouth 2 (two) times daily. 4/2/24 4/2/25  Devan, Laterica L, FNP   LANCETS MISC   True Metrix Lancets, See Instructions, Use to check blood glucose daily, # 100 EA, 11 Refill(s), Pharmacy: [facility], 177.8, cm, Height/Length Dosing, 01/12/22 9:55:00 CST, 148.4, kg, Weight Dosing, 01/12/22 9:55:00 CST 1/14/22   Provider, Historical   metoprolol succinate (TOPROL-XL) 50 MG 24 hr tablet Take 1 tablet (50 mg total) by mouth once daily. 4/2/24 4/2/25  Devan, Laterica L, FNP   potassium chloride SA (KLOR-CON M20) 20 MEQ tablet Take 1 tablet (20 mEq total) by mouth 2 (two) times daily. 4/2/24   Devan, Laterica L, FNP   pravastatin (PRAVACHOL) 40 MG tablet Take 1 tablet (40 mg total) by mouth every evening. 4/2/24 4/2/25  Devan, Laterica L, FNP   sacubitriL-valsartan (ENTRESTO) 49-51 mg per tablet Take 1 tablet by mouth 2 (two) times daily. 12/11/23 12/10/24  Devan, Laterica L, FNP   sildenafiL (VIAGRA) 50 MG tablet Take 1 tablet (50 mg total) by mouth daily as needed for Erectile Dysfunction.  Patient not taking: Reported on 2/14/2024 9/5/23 9/4/24  Yossi Abreu NP   XARELTO 20 mg Tab Take 1 tablet (20 mg total) by mouth once daily. 9/15/23 9/14/24  Christin Hartman, FNP       Family History:  Family History   Problem Relation Name Age of Onset    Hypertension Mother      Valvular heart disease Father         Social History:  Social History     Tobacco Use    Smoking status: Some Days     Types: Cigars     Passive exposure: Current    Smokeless tobacco: Never    Tobacco  "comments:     Occasionally, last smoked 7/22/23   Substance Use Topics    Alcohol use: Yes     Alcohol/week: 6.0 standard drinks of alcohol     Types: 6 Cans of beer per week     Comment: Drinks occasionally beer on weekends    Drug use: Never       Review of Systems:  Review of Systems   Constitutional:  Negative for chills and fever.   Respiratory:  Positive for shortness of breath. Negative for wheezing.    Cardiovascular:  Positive for orthopnea. Negative for chest pain.   Gastrointestinal:  Negative for abdominal pain, blood in stool, constipation, diarrhea, melena, nausea and vomiting.   Genitourinary:  Negative for dysuria and hematuria.   Neurological:  Negative for dizziness and headaches.              Objective:   Last 24 Hour Vital Signs:  Vitals  BP: (!) 144/119  Temp: 99.4 °F (37.4 °C)  Temp Source: Oral  Pulse: (!) 118  Resp: 18  SpO2: 98 %  Height: 5' 10" (177.8 cm)  Weight: (!) 138.3 kg (304 lb 14.3 oz)    Physical Examination:  General: No acute distress, HOB elevated   HEENT: Head-normocephalic and atraumatic, MMM, obese neck  Respiratory: bilateral lung base crackles (R>L), upper lung fields CTAB, on room air  Cardiovascular: irregular rate and rhythm without murmurs.  No gallops or rubs, no JVD  Gastrointestinal: obese, soft, non-tender, non-distended with normal bowel sounds, without masses to palpation  Musculoskeletal: no lower extremity edema  Integumentary: no rashes or skin lesions present  Neurologic: alert and oriented, no signs of peripheral neurological deficit, motor/sensory function intact  Psychiatric: cognitive function intact, cooperative with exam    Laboratory:  Most Recent Data:  CBC:   Lab Results   Component Value Date    WBC 14.16 (H) 04/24/2024    HGB 14.0 04/24/2024    HCT 41.1 (L) 04/24/2024     04/24/2024    MCV 89.7 04/24/2024    RDW 16.7 04/24/2024     WBC Differential:   Recent Labs   Lab 04/24/24  0730   WBC 14.16*   HGB 14.0   HCT 41.1*      MCV 89.7 " "    BMP:   Lab Results   Component Value Date     04/24/2024    K 4.3 04/24/2024    CO2 19 (L) 04/24/2024    BUN 15.8 04/24/2024    CREATININE 1.44 (H) 04/24/2024    CALCIUM 9.3 04/24/2024    MG 1.90 04/24/2024     LFTs:   Lab Results   Component Value Date    ALBUMIN 3.2 (L) 04/24/2024    BILITOT 1.2 04/24/2024    AST 19 04/24/2024    ALKPHOS 98 04/24/2024    ALT 16 04/24/2024     Coags:   Lab Results   Component Value Date    INR 1.6 (H) 07/26/2022    PROTIME 18.9 (H) 07/26/2022    PTT 37.4 07/26/2022     FLP:   Lab Results   Component Value Date    CHOL 165 07/26/2023    HDL 39 07/26/2023    TRIG 131 07/26/2023     DM:   Lab Results   Component Value Date    HGBA1C 5.8 07/26/2023    HGBA1C 5.9 01/26/2023    HGBA1C 6.3 05/12/2022    CREATININE 1.44 (H) 04/24/2024     Thyroid:   Lab Results   Component Value Date    TSH 2.1907 05/12/2022     Anemia: No results found for: "IRON", "TIBC", "FERRITIN", "OKBECSLR22", "FOLATE"  Cardiac:   Lab Results   Component Value Date    TROPONINI 0.030 04/24/2024    BNP 2,168.1 (H) 04/24/2024     Urinalysis:   Lab Results   Component Value Date    COLORU Light-Yellow 01/12/2022    PHUA 5.5 11/03/2023    NITRITE Negative 01/12/2022    KETONESU Negative 01/12/2022    UROBILINOGEN Normal 11/03/2023    WBCUA 0-5 11/03/2023       Radiology:  Imaging Results              X-Ray Chest AP Portable (Final result)  Result time 04/24/24 08:02:57      Final result by Cedrick Padilla MD (04/24/24 08:02:57)                   Impression:      As above.      Electronically signed by: Cedrick Padilla  Date:    04/24/2024  Time:    08:02               Narrative:    EXAMINATION:  XR CHEST AP PORTABLE    CLINICAL HISTORY:  Chest Pain;    TECHNIQUE:  One    COMPARISON:  None available.    FINDINGS:  Cardiopericardial silhouette is enlarged.  Lungs interstitial markings prominence reflect mild to moderate congestive process.  There is right mid lung zone oblong configuration configuration opacity " which may represent fluid within the interlobar fissure.  Follow-up exams are recommended to exclude the possibility of a mass lesion.  There also bilateral dependent pleural effusions.  Left lower lung lobe retrocardiac area is rather obscured.  No pneumothorax.                                           Assessment & Plan:     CHF exacerbation  - EF 40% in 1/2024, moderate KIRSTEN and LAE, mild MR, mild to mod Pulm HTN  - Reports compliance with GDMT and Lasix   - Increased salt and alcohol intake over the weekend  - ED bedside ECHO with enlarged IVC  - Lasix 40 IV x 1 in ED, monitor response and adjust accordingly  - Concern for dehydration after working outside,  cc bolus in ED  - Resume Entresto 49-51, Toprol 50, Statin  - Pending response to lasix, continue 40 mg IV daily versus BID  - Strict intake/output  - Monitor lytes and renal function    Bilateral pleural effusion  Right middle lobe opacity  - Bilateral pleural effusion likely secondary to CHF exacerbation  - Unclear etiology of right middle lobe opacity  - Leukocytosis may be reactive vs infectious, order COVID/FLU/RSV  - CT Chest without contrast to further characterization right middle lobe abnormality    AF RVR  -  on admission, EKG with AF RVR  - HR improved to low 100s-110s  - Resume Toprol 50 mg daily, Xarelto 20 mg daily  - Continuous cardiac monitoring    LUIS EDUADRO on CKD  NAGMA  - Baseline Cr 1.29, Admission Cr 1.44  - Cardiorenal vs dehydration  - Continue diuresis and repeat BMP this afternoon to guide further diuresis    T2DM  - Well controlled, A1c 5.9 in 1/2024  - Low dose SSI    HTN  HLD  - Hold hydralazine  - Atorvastatin 20 mg QHS in place of home pravastatin    SHDAI on CPAP  - Resume CPAP QHS      CODE STATUS: Full Code  Access: Peripheral  Antibiotics: None  Diet:  Cardiac, Diabetic  DVT Prophylaxis: Home Xarelto  GI Prophylaxis: None  Fluids: None      Dispo: 63 y.o. male admitted for CHF exacerbation, AF RVR, LUIS EDUARDO. Continue diuresis.  Following up imaging. Discharge pending clinical course.      Zeny Ball MD  [facility] Internal Medicine, PRG-2  4/24/2024

## 2024-04-25 LAB
ALBUMIN SERPL-MCNC: 3 G/DL (ref 3.4–4.8)
ALBUMIN/GLOB SERPL: 0.7 RATIO (ref 1.1–2)
ALP SERPL-CCNC: 89 UNIT/L (ref 40–150)
ALT SERPL-CCNC: 13 UNIT/L (ref 0–55)
AST SERPL-CCNC: 16 UNIT/L (ref 5–34)
BASOPHILS # BLD AUTO: 0.04 X10(3)/MCL
BASOPHILS NFR BLD AUTO: 0.3 %
BILIRUB SERPL-MCNC: 1 MG/DL
BUN SERPL-MCNC: 22.7 MG/DL (ref 8.4–25.7)
CALCIUM SERPL-MCNC: 9.2 MG/DL (ref 8.8–10)
CHLORIDE SERPL-SCNC: 109 MMOL/L (ref 98–107)
CO2 SERPL-SCNC: 21 MMOL/L (ref 23–31)
CREAT SERPL-MCNC: 1.4 MG/DL (ref 0.73–1.18)
CRP SERPL-MCNC: 168.7 MG/L
EOSINOPHIL # BLD AUTO: 0.1 X10(3)/MCL (ref 0–0.9)
EOSINOPHIL NFR BLD AUTO: 0.8 %
ERYTHROCYTE [DISTWIDTH] IN BLOOD BY AUTOMATED COUNT: 16.5 % (ref 11.5–17)
ERYTHROCYTE [SEDIMENTATION RATE] IN BLOOD: 74 MM/HR (ref 0–15)
GFR SERPLBLD CREATININE-BSD FMLA CKD-EPI: 56 MLS/MIN/1.73/M2
GLOBULIN SER-MCNC: 4.2 GM/DL (ref 2.4–3.5)
GLUCOSE SERPL-MCNC: 109 MG/DL (ref 82–115)
HCT VFR BLD AUTO: 39.2 % (ref 42–52)
HGB BLD-MCNC: 13.1 G/DL (ref 14–18)
HOLD SPECIMEN: NORMAL
IMM GRANULOCYTES # BLD AUTO: 0.03 X10(3)/MCL (ref 0–0.04)
IMM GRANULOCYTES NFR BLD AUTO: 0.3 %
LYMPHOCYTES # BLD AUTO: 1.92 X10(3)/MCL (ref 0.6–4.6)
LYMPHOCYTES NFR BLD AUTO: 16.1 %
MAGNESIUM SERPL-MCNC: 2 MG/DL (ref 1.6–2.6)
MCH RBC QN AUTO: 30 PG (ref 27–31)
MCHC RBC AUTO-ENTMCNC: 33.4 G/DL (ref 33–36)
MCV RBC AUTO: 89.7 FL (ref 80–94)
MONOCYTES # BLD AUTO: 0.89 X10(3)/MCL (ref 0.1–1.3)
MONOCYTES NFR BLD AUTO: 7.5 %
NEUTROPHILS # BLD AUTO: 8.92 X10(3)/MCL (ref 2.1–9.2)
NEUTROPHILS NFR BLD AUTO: 75 %
NRBC BLD AUTO-RTO: 0 %
PHOSPHATE SERPL-MCNC: 3.2 MG/DL (ref 2.3–4.7)
PLATELET # BLD AUTO: 194 X10(3)/MCL (ref 130–400)
PMV BLD AUTO: 12.9 FL (ref 7.4–10.4)
POCT GLUCOSE: 105 MG/DL (ref 70–110)
POCT GLUCOSE: 107 MG/DL (ref 70–110)
POCT GLUCOSE: 140 MG/DL (ref 70–110)
POCT GLUCOSE: 86 MG/DL (ref 70–110)
POTASSIUM SERPL-SCNC: 3.8 MMOL/L (ref 3.5–5.1)
PROT SERPL-MCNC: 7.2 GM/DL (ref 5.8–7.6)
RBC # BLD AUTO: 4.37 X10(6)/MCL (ref 4.7–6.1)
SODIUM SERPL-SCNC: 139 MMOL/L (ref 136–145)
WBC # SPEC AUTO: 11.9 X10(3)/MCL (ref 4.5–11.5)

## 2024-04-25 PROCEDURE — 80053 COMPREHEN METABOLIC PANEL: CPT | Performed by: STUDENT IN AN ORGANIZED HEALTH CARE EDUCATION/TRAINING PROGRAM

## 2024-04-25 PROCEDURE — 83735 ASSAY OF MAGNESIUM: CPT | Performed by: STUDENT IN AN ORGANIZED HEALTH CARE EDUCATION/TRAINING PROGRAM

## 2024-04-25 PROCEDURE — 27000221 HC OXYGEN, UP TO 24 HOURS

## 2024-04-25 PROCEDURE — 25000003 PHARM REV CODE 250

## 2024-04-25 PROCEDURE — 84100 ASSAY OF PHOSPHORUS: CPT | Performed by: STUDENT IN AN ORGANIZED HEALTH CARE EDUCATION/TRAINING PROGRAM

## 2024-04-25 PROCEDURE — 25000003 PHARM REV CODE 250: Performed by: STUDENT IN AN ORGANIZED HEALTH CARE EDUCATION/TRAINING PROGRAM

## 2024-04-25 PROCEDURE — 63600175 PHARM REV CODE 636 W HCPCS: Mod: JZ,JG | Performed by: STUDENT IN AN ORGANIZED HEALTH CARE EDUCATION/TRAINING PROGRAM

## 2024-04-25 PROCEDURE — 96375 TX/PRO/DX INJ NEW DRUG ADDON: CPT

## 2024-04-25 PROCEDURE — 63600175 PHARM REV CODE 636 W HCPCS

## 2024-04-25 PROCEDURE — 94660 CPAP INITIATION&MGMT: CPT

## 2024-04-25 PROCEDURE — 94761 N-INVAS EAR/PLS OXIMETRY MLT: CPT

## 2024-04-25 PROCEDURE — 99900035 HC TECH TIME PER 15 MIN (STAT)

## 2024-04-25 PROCEDURE — 36415 COLL VENOUS BLD VENIPUNCTURE: CPT | Performed by: STUDENT IN AN ORGANIZED HEALTH CARE EDUCATION/TRAINING PROGRAM

## 2024-04-25 PROCEDURE — 86140 C-REACTIVE PROTEIN: CPT | Performed by: STUDENT IN AN ORGANIZED HEALTH CARE EDUCATION/TRAINING PROGRAM

## 2024-04-25 PROCEDURE — G0378 HOSPITAL OBSERVATION PER HR: HCPCS

## 2024-04-25 PROCEDURE — 96376 TX/PRO/DX INJ SAME DRUG ADON: CPT

## 2024-04-25 PROCEDURE — 85025 COMPLETE CBC W/AUTO DIFF WBC: CPT | Performed by: STUDENT IN AN ORGANIZED HEALTH CARE EDUCATION/TRAINING PROGRAM

## 2024-04-25 PROCEDURE — 85652 RBC SED RATE AUTOMATED: CPT | Performed by: STUDENT IN AN ORGANIZED HEALTH CARE EDUCATION/TRAINING PROGRAM

## 2024-04-25 RX ORDER — METOPROLOL SUCCINATE 50 MG/1
100 TABLET, EXTENDED RELEASE ORAL DAILY
Status: DISCONTINUED | OUTPATIENT
Start: 2024-04-25 | End: 2024-04-26 | Stop reason: HOSPADM

## 2024-04-25 RX ORDER — FOLIC ACID 1 MG/1
1 TABLET ORAL DAILY
Status: DISCONTINUED | OUTPATIENT
Start: 2024-04-25 | End: 2024-04-26 | Stop reason: HOSPADM

## 2024-04-25 RX ORDER — THIAMINE HCL 100 MG
100 TABLET ORAL DAILY
Status: DISCONTINUED | OUTPATIENT
Start: 2024-04-25 | End: 2024-04-26 | Stop reason: HOSPADM

## 2024-04-25 RX ADMIN — SACUBITRIL AND VALSARTAN 2 TABLET: 24; 26 TABLET, FILM COATED ORAL at 08:04

## 2024-04-25 RX ADMIN — LABETALOL HYDROCHLORIDE 10 MG: 5 INJECTION, SOLUTION INTRAVENOUS at 05:04

## 2024-04-25 RX ADMIN — LABETALOL HYDROCHLORIDE 10 MG: 5 INJECTION, SOLUTION INTRAVENOUS at 12:04

## 2024-04-25 RX ADMIN — RIVAROXABAN 20 MG: 10 TABLET, FILM COATED ORAL at 08:04

## 2024-04-25 RX ADMIN — FOLIC ACID 1 MG: 1 TABLET ORAL at 03:04

## 2024-04-25 RX ADMIN — METOPROLOL SUCCINATE 100 MG: 50 TABLET, FILM COATED, EXTENDED RELEASE ORAL at 08:04

## 2024-04-25 RX ADMIN — FUROSEMIDE 40 MG: 10 INJECTION INTRAMUSCULAR; INTRAVENOUS at 08:04

## 2024-04-25 RX ADMIN — Medication 100 MG: at 03:04

## 2024-04-25 RX ADMIN — ATORVASTATIN CALCIUM 20 MG: 20 TABLET, FILM COATED ORAL at 08:04

## 2024-04-25 NOTE — CONSULTS
Consult for home health noted. Patient is in agreement with no preference. Referral packet and HH order have been sent to [facility] for admission into their CHF program, via [employer].

## 2024-04-25 NOTE — PLAN OF CARE
04/25/24 1450   Discharge Assessment   Assessment Type Discharge Planning Assessment   Confirmed/corrected address, phone number and insurance Yes   Confirmed Demographics Correct on Facesheet   Source of Information patient   When was your last doctors appointment?   (PCP: Geetha Pickens)   Does patient/caregiver understand observation status Yes   Communicated SATURNINO with patient/caregiver Date not available/Unable to determine   Reason For Admission R06.02 SOB (shortness of breath)  N17.9 LUIS EDUARDO (acute kidney injury)  R07.9 Chest pain  I48.91 Atrial fibrillation with RVR  Z87.898 History of alcohol use  I50.9 Acute on chronic congestive heart failure, unspecified heart failure type   People in Home alone   Facility Arrived From: Home   Do you expect to return to your current living situation? Yes   Do you have help at home or someone to help you manage your care at home? Yes   Who are your caregiver(s) and their phone number(s)? MACO Rodriguez, P: [phone]   Prior to hospitilization cognitive status: Alert/Oriented;No Deficits   Current cognitive status: Alert/Oriented;No Deficits   Walking or Climbing Stairs Difficulty no   Dressing/Bathing Difficulty no   Home Accessibility wheelchair accessible   Home Layout Able to live on 1st floor   Equipment Currently Used at Home glucometer;blood pressure machine;other (see comments)  ([employer])   Readmission within 30 days? No   Patient currently being followed by outpatient case management? No   Do you currently have service(s) that help you manage your care at home? No   Do you take prescription medications? Yes  ([facility])   Do you have prescription coverage? Yes   Coverage [employer]   Do you have any problems affording any of your prescribed medications? No   Is the patient taking medications as prescribed? yes   Who is going to help you get home at discharge? Family   How do you get to doctors appointments? car, drives self;family or friend will provide    Are you on dialysis? No   Do you take coumadin? No   Discharge Plan A Home;Home Health   DME Needed Upon Discharge  none   Discharge Plan discussed with: Patient   Transition of Care Barriers None     Patient had a CPAP previously, but it was picked up due to noncompliance; he states that he didn't use it because of a problem with the mask; he will need to have a sleep study done in order to receive another one; CM will follow for DC planning needs.

## 2024-04-25 NOTE — PLAN OF CARE
Problem: Adult Inpatient Plan of Care  Goal: Plan of Care Review  Outcome: Progressing  Goal: Patient-Specific Goal (Individualized)  Outcome: Progressing  Goal: Absence of Hospital-Acquired Illness or Injury  Outcome: Progressing  Goal: Optimal Comfort and Wellbeing  Outcome: Progressing  Goal: Readiness for Transition of Care  Outcome: Progressing     Problem: Bariatric Environmental Safety  Goal: Safety Maintained with Care  Outcome: Progressing     Problem: Diabetes Comorbidity  Goal: Blood Glucose Level Within Targeted Range  Outcome: Progressing     Problem: Acute Kidney Injury/Impairment  Goal: Fluid and Electrolyte Balance  Outcome: Progressing  Goal: Improved Oral Intake  Outcome: Progressing  Goal: Effective Renal Function  Outcome: Progressing

## 2024-04-25 NOTE — CARE UPDATE
Patient reports improvement in SOB overnight. Tolerated CPAP. Reports home CPAP machine broke 3 months ago and was never replaced. Will need to coordinate with PCP. Remains in AF RVR, increasing Toprol from 50 mg to 100 mg daily. Discussed CT Chest findings with Pulmonology. If patient returns to clinical baseline regarding CHF, will need outpatient Pulm Clinic follow up for loculated effusion and lymphadenopathy in 1-2 weeks.    Zeny Ball MD  [facility] Internal Medicine, PRG-2  4/25/2024

## 2024-04-26 VITALS
OXYGEN SATURATION: 98 % | HEART RATE: 111 BPM | DIASTOLIC BLOOD PRESSURE: 89 MMHG | SYSTOLIC BLOOD PRESSURE: 127 MMHG | WEIGHT: 304.25 LBS | HEIGHT: 70 IN | BODY MASS INDEX: 43.56 KG/M2 | RESPIRATION RATE: 16 BRPM | TEMPERATURE: 98 F

## 2024-04-26 LAB
ALBUMIN SERPL-MCNC: 3.1 G/DL (ref 3.4–4.8)
ALBUMIN/GLOB SERPL: 0.7 RATIO (ref 1.1–2)
ALP SERPL-CCNC: 89 UNIT/L (ref 40–150)
ALT SERPL-CCNC: 18 UNIT/L (ref 0–55)
AST SERPL-CCNC: 22 UNIT/L (ref 5–34)
AV INDEX (PROSTH): 0.78
AV MEAN GRADIENT: 2 MMHG
AV PEAK GRADIENT: 3 MMHG
AV VALVE AREA BY VELOCITY RATIO: 2.68 CM²
AV VALVE AREA: 2.35 CM²
AV VELOCITY RATIO: 0.89
BASOPHILS # BLD AUTO: 0.05 X10(3)/MCL
BASOPHILS NFR BLD AUTO: 0.4 %
BILIRUB SERPL-MCNC: 0.6 MG/DL
BSA FOR ECHO PROCEDURE: 2.61 M2
BUN SERPL-MCNC: 24.9 MG/DL (ref 8.4–25.7)
CALCIUM SERPL-MCNC: 9.5 MG/DL (ref 8.8–10)
CHLORIDE SERPL-SCNC: 108 MMOL/L (ref 98–107)
CO2 SERPL-SCNC: 19 MMOL/L (ref 23–31)
CREAT SERPL-MCNC: 1.7 MG/DL (ref 0.73–1.18)
DOP CALC AO PEAK VEL: 0.89 M/S
DOP CALC AO VTI: 13.2 CM
DOP CALC LVOT AREA: 3 CM2
DOP CALC LVOT DIAMETER: 1.96 CM
DOP CALC LVOT PEAK VEL: 0.79 M/S
DOP CALC LVOT STROKE VOLUME: 31.06 CM3
DOP CALC MV VTI: 20.4 CM
DOP CALCLVOT PEAK VEL VTI: 10.3 CM
E WAVE DECELERATION TIME: 143 MSEC
E/A RATIO: 95
E/E' RATIO: 10.56 M/S
EOSINOPHIL # BLD AUTO: 0.23 X10(3)/MCL (ref 0–0.9)
EOSINOPHIL NFR BLD AUTO: 2 %
ERYTHROCYTE [DISTWIDTH] IN BLOOD BY AUTOMATED COUNT: 16.5 % (ref 11.5–17)
GFR SERPLBLD CREATININE-BSD FMLA CKD-EPI: 44 MLS/MIN/1.73/M2
GLOBULIN SER-MCNC: 4.4 GM/DL (ref 2.4–3.5)
GLUCOSE SERPL-MCNC: 108 MG/DL (ref 82–115)
HCT VFR BLD AUTO: 42.4 % (ref 42–52)
HGB BLD-MCNC: 13.9 G/DL (ref 14–18)
HR MV ECHO: 111 BPM
IMM GRANULOCYTES # BLD AUTO: 0.05 X10(3)/MCL (ref 0–0.04)
IMM GRANULOCYTES NFR BLD AUTO: 0.4 %
LEFT ATRIUM SIZE: 4.35 CM
LEFT ATRIUM VOLUME INDEX MOD: 42.6 ML/M2
LEFT ATRIUM VOLUME MOD: 106 CM3
LEFT VENTRICLE DIASTOLIC VOLUME INDEX: 71.49 ML/M2
LEFT VENTRICLE DIASTOLIC VOLUME: 178 ML
LEFT VENTRICLE SYSTOLIC VOLUME INDEX: 43 ML/M2
LEFT VENTRICLE SYSTOLIC VOLUME: 107 ML
LV LATERAL E/E' RATIO: 8.64 M/S
LV SEPTAL E/E' RATIO: 13.57 M/S
LVOT MG: 1.37 MMHG
LVOT MV: 0.54 CM/S
LYMPHOCYTES # BLD AUTO: 2.31 X10(3)/MCL (ref 0.6–4.6)
LYMPHOCYTES NFR BLD AUTO: 19.8 %
MAGNESIUM SERPL-MCNC: 2.1 MG/DL (ref 1.6–2.6)
MCH RBC QN AUTO: 29.7 PG (ref 27–31)
MCHC RBC AUTO-ENTMCNC: 32.8 G/DL (ref 33–36)
MCV RBC AUTO: 90.6 FL (ref 80–94)
MONOCYTES # BLD AUTO: 0.91 X10(3)/MCL (ref 0.1–1.3)
MONOCYTES NFR BLD AUTO: 7.8 %
MV MEAN GRADIENT: 1 MMHG
MV PEAK A VEL: 0.01 M/S
MV PEAK E VEL: 0.95 M/S
MV PEAK GRADIENT: 4 MMHG
MV STENOSIS PRESSURE HALF TIME: 41.47 MS
MV VALVE AREA BY CONTINUITY EQUATION: 1.52 CM2
MV VALVE AREA P 1/2 METHOD: 5.31 CM2
NEUTROPHILS # BLD AUTO: 8.1 X10(3)/MCL (ref 2.1–9.2)
NEUTROPHILS NFR BLD AUTO: 69.6 %
NRBC BLD AUTO-RTO: 0 %
OHS LV EJECTION FRACTION SIMPSONS BIPLANE MOD: 40 %
PHOSPHATE SERPL-MCNC: 4.9 MG/DL (ref 2.3–4.7)
PLATELET # BLD AUTO: 215 X10(3)/MCL (ref 130–400)
PMV BLD AUTO: 12.1 FL (ref 7.4–10.4)
POCT GLUCOSE: 118 MG/DL (ref 70–110)
POCT GLUCOSE: 138 MG/DL (ref 70–110)
POTASSIUM SERPL-SCNC: 3.7 MMOL/L (ref 3.5–5.1)
PROT SERPL-MCNC: 7.5 GM/DL (ref 5.8–7.6)
RA PRESSURE ESTIMATED: 8 MMHG
RBC # BLD AUTO: 4.68 X10(6)/MCL (ref 4.7–6.1)
SINUS: 3.4 CM
SODIUM SERPL-SCNC: 141 MMOL/L (ref 136–145)
TDI LATERAL: 0.11 M/S
TDI SEPTAL: 0.07 M/S
TDI: 0.09 M/S
WBC # SPEC AUTO: 11.65 X10(3)/MCL (ref 4.5–11.5)

## 2024-04-26 PROCEDURE — 94660 CPAP INITIATION&MGMT: CPT

## 2024-04-26 PROCEDURE — 25500020 PHARM REV CODE 255: Performed by: INTERNAL MEDICINE

## 2024-04-26 PROCEDURE — 83735 ASSAY OF MAGNESIUM: CPT | Performed by: STUDENT IN AN ORGANIZED HEALTH CARE EDUCATION/TRAINING PROGRAM

## 2024-04-26 PROCEDURE — 27000221 HC OXYGEN, UP TO 24 HOURS

## 2024-04-26 PROCEDURE — 21400001 HC TELEMETRY ROOM

## 2024-04-26 PROCEDURE — 85025 COMPLETE CBC W/AUTO DIFF WBC: CPT | Performed by: STUDENT IN AN ORGANIZED HEALTH CARE EDUCATION/TRAINING PROGRAM

## 2024-04-26 PROCEDURE — 96376 TX/PRO/DX INJ SAME DRUG ADON: CPT

## 2024-04-26 PROCEDURE — 63600175 PHARM REV CODE 636 W HCPCS: Mod: JZ,JG | Performed by: STUDENT IN AN ORGANIZED HEALTH CARE EDUCATION/TRAINING PROGRAM

## 2024-04-26 PROCEDURE — 80053 COMPREHEN METABOLIC PANEL: CPT | Performed by: STUDENT IN AN ORGANIZED HEALTH CARE EDUCATION/TRAINING PROGRAM

## 2024-04-26 PROCEDURE — 25000003 PHARM REV CODE 250: Performed by: STUDENT IN AN ORGANIZED HEALTH CARE EDUCATION/TRAINING PROGRAM

## 2024-04-26 PROCEDURE — 94761 N-INVAS EAR/PLS OXIMETRY MLT: CPT

## 2024-04-26 PROCEDURE — 36415 COLL VENOUS BLD VENIPUNCTURE: CPT | Performed by: STUDENT IN AN ORGANIZED HEALTH CARE EDUCATION/TRAINING PROGRAM

## 2024-04-26 PROCEDURE — 84100 ASSAY OF PHOSPHORUS: CPT | Performed by: STUDENT IN AN ORGANIZED HEALTH CARE EDUCATION/TRAINING PROGRAM

## 2024-04-26 PROCEDURE — 25000003 PHARM REV CODE 250

## 2024-04-26 PROCEDURE — 99900035 HC TECH TIME PER 15 MIN (STAT)

## 2024-04-26 PROCEDURE — 63600175 PHARM REV CODE 636 W HCPCS: Performed by: STUDENT IN AN ORGANIZED HEALTH CARE EDUCATION/TRAINING PROGRAM

## 2024-04-26 RX ORDER — METOPROLOL SUCCINATE 100 MG/1
100 TABLET, EXTENDED RELEASE ORAL DAILY
Qty: 90 TABLET | Refills: 3 | Status: SHIPPED | OUTPATIENT
Start: 2024-04-27 | End: 2025-04-27

## 2024-04-26 RX ADMIN — HUMAN ALBUMIN MICROSPHERES AND PERFLUTREN 0.44 MG: 10; .22 INJECTION, SOLUTION INTRAVENOUS at 09:04

## 2024-04-26 RX ADMIN — Medication 100 MG: at 08:04

## 2024-04-26 RX ADMIN — LABETALOL HYDROCHLORIDE 10 MG: 5 INJECTION, SOLUTION INTRAVENOUS at 06:04

## 2024-04-26 RX ADMIN — SODIUM CHLORIDE, POTASSIUM CHLORIDE, SODIUM LACTATE AND CALCIUM CHLORIDE 500 ML: 600; 310; 30; 20 INJECTION, SOLUTION INTRAVENOUS at 08:04

## 2024-04-26 RX ADMIN — SACUBITRIL AND VALSARTAN 2 TABLET: 24; 26 TABLET, FILM COATED ORAL at 08:04

## 2024-04-26 RX ADMIN — METOPROLOL SUCCINATE 100 MG: 50 TABLET, FILM COATED, EXTENDED RELEASE ORAL at 08:04

## 2024-04-26 RX ADMIN — FOLIC ACID 1 MG: 1 TABLET ORAL at 08:04

## 2024-04-26 RX ADMIN — RIVAROXABAN 20 MG: 10 TABLET, FILM COATED ORAL at 08:04

## 2024-04-26 NOTE — DISCHARGE SUMMARY
[facility] INTERNAL MEDICINE  INPATIENT DISCHARGE SUMMARY    Admitting Physician: Lyla Saini MD  Attending Physician: Dennis Mckeon MD  Date of Admit: 4/24/2024  Date of Discharge: 4/26/2024    Discharge to: Home or Self Care   Condition: Stable    Discharge Diagnoses     Patient Active Problem List   Diagnosis    Atrial flutter    Hyperlipidemia    Hypertension    Type 2 diabetes mellitus with kidney complication, without long-term current use of insulin    Tobacco user    Subclinical hyperthyroidism    Other male erectile dysfunction    Acute systolic heart failure    AMD (age-related macular degeneration), bilateral    Stage 3a chronic kidney disease    Persistent proteinuria    Chronic pain of both knees    SHADI on CPAP    Class 3 severe obesity due to excess calories with serious comorbidity and body mass index (BMI) of 45.0 to 49.9 in adult    Non-seasonal allergic rhinitis due to pollen    Acute exacerbation of CHF (congestive heart failure)    Atrial fibrillation with RVR    LUIS EDUARDO (acute kidney injury)       Consultants and Procedures     Consultants:  Consults (From admission, onward)          Status Ordering Provider     Inpatient consult to Social Work/Case Management  Once        Provider:  (Not yet assigned)    Completed AZAR BEACH     Inpatient consult to Registered Dietitian/Nutritionist  Once        Provider:  (Not yet assigned)    Completed AZAR BEACH     Inpatient consult to Hospitalist  Once        Provider:  Azar Beach MD    Acknowledged AZAR BEACH             Procedures:   None    Brief History of Present Illness      Jamey Duke is a 63 y.o. male who has a PMH of CHF (EF 40% in 1/2023), CKD, Afib on Xarelto with failed DCCV, and SHADI on CPAP. The patient presented to [facility] ED on 4/24/2024 with a primary complaint of shortness of breath for 3 days. Patient reports he ate crawfish and had multiple beers on Saturday evening and subsequently developed shortness of breath on Sunday.  "Symptoms worsened on Monday but improved Tuesday morning (1 day prior to admission) allowing him to cut grass yesterday. He then states symptoms worsening again overnight with significant orthopnea preventing sleep. He also reports minimal substernal discomfort which remits when he is able to catch his breath. Additional symptoms include lightheadedness and mild occipital headache this morning. Denies fever, chills, n/v/d/c, abdominal pain, dysuria, hematuria, hematochezia, or melena. Reports 100% compliance with medications. Per ED, friend at bedside states patient is not compliance with meds and drank quite a bit of alcohol Sunday while mowing the lawn. Patient smokes "black and mild" cigars a couple times per month, does not use smokeless tobacco, drinks 6-pack of beer on the weekends, denies drug use. Initial vitals T 99.4 F, /107,  (AF RVR on EKG), RR 20, SpO2 96% on RA. Workup significant for elevated BNP 2168, neg troponin, LUIS EDUARDO on CKD (Cr 1.44 from baseline 1.29), NAGMA Bicarb 19, and leukocytosis WBC 14 with left shift. CXR significant for prominent interstitial markings, bilateral pleural effusions, and right mid lung opacity. Admitted to Internal Medicine for management of CHF exacerbation.     Hospital Course with Pertinent Findings     Admitted to inpatient unit for ongoing monitoring and medical therapy on 4/24/2024. Inpatient imaging included: CXR revealing increased interstitial markings, CT chest revealing loculated pleural effusions and congestive process. Hospital course: clinical improvement observed over the course of this hospitalization as evidenced by resolution of shortness of breath and decreased edema in the extremities. He responded well to IV lasix and had good urine output. Metoprolol succinate was increased to 100mg daily to help with lower his heart rate 2/2 A-fib. He states readiness to be discharged home today.     Discharge physical exam:  Vitals:    04/26/24 1027   BP: " (!) 132/98   Pulse: 110   Resp: 16   Temp: 96.5 °F (35.8 °C)       General: Obese w/o distress  HEENT: NC/AT; PERRL; nasal and oral mucosa moist and clear  Pulm: Diminished in lower lobes bilaterally, normal work of breathing on room air  CV: S1, S2 w/o murmurs or gallops; non-pitting edema in BLE noted  GI: Soft with normal bowel sounds in all quadrants, no masses on palpation  MSK: Full ROM of all extremities   Neuro: AAOx3; motor/sensory function intact  Psych: Cooperative; appropriate mood and affect    TIME SPENT ON DISCHARGE: 60 minutes    Discharge Medications        Medication List        CHANGE how you take these medications      metoprolol succinate 100 MG 24 hr tablet  Commonly known as: TOPROL-XL  Take 1 tablet (100 mg total) by mouth once daily.  Start taking on: April 27, 2024  What changed:   medication strength  how much to take            CONTINUE taking these medications      blood sugar diagnostic Strp  Inject 1 strip into the skin 2 (two) times a day.     blood-glucose meter kit     cetirizine 10 MG tablet  Commonly known as: ZYRTEC  Take 1 tablet (10 mg total) by mouth nightly as needed for Allergies or Rhinitis.     * EASY TOUCH TWIST LANCETS 33 gauge Misc  Generic drug: lancets     * LANCETS MISC     empagliflozin 10 mg tablet  Commonly known as: JARDIANCE  Take 1 tablet (10 mg total) by mouth once daily.     ENTRESTO 49-51 mg per tablet  Generic drug: sacubitriL-valsartan  Take 1 tablet by mouth 2 (two) times daily.     fluticasone propionate 50 mcg/actuation nasal spray  Commonly known as: FLONASE  1 spray (50 mcg total) by Each Nostril route once daily.     furosemide 40 MG tablet  Commonly known as: LASIX  Take 1 tablet (40 mg total) by mouth 2 (two) times daily.     hydrALAZINE 50 MG tablet  Commonly known as: APRESOLINE  Take 1 tablet (50 mg total) by mouth 2 (two) times daily.     potassium chloride SA 20 MEQ tablet  Commonly known as: KLOR-CON M20  Take 1 tablet (20 mEq total) by mouth  2 (two) times daily.     pravastatin 40 MG tablet  Commonly known as: PRAVACHOL  Take 1 tablet (40 mg total) by mouth every evening.     XARELTO 20 mg Tab  Generic drug: rivaroxaban  Take 1 tablet (20 mg total) by mouth once daily.           * This list has 2 medication(s) that are the same as other medications prescribed for you. Read the directions carefully, and ask your doctor or other care provider to review them with you.                STOP taking these medications      sildenafiL 50 MG tablet  Commonly known as: VIAGRA               Where to Get Your Medications        These medications were sent to [facility] - [address] [address]      Phone: [phone]   metoprolol succinate 100 MG 24 hr tablet         Discharge Information:     -Discharged with PCP F/U in 1 week with BMP recheck  -Continue outpatient F/U appointment with [facility] cardiology clinic, next appointment is 6/14/2024  -Outpatient pulmonary clinic referral placed for right-sided loculated pleural effusion  -Outpatient sleep study ordered, patient will need sleep study prior to CPAP application  -Instructed patient to reduce daily use of hard liquor and maintain sodium intake <2g daily  -ED precautions provided    Rachel Chance DO, PGY-III  [facility] Internal Medicine

## 2024-04-30 ENCOUNTER — HOSPITAL ENCOUNTER (OUTPATIENT)
Dept: RADIOLOGY | Facility: HOSPITAL | Age: 64
Discharge: HOME | End: 2024-04-30
Attending: NURSE PRACTITIONER
Payer: COMMERCIAL

## 2024-04-30 ENCOUNTER — PATIENT OUTREACH (OUTPATIENT)
Dept: ADMINISTRATIVE | Facility: CLINIC | Age: 64
End: 2024-04-30
Payer: COMMERCIAL

## 2024-04-30 ENCOUNTER — OFFICE VISIT (OUTPATIENT)
Dept: INTERNAL MEDICINE | Facility: CLINIC | Age: 64
End: 2024-04-30
Payer: COMMERCIAL

## 2024-04-30 VITALS
TEMPERATURE: 99 F | SYSTOLIC BLOOD PRESSURE: 121 MMHG | HEIGHT: 70 IN | DIASTOLIC BLOOD PRESSURE: 80 MMHG | HEART RATE: 97 BPM | BODY MASS INDEX: 43.21 KG/M2 | OXYGEN SATURATION: 95 % | WEIGHT: 301.81 LBS

## 2024-04-30 DIAGNOSIS — N17.9 AKI (ACUTE KIDNEY INJURY): ICD-10-CM

## 2024-04-30 DIAGNOSIS — Z72.0 TOBACCO USER: Chronic | ICD-10-CM

## 2024-04-30 DIAGNOSIS — I50.9 ACUTE ON CHRONIC CONGESTIVE HEART FAILURE, UNSPECIFIED HEART FAILURE TYPE: Primary | ICD-10-CM

## 2024-04-30 DIAGNOSIS — I50.9 ACUTE ON CHRONIC CONGESTIVE HEART FAILURE, UNSPECIFIED HEART FAILURE TYPE: ICD-10-CM

## 2024-04-30 PROCEDURE — 71046 X-RAY EXAM CHEST 2 VIEWS: CPT | Mod: TC

## 2024-04-30 PROCEDURE — 99215 OFFICE O/P EST HI 40 MIN: CPT | Mod: PBBFAC,25 | Performed by: NURSE PRACTITIONER

## 2024-04-30 PROCEDURE — 3074F SYST BP LT 130 MM HG: CPT | Mod: CPTII,,, | Performed by: NURSE PRACTITIONER

## 2024-04-30 PROCEDURE — 3044F HG A1C LEVEL LT 7.0%: CPT | Mod: CPTII,,, | Performed by: NURSE PRACTITIONER

## 2024-04-30 PROCEDURE — 1125F AMNT PAIN NOTED PAIN PRSNT: CPT | Mod: CPTII,,, | Performed by: NURSE PRACTITIONER

## 2024-04-30 PROCEDURE — 99496 TRANSJ CARE MGMT HIGH F2F 7D: CPT | Mod: S$PBB,,, | Performed by: NURSE PRACTITIONER

## 2024-04-30 PROCEDURE — 1160F RVW MEDS BY RX/DR IN RCRD: CPT | Mod: CPTII,,, | Performed by: NURSE PRACTITIONER

## 2024-04-30 PROCEDURE — 1101F PT FALLS ASSESS-DOCD LE1/YR: CPT | Mod: CPTII,,, | Performed by: NURSE PRACTITIONER

## 2024-04-30 PROCEDURE — 1159F MED LIST DOCD IN RCRD: CPT | Mod: CPTII,,, | Performed by: NURSE PRACTITIONER

## 2024-04-30 PROCEDURE — 3079F DIAST BP 80-89 MM HG: CPT | Mod: CPTII,,, | Performed by: NURSE PRACTITIONER

## 2024-04-30 PROCEDURE — 1111F DSCHRG MED/CURRENT MED MERGE: CPT | Mod: CPTII,,, | Performed by: NURSE PRACTITIONER

## 2024-04-30 PROCEDURE — 3288F FALL RISK ASSESSMENT DOCD: CPT | Mod: CPTII,,, | Performed by: NURSE PRACTITIONER

## 2024-04-30 NOTE — PROGRESS NOTES
C3 nurse attempted to contact Jamey Leilani  for a TCC post hospital discharge follow up call. No answer. Left voicemail with callback information. The patient has a scheduled HOSFU appointment with  Geetha iPckens FNP (Internal Medicine) on 4/30/2024; at 10:20am

## 2024-04-30 NOTE — PROGRESS NOTES
MARIBELL Irizarry   [facility]  [address] [facility] [address]      PATIENT NAME: Jamey Duke  : 1960  DATE: 24  MRN: [id number]      Reason for Visit / Chief Complaint: Follow-up (Pt was discharged Friday evening he says, Pt states that changed some of his medications, pt states that he feels much better now.)       History of Present Illness / Problem Focused Workflow     Jamey Duke is a 63 y.o. Black or  male presents to the clinic for hospital f/u for CHF exacerbation, A. Fib w/RVR and LUIS EDUARDO. Medical problems include DM, HTN, HLD, h/o persistent AF/AFL, DEEP, and h/o NICMP w/multiple DCCVs -AFL recurred, A. Fib w/RVR, macular degeneration, CKD st 3, proteinuria and ED. Followed by [facility] cardio, urology and nephrology clinics. Has optho visit in [place] on 23 (will sign release when at visit for records).     C3 nurse attempted to contact Jamey Duke  for a TCC post hospital discharge follow up call. No answer. Left voicemail with callback information. The patient has a scheduled HOS appointment with  Geetha Pickens FNP (Internal Medicine) on 2024; at 10:20am         Transitional Care Note    Patient was discharged on 24 from [facility]. TCC attempted to contact pt within 2 business days post discharge on 24 and is documented in patient medical record. A Face to Face office visit is being completed on 2024 with appropriate medical complexity decision making.     Family and/or Caretaker present at visit?  No.  Diagnostic tests reviewed/disposition: No diagnosic tests pending after this hospitalization.  Disease/illness education: Yes  Home health/community services discussion/referrals: Patient has home health established at [facility] .   Establishment or re-establishment of referral orders for community resources: No other necessary community resources.  "  Discussion with other health care providers: No discussion with other health care providers necessary.     Brief History of Present Illness per H&P upon admission    Jamey Duke is a 63 y.o. male who has a PMH of CHF (EF 40% in 1/2023), CKD, Afib on Xarelto with failed DCCV, and SHADI on CPAP. The patient presented to [facility] ED on 4/24/2024 with a primary complaint of shortness of breath for 3 days. Patient reports he ate crawfish and had multiple beers on Saturday evening and subsequently developed shortness of breath on Sunday. Symptoms worsened on Monday but improved Tuesday morning (1 day prior to admission) allowing him to cut grass yesterday. He then states symptoms worsening again overnight with significant orthopnea preventing sleep. He also reports minimal substernal discomfort which remits when he is able to catch his breath. Additional symptoms include lightheadedness and mild occipital headache this morning. Denies fever, chills, n/v/d/c, abdominal pain, dysuria, hematuria, hematochezia, or melena. Reports 100% compliance with medications. Per ED, friend at bedside states patient is not compliance with meds and drank quite a bit of alcohol Sunday while mowing the lawn. Patient smokes "black and mild" cigars a couple times per month, does not use smokeless tobacco, drinks 6-pack of beer on the weekends, denies drug use. Initial vitals T 99.4 F, /107,  (AF RVR on EKG), RR 20, SpO2 96% on RA. Workup significant for elevated BNP 2168, neg troponin, LUIS EDUARDO on CKD (Cr 1.44 from baseline 1.29), NAGMA Bicarb 19, and leukocytosis WBC 14 with left shift. CXR significant for prominent interstitial markings, bilateral pleural effusions, and right mid lung opacity. Admitted to Internal Medicine for management of CHF exacerbation.     Hospital Course with Pertinent Findings      Admitted to inpatient unit for ongoing monitoring and medical therapy on 4/24/2024. Inpatient imaging included: CXR revealing " increased interstitial markings, CT chest revealing loculated pleural effusions and congestive process. Hospital course: clinical improvement observed over the course of this hospitalization as evidenced by resolution of shortness of breath and decreased edema in the extremities. He responded well to IV lasix and had good urine output. Metoprolol succinate was increased to 100mg daily to help with lower his heart rate 2/2 A-fib. He states readiness to be discharged home today.     Discharge Information:      -Discharged with PCP F/U in 1 week with BMP recheck  -Continue outpatient F/U appointment with [facility] cardiology clinic, next appointment is 6/14/2024  -Outpatient pulmonary clinic referral placed for right-sided loculated pleural effusion  -Outpatient sleep study ordered, patient will need sleep study prior to CPAP application  -Instructed patient to reduce daily use of hard liquor and maintain sodium intake <2g daily  -ED precautions provided  -Discontinue viagra  -Increase metoprolol to  mg daily    Today, pt reports he feels better than he has felt in a long time. He does any SOB or edema. States rode exercise bike for 20 mins this morning without distress. Has increased metoprolol as instructed. Has cardio f/u scheduled in June. Pt was referred for sleep study and pulmonary clinic; appts not yet scheduled. Reports med compliance. Denies chest pain, shortness of breath, cough, fever, headache, dizziness, weakness, abdominal pain, nausea, vomiting, diarrhea, constipation, dysuria, depression, anxiety, SI/HI.      Review of Systems     Review of Systems     See HPI for details    Constitutional: Denies Change in appetite. Denies Chills. Denies Fever. Denies Night sweats.  Eye: Denies Blurred vision. Denies Discharge. Denies Eye pain.  ENT: Denies Decreased hearing. Denies Sore throat. Denies Swollen glands.  Respiratory: Denies Cough. Denies Shortness of breath. Denies Shortness of breath with exertion.  Denies Wheezing.  Cardiovascular: Denies Chest pain at rest. Denies Chest pain with exertion. Denies Irregular heartbeat. Denies Palpitations. Denies Edema.  Gastrointestinal: Denies Abdominal pain. Denies Diarrhea. Denies Nausea. Denies Vomiting. Denies Hematemesis or Hematochezia.  Genitourinary: Denies Dysuria. Denies Urinary frequency. Denies Urinary urgency. Denies Blood in urine.  Endocrine: Denies Cold intolerance. Denies Excessive thirst. Denies Heat intolerance. Denies Weight loss. Denies Weight gain.  Musculoskeletal: Denies Weakness.  Integumentary: Denies Rash. Denies Itching. Denies Dry skin.  Neurologic: Denies Dizziness. Denies Fainting. Denies Headache.  Psychiatric: Denies Depression. Denies Anxiety. Denies Suicidal/Homicidal ideations.    All Other ROS: Negative except as stated in HPI.     Medical / Surgical / Social / Family History       -------------------------------------    Atrial flutter    Diabetes mellitus    Hypercholesteremia    Hypertension    Morbid obesity    Obese    Tobacco user        Past Surgical History:   Procedure Laterality Date    ANGIOGRAM, CORONARY, WITH LEFT HEART CATHETERIZATION N/A 07/29/2022    Procedure: Angiogram, Coronary, with Left Heart Cath;  Surgeon: Yossi Zuniga MD;  Location: [facility] CATH LAB;  Service: Cardiology;  Laterality: N/A;    Cardioversion x 2      COLONOSCOPY  10/24/2023    Comprehensive electrophysiologic eval. including insertion & repositioning of multiple electrode catheters with induction or attempted induction of arrhythmias with left atrial pacing & recordingfrom coronary sinus or left atrium  07/10/2018    comprehensive electrophysiologic eval. insertion & repositioning multiple electrode catheters with induction or attempted induction of an arrhythmia with right atrial pacing and recording, right ventricular pacing and recording  07/10/2018    Destruction of Conduction Mechanism, Percutaneous Approach  07/10/2018       Social History      Socioeconomic History    Marital status: Single   Occupational History    Occupation: retired   Tobacco Use    Smoking status: Some Days     Types: Cigars     Passive exposure: Current    Smokeless tobacco: Never    Tobacco comments:     Occasionally, last smoked 4/20/24   Substance and Sexual Activity    Alcohol use: Yes     Alcohol/week: 6.0 standard drinks of alcohol     Types: 6 Cans of beer per week     Comment: Drinks occasionally beer on weekends    Drug use: Never    Sexual activity: Yes     Partners: Female     Social Determinants of Health     Financial Resource Strain: Medium Risk (10/21/2023)    Overall Financial Resource Strain (CARDIA)     Difficulty of Paying Living Expenses: Somewhat hard   Transportation Needs: Unmet Transportation Needs (9/19/2022)    PRAPARE - Transportation     Lack of Transportation (Medical): Yes     Lack of Transportation (Non-Medical): No   Physical Activity: Insufficiently Active (10/21/2023)    Exercise Vital Sign     Days of Exercise per Week: 3 days     Minutes of Exercise per Session: 30 min   Stress: Stress Concern Present (10/21/2023)    [employer] - Occupational Stress Questionnaire     Feeling of Stress : To some extent   Housing Stability: Low Risk  (9/19/2022)    Housing Stability Vital Sign     Unable to Pay for Housing in the Last Year: No     Number of Places Lived in the Last Year: 1     Unstable Housing in the Last Year: No        Family History   Problem Relation Name Age of Onset    Hypertension Mother      Valvular heart disease Father          Medications and Allergies     Medications  Current Outpatient Medications   Medication Instructions    blood sugar diagnostic Strp 1 strip, Subcutaneous, 2 times daily    blood-glucose meter kit   True Metrix Glucometer, See Instructions, Use to check blood glucose daily, # 1 EA, 0 Refill(s), Pharmacy: [facility], 177.8, cm, Height/Length Dosing, 01/12/22 9:55:00  "CST, 148.4, kg, Weight Dosing, 01/12/22 9:55:00 CST    cetirizine (ZYRTEC) 10 mg, Oral, Nightly PRN    EASY TOUCH TWIST LANCETS 33 gauge Misc USE AS DIRECTED TO CHECK BLOOD GLUCOSE DAILY    empagliflozin (JARDIANCE) 10 mg, Oral, Daily    fluticasone propionate (FLONASE) 50 mcg, Each Nostril, Daily    furosemide (LASIX) 40 mg, Oral, 2 times daily    hydrALAZINE (APRESOLINE) 50 mg, Oral, 2 times daily    LANCETS MISC   True Metrix Lancets, See Instructions, Use to check blood glucose daily, # 100 EA, 11 Refill(s), Pharmacy: [facility], 177.8, cm, Height/Length Dosing, 01/12/22 9:55:00 CST, 148.4, kg, Weight Dosing, 01/12/22 9:55:00 CST    metoprolol succinate (TOPROL-XL) 100 mg, Oral, Daily    potassium chloride SA (KLOR-CON M20) 20 MEQ tablet 20 mEq, Oral, 2 times daily    pravastatin (PRAVACHOL) 40 mg, Oral, Nightly    sacubitriL-valsartan (ENTRESTO) 49-51 mg per tablet 1 tablet, Oral, 2 times daily    XARELTO 20 mg, Oral, Daily         Allergies  Review of patient's allergies indicates:  No Known Allergies    Physical Examination     /80 (BP Location: Left arm, Patient Position: Sitting, BP Method: Large (Automatic))   Pulse 97   Temp 98.5 °F (36.9 °C) (Oral)   Ht 5' 10" (1.778 m)   Wt (!) 136.9 kg (301 lb 12.8 oz)   SpO2 95%   BMI 43.30 kg/m²     Physical Exam  Constitutional:       Appearance: He is morbidly obese.   Cardiovascular:      Rate and Rhythm: Rhythm irregularly irregular.         General: Alert and oriented, No acute distress.  Head: Normocephalic, Atraumatic.  Eye: Pupils are equal, round and reactive to light, Extraocular movements are intact, Sclera non-icteric.  Ears/Nose/Throat: Normal, Mucosa moist,Clear.  Neck/Thyroid: Supple, Non-tender, No carotid bruit, No lymphadenopathy, No JVD, Full range of motion.  Respiratory: Clear to auscultation bilaterally; No wheezes, rales or rhonchi,Non-labored respirations, Symmetrical chest wall expansion.  Cardiovascular: " S1/S2 normal, No murmurs, rubs or gallops.  Gastrointestinal: Soft, Non-tender, Non-distended, Normal bowel sounds, No palpable organomegaly.  Integumentary: Warm, Dry, Intact, No suspicious lesions or rashes.  Extremities: No clubbing, cyanosis or edema  Neurologic: No focal deficits, Cranial Nerves II-XII are grossly intact, Motor strength normal upper and lower extremities, Sensory exam intact.  Psychiatric: Normal interaction, Coherent speech, Appropriate affect       Results     Lab Results   Component Value Date    WBC 11.65 (H) 04/26/2024    HGB 13.9 (L) 04/26/2024    HCT 42.4 04/26/2024     04/26/2024    CHOL 165 07/26/2023    TRIG 131 07/26/2023    ALT 16 04/30/2024    AST 17 04/30/2024     04/30/2024    K 4.8 04/30/2024    CREATININE 1.56 (H) 04/30/2024    BUN 20.9 04/30/2024    CO2 24 04/30/2024    TSH 2.1907 05/12/2022    PSA 0.40 04/30/2024    INR 1.6 (H) 07/26/2022    HGBA1C 5.5 04/30/2024     X-Ray Chest AP Portable  Order: [id number]  Status: Final result       Visible to patient: No (inaccessible in Patient Portal)       Next appt: 05/17/2024 at 08:30 AM in Nephrology (Nia Moreno MD)    0 Result Notes  Details    Reading Physician Reading Date Result Priority   Cedrick Padilla MD  [phone] 4/24/2024 STAT     Narrative & Impression  EXAMINATION:  XR CHEST AP PORTABLE     CLINICAL HISTORY:  Chest Pain;     TECHNIQUE:  One     COMPARISON:  None available.     FINDINGS:  Cardiopericardial silhouette is enlarged.  Lungs interstitial markings prominence reflect mild to moderate congestive process.  There is right mid lung zone oblong configuration configuration opacity which may represent fluid within the interlobar fissure.  Follow-up exams are recommended to exclude the possibility of a mass lesion.  There also bilateral dependent pleural effusions.  Left lower lung lobe retrocardiac area is rather obscured.  No pneumothorax.     Impression:     As above.         Electronically signed by:Cedrick Padilla  Date:                                            04/24/2024  Time:                                           08:02     CT Chest Without Contrast  Order: [id number]  Status: Final result       Visible to patient: No (inaccessible in Patient Portal)       Next appt: 05/17/2024 at 08:30 AM in Nephrology (Nia Moreno MD)    0 Result Notes  Details    Reading Physician Reading Date Result Priority   Cedrick Padilla MD  [phone] 4/24/2024 STAT     Narrative & Impression  EXAMINATION:  CT CHEST WITHOUT CONTRAST     CLINICAL HISTORY:  Respiratory illness, nondiagnostic xray;     TECHNIQUE:  Multidetector axial images were performed from thoracic inlet to below hemidiaphragms without intravenous contrast administration. Sagittal and coronal reformations performed.     Dose length product of 469 mGycm. Automated exposure control was utilized to minimize radiation dose.     COMPARISON:  Chest radiograph April 24, 2024.     FINDINGS:  There is bilobed appearance of the right superior accessory and horizontal minor fissure by fluid accumulation best seen on the sagittal image 60 series 6.  There is also minimal fluid within the right oblique major fissure.  Small amount of pleural effusion is also seen within the right dependent pleural space.  There is also minimal fluid within the left interlobar fissure and the dependent pleural space.  These cause lungs mild compressive atelectasis.  There is no pericardial effusion.     Bilateral lungs ground-glass opacities may be related to small airway disease versus slight congestive process.  There is no focally dense consolidation.  There are mild peripheral subpleural reticulations related to fibrosis.  No cystic formations, honeycombing or bronchiectasis.     There are mediastinal enlarged lymph nodes.  Aorticopulmonary window enlarged lymph node is 2.2 cm on image 35 and paratracheal 2.0 cm on image 29 series 2.  Thoracic aorta  is without aneurysmal dilatation.  Cardiac chamber size is within normal limits.     Thoracic spine mild degenerative changes.  No acute or aggressive skeletal abnormality     Impression:     1.  Loculated trapped pleural effusion in the pleural fissures and also in the dependent pleural spaces, right more pronounced compared to the left.  Attention to follow-up exams.     2.  Lungs ground-glass opacities may be related to small airway disease or mild congestive process.     3.  Nonspecific mediastinal lymphadenopathy.        Electronically signed by:Cedrick Padilla  Date:                                            04/24/2024  Time:                                           11:20    US Echo  Order: [id number]  Status: Final result       Visible to patient: No (inaccessible in Patient Portal)       Next appt: 05/17/2024 at 08:30 AM in Nephrology (Nia Moreno MD)    0 Result Notes  Details    Narrative  Andrea Fuentes MD     4/24/2024  9:04 AM  ED US Echo    Date/Time: 4/24/2024 7:28 AM    Performed by: Cristiana Zamora FNP  Authorized by: Jayce Mehta MD    Indication:  Dyspnea  Identified Structures:    The pericardial sac, myocardium, and 4 chambers were identified with the  following echocardiographic windows:  Parasternal long axis and IVC  Findings:    Pericardial Effusion:  Absent    Left Ventricle Ejection Fraction:  Moderately reduced (35-55%)  IVC:    Diameter:  > 2cm    Collapsibility:  < 50%    Gross [place] (RV:LV):  Normal    Impression:  Diminished LV function    Charge?:  No (educational)            Last Resulted: 04/24/24 07:28 CDT         X-Ray Chest PA And Lateral  Order: [id number]  Status: Final result       Visible to patient: No (inaccessible in Patient Portal)       Next appt: 05/17/2024 at 08:30 AM in Nephrology (Nia Moreno MD)       Dx: Acute on chronic congestive heart cheng...    0 Result Notes  Details    Reading Physician Reading Date Result  Priority   Eric Younger MD  [phone] 4/30/2024 Routine     Narrative & Impression  EXAMINATION:  Chest two views     CLINICAL HISTORY:  Acute on chronic congestive heart failure     COMPARISON:  04/24/2024     FINDINGS:  Stable cardiac enlargement.  There is decreased central vascular enlargement interstitial opacities.  There is slight interval decrease of loculated fluid along the right horizontal fissure..  There is stable left basilar opacity.  No visible pneumothorax no interval consolidation.     Impression:     Slight interval improvement with decreased central vascular congestion and pulmonary edema.     Interval decreased size loculated pleural fluid along the right horizontal fissure.        Electronically signed by:Eric Younger MD  Date:                                            04/30/2024  Time:                                           11:18       Assessment and Plan (including Health Maintenance)     Plan:     1. Acute on chronic congestive heart failure, unspecified heart failure type  Repeat CXR and BMP ordered.  Continue lasix, metoprolol and entresto.   Keep Cardiology Clinic appts as scheduled.  Notify the clinic if you gain 3 or more pounds in one day or 5 or more pounds in one week.  Stressed importance of daily morning weights after urination but prior to breakfast on the same scale.  Low Sodium Diet (Dash Diet - less than 2 grams of sodium per day).  Follow a low cholesterol, low saturated fat diet with less that 200mg of cholesterol a day.  Cut down on alcohol if you have more than 1 drink a day (for women) or 2 drinks a day (for men).  Maintain healthy weight with goal BMI <30. Perform 30 minutes of daily physical activity 5 days per week.  Report to ER for chest pain, SOB, difficulty breathing, abdominal distention or significant edema to lower extremities.    - X-Ray Chest PA And Lateral; Future  - Basic Metabolic Panel; Future    2. LUIS EDUARDO (acute kidney injury)  Repeat BMP  ordered.  Keep nephrology as scheduled.  Will contact if indicated.    3. A.Fib w/RVR  Keep cardio appts as scheduled.  Continue xarelto and metoprolol.  ED precautions.      Problem List Items Addressed This Visit          Cardiac/Vascular    Acute exacerbation of CHF (congestive heart failure) - Primary    Relevant Orders    X-Ray Chest PA And Lateral (Completed)    Basic Metabolic Panel       Renal/    LUIS EDUARDO (acute kidney injury)       Other    Tobacco user (Chronic)         Health Maintenance Due   Topic Date Due    RSV Vaccine (Age 60+ and Pregnant patients) (1 - 1-dose 60+ series) Never done    Eye Exam  05/12/2023    COVID-19 Vaccine (3 - 2023-24 season) 09/01/2023    Abdominal Aortic Aneurysm Screening  Never done       Follow up if symptoms worsen or fail to improve, for Keep appt as scheduled in July with labs one week prior .        Signature:  MARIBELL Irizarry  [facility]  [address] [facility] [address]

## 2024-05-01 NOTE — PROGRESS NOTES
C3 nurse made second attempt to contact Jamey Duke for a TCC post hospital discharge follow up call.

## 2024-05-02 NOTE — PROGRESS NOTES
C3 nurse made third attempt to contact Jamey Duke for a TCC post hospital discharge follow up call.

## 2024-05-03 DIAGNOSIS — J90 PLEURAL EFFUSION, NOT ELSEWHERE CLASSIFIED: Primary | ICD-10-CM

## 2024-05-03 DIAGNOSIS — R91.1 SOLITARY PULMONARY NODULE: ICD-10-CM

## 2024-05-13 ENCOUNTER — TELEPHONE (OUTPATIENT)
Dept: INTERNAL MEDICINE | Facility: CLINIC | Age: 64
End: 2024-05-13
Payer: COMMERCIAL

## 2024-05-15 NOTE — TELEPHONE ENCOUNTER
Spoke with Ms. Galvan she said that the pt states that he is feeling better. She said the the pt states that he compliant with his medications. Pt states that he tried to make an appt but could not get through. Advise pt to call back tomorrow for appt

## 2024-05-17 ENCOUNTER — OFFICE VISIT (OUTPATIENT)
Dept: NEPHROLOGY | Facility: CLINIC | Age: 64
End: 2024-05-17
Payer: COMMERCIAL

## 2024-05-17 DIAGNOSIS — R80.1 PERSISTENT PROTEINURIA: ICD-10-CM

## 2024-05-17 DIAGNOSIS — N18.31 STAGE 3A CHRONIC KIDNEY DISEASE: Primary | ICD-10-CM

## 2024-05-17 RX ORDER — TESTOSTERONE CYPIONATE 200 MG/ML
200 INJECTION, SOLUTION INTRAMUSCULAR
COMMUNITY
Start: 2024-05-15

## 2024-05-17 NOTE — PROGRESS NOTES
[facility] Telemedicine Note    Chief Complaint   Patient presents with    Follow-up     Audio, has been drinking water and Powerade; was in hospital 2 weeks ago-Afib, /92, DBP elevation, dyspnea while working, low testosterone        History of Present Illness    This is a 63 y.o. Black or  male with past medical history of HTN, HLD, DM, CKD 3a, NICM, HFrEF (EF 40%), and persistent proteinuria, who is following up for persistent proteinuria. Recently hospitalized from 4/24 to 4/26 for SOB and BLE swelling, and found to be in CHF exacerbation and afib rvr. Given IV diuretics and BLE swelling improved. Home toprol dose increased to 100 mg. Since discharge he has been doing well and has no complaints today other than elevated diastolic BP at few readings per week. HR has been in the 70's to 80's. Denies any hypotensive episodes. Does not have BP log. Has home health.    Review of Systems  Twelve point review of systems conducted, negative except as stated in history of present illness.    Review of patient's allergies indicates:  No Known Allergies      Past Medical History:   Past Medical History:   Diagnosis Date    Atrial flutter     Diabetes mellitus     Hypercholesteremia     Hypertension     Morbid obesity     Obese     Tobacco user        Procedure History:   Past Surgical History:   Procedure Laterality Date    ANGIOGRAM, CORONARY, WITH LEFT HEART CATHETERIZATION N/A 07/29/2022    Procedure: Angiogram, Coronary, with Left Heart Cath;  Surgeon: Yossi Zuniga MD;  Location: [facility] CATH LAB;  Service: Cardiology;  Laterality: N/A;    Cardioversion x 2      COLONOSCOPY  10/24/2023    Comprehensive electrophysiologic eval. including insertion & repositioning of multiple electrode catheters with induction or attempted induction of arrhythmias with left atrial pacing & recordingfrom coronary sinus or left atrium  07/10/2018    comprehensive electrophysiologic eval. insertion &  repositioning multiple electrode catheters with induction or attempted induction of an arrhythmia with right atrial pacing and recording, right ventricular pacing and recording  07/10/2018    Destruction of Conduction Mechanism, Percutaneous Approach  07/10/2018       Family History: family history includes Hypertension in his mother; Valvular heart disease in his father.    Social History:  reports that he has been smoking cigars. He has been exposed to tobacco smoke. He has never used smokeless tobacco. He reports current alcohol use of about 6.0 standard drinks of alcohol per week. He reports that he does not use drugs.    Physical Exam:   Unable to do - given phone interview    Home Medications:  Prior to Admission medications    Medication Sig Start Date End Date Taking? Authorizing Provider   blood sugar diagnostic Strp Inject 1 strip into the skin 2 (two) times a day. 5/12/22  Yes Geetha Pickens FNP   blood-glucose meter kit   True Metrix Glucometer, See Instructions, Use to check blood glucose daily, # 1 EA, 0 Refill(s), Pharmacy: [facility], 177.8, cm, Height/Length Dosing, 01/12/22 9:55:00 CST, 148.4, kg, Weight Dosing, 01/12/22 9:55:00 CST 1/14/22  Yes Provider, Historical   EASY TOUCH TWIST LANCETS 33 gauge Misc USE AS DIRECTED TO CHECK BLOOD GLUCOSE DAILY 1/14/22  Yes Provider, Historical   empagliflozin (JARDIANCE) 10 mg tablet Take 1 tablet (10 mg total) by mouth once daily. 9/15/23 9/14/24 Yes Devan, Laterica L, FNP   furosemide (LASIX) 40 MG tablet Take 1 tablet (40 mg total) by mouth 2 (two) times daily. 3/14/23 3/13/24 Yes Devan, Laterica L, FNP   hydrALAZINE (APRESOLINE) 50 MG tablet Take 1 tablet (50 mg total) by mouth 2 (two) times daily. 3/14/23 3/13/24 Yes Devan, Laterica L, FNP   KLOR-CON M20 20 mEq tablet Take 1 tablet (20 mEq total) by mouth once daily. 3/14/23 3/13/24 Yes Devan, Laterica L, FNP   LANCETS MISC   True Metrix Lancets, See Instructions, Use to  check blood glucose daily, # 100 EA, 11 Refill(s), Pharmacy: [facility], 177.8, cm, Height/Length Dosing, 01/12/22 9:55:00 CST, 148.4, kg, Weight Dosing, 01/12/22 9:55:00 CST 1/14/22  Yes Provider, Historical   metFORMIN (GLUCOPHAGE-XR) 500 MG ER 24hr tablet Take 1 tablet (500 mg total) by mouth once daily. 1/26/23  Yes Geetha Pickens FNP   metoprolol succinate (TOPROL-XL) 50 MG 24 hr tablet Take 1 tablet (50 mg total) by mouth once daily. 3/14/23 3/13/24 Yes Devan Laterica L, FNP   pravastatin (PRAVACHOL) 40 MG tablet Take 1 tablet (40 mg total) by mouth every evening. 3/14/23 3/13/24 Yes Devan Laterica L, FNP   sacubitriL-valsartan (ENTRESTO) 49-51 mg per tablet Take 1 tablet by mouth 2 (two) times daily. 12/11/23 12/10/24 Yes Devan Laterica L, FNP   sildenafiL (VIAGRA) 50 MG tablet Take 1 tablet (50 mg total) by mouth daily as needed for Erectile Dysfunction. 9/5/23 9/4/24 Yes Yossi Abreu, ALMA   XARELTO 20 mg Tab Take 1 tablet (20 mg total) by mouth once daily. 9/15/23 9/14/24 Yes Devan Laterica L, FNP       Impression:   Problem List Items Addressed This Visit          Renal/    Stage 3a chronic kidney disease - Primary (Chronic)    Relevant Orders    CBC Auto Differential    Comprehensive Metabolic Panel    Magnesium    Phosphorus    Protein / creatinine ratio, urine    PTH, Intact    Urinalysis    Persistent proteinuria (Chronic)    Relevant Orders    Comprehensive Metabolic Panel    Protein / creatinine ratio, urine         Plan:   - Deferring BP medication adjust to cardiology, continue current medications  - Recommend BP log   - A1C 5.5 on 4/20/24. Continue Metformin. Obtain protein/creatinine ratio  - previous labs including MM, MGUS, UPC, SPEP, XIANG, ANCA, Renal US unrevealing  - proteinuria likely secondary to diabetic nephropathy  - BUN/Cr of 20.9/1.56, eGFR of 49 - has been stable     Medication review has been conducted, appropriate adjustments for decreased e-GFR  have been made.     Continue following measures:  -follow 2 gram a day dietary sodium restriction  -control diabetes (goal A1c less than 7%)  -control high blood pressure (goal blood pressure is less than 130/80, please check blood pressure twice a week and bring blood pressure logs to office visit)  -exercise at least 30 minutes a day, 5 days a week  -maintain healthy weight  -decrease or stop alcohol use  -do not smoke  -stay well hydrated (drink water only, avoid juices, sweet tea, and sodas)  -ask about staying up-to-date on vaccinations (flu vaccine, pneumonia vaccine, hepatitis B vaccine)  -avoid excessive use of NSAIDs (ibuprofen, naproxen, Aleve, Advil, Toradol, Mobic), take Tylenol as needed for headache or mild pain  -take cholesterol lowering medications if prescribed (LDL goal less than 100)    Follow-up with the primary care provider as scheduled.  Return to [facility]ph[facility] (kidney) clinic with routine labs in 5 months       Alverto Easley DO  [facility] PGY-II  [facility]

## 2024-05-25 ENCOUNTER — EXTERNAL HOME HEALTH (OUTPATIENT)
Dept: HOME HEALTH SERVICES | Facility: HOSPITAL | Age: 64
End: 2024-05-25
Payer: COMMERCIAL

## 2024-05-29 ENCOUNTER — TELEPHONE (OUTPATIENT)
Dept: INTERNAL MEDICINE | Facility: CLINIC | Age: 64
End: 2024-05-29
Payer: COMMERCIAL

## 2024-06-10 ENCOUNTER — TELEPHONE (OUTPATIENT)
Dept: INTERNAL MEDICINE | Facility: CLINIC | Age: 64
End: 2024-06-10
Payer: COMMERCIAL

## 2024-06-11 ENCOUNTER — OFFICE VISIT (OUTPATIENT)
Dept: CARDIOLOGY | Facility: CLINIC | Age: 64
End: 2024-06-11
Payer: COMMERCIAL

## 2024-06-11 ENCOUNTER — HOSPITAL ENCOUNTER (INPATIENT)
Facility: HOSPITAL | Age: 64
LOS: 3 days | Discharge: HOME HEALTH | DRG: 291 | End: 2024-06-14
Attending: EMERGENCY MEDICINE | Admitting: INTERNAL MEDICINE
Payer: COMMERCIAL

## 2024-06-11 VITALS
DIASTOLIC BLOOD PRESSURE: 96 MMHG | HEIGHT: 70 IN | BODY MASS INDEX: 43.95 KG/M2 | HEART RATE: 88 BPM | WEIGHT: 307 LBS | RESPIRATION RATE: 20 BRPM | TEMPERATURE: 98 F | SYSTOLIC BLOOD PRESSURE: 134 MMHG | OXYGEN SATURATION: 98 %

## 2024-06-11 DIAGNOSIS — R06.00 DYSPNEA, UNSPECIFIED TYPE: ICD-10-CM

## 2024-06-11 DIAGNOSIS — E66.01 CLASS 3 SEVERE OBESITY DUE TO EXCESS CALORIES WITH SERIOUS COMORBIDITY AND BODY MASS INDEX (BMI) OF 45.0 TO 49.9 IN ADULT: Chronic | ICD-10-CM

## 2024-06-11 DIAGNOSIS — G47.33 OSA ON CPAP: ICD-10-CM

## 2024-06-11 DIAGNOSIS — N18.31 STAGE 3A CHRONIC KIDNEY DISEASE: Chronic | ICD-10-CM

## 2024-06-11 DIAGNOSIS — I50.23 ACUTE ON CHRONIC SYSTOLIC CONGESTIVE HEART FAILURE: Primary | ICD-10-CM

## 2024-06-11 DIAGNOSIS — Z72.0 TOBACCO USER: Chronic | ICD-10-CM

## 2024-06-11 DIAGNOSIS — I48.91 ATRIAL FIBRILLATION, UNSPECIFIED TYPE: ICD-10-CM

## 2024-06-11 DIAGNOSIS — I50.21 ACUTE SYSTOLIC HEART FAILURE: Chronic | ICD-10-CM

## 2024-06-11 DIAGNOSIS — E78.2 MIXED HYPERLIPIDEMIA: Chronic | ICD-10-CM

## 2024-06-11 DIAGNOSIS — I10 PRIMARY HYPERTENSION: Chronic | ICD-10-CM

## 2024-06-11 DIAGNOSIS — N17.9 AKI (ACUTE KIDNEY INJURY): ICD-10-CM

## 2024-06-11 DIAGNOSIS — R53.1 WEAKNESS: ICD-10-CM

## 2024-06-11 DIAGNOSIS — I48.91 A-FIB: ICD-10-CM

## 2024-06-11 DIAGNOSIS — I48.91 ATRIAL FIBRILLATION WITH RVR: ICD-10-CM

## 2024-06-11 DIAGNOSIS — R07.9 CHEST PAIN: ICD-10-CM

## 2024-06-11 DIAGNOSIS — R07.9 CHEST PAIN, UNSPECIFIED TYPE: ICD-10-CM

## 2024-06-11 DIAGNOSIS — I15.9 SECONDARY HYPERTENSION: Primary | ICD-10-CM

## 2024-06-11 LAB
ALBUMIN SERPL-MCNC: 3.4 G/DL (ref 3.4–4.8)
ALBUMIN/GLOB SERPL: 0.8 RATIO (ref 1.1–2)
ALP SERPL-CCNC: 105 UNIT/L (ref 40–150)
ALT SERPL-CCNC: 48 UNIT/L (ref 0–55)
ANION GAP SERPL CALC-SCNC: 11 MEQ/L
AST SERPL-CCNC: 39 UNIT/L (ref 5–34)
BASOPHILS # BLD AUTO: 0.07 X10(3)/MCL
BASOPHILS NFR BLD AUTO: 0.6 %
BILIRUB SERPL-MCNC: 1.2 MG/DL
BNP BLD-MCNC: 2646.1 PG/ML
BUN SERPL-MCNC: 32.6 MG/DL (ref 8.4–25.7)
CALCIUM SERPL-MCNC: 9.5 MG/DL (ref 8.8–10)
CHLORIDE SERPL-SCNC: 109 MMOL/L (ref 98–107)
CO2 SERPL-SCNC: 20 MMOL/L (ref 23–31)
CREAT SERPL-MCNC: 2.08 MG/DL (ref 0.73–1.18)
CREAT/UREA NIT SERPL: 16
EOSINOPHIL # BLD AUTO: 0.07 X10(3)/MCL (ref 0–0.9)
EOSINOPHIL NFR BLD AUTO: 0.6 %
ERYTHROCYTE [DISTWIDTH] IN BLOOD BY AUTOMATED COUNT: 17.7 % (ref 11.5–17)
GFR SERPLBLD CREATININE-BSD FMLA CKD-EPI: 35 ML/MIN/1.73/M2
GLOBULIN SER-MCNC: 4.3 GM/DL (ref 2.4–3.5)
GLUCOSE SERPL-MCNC: 104 MG/DL (ref 82–115)
HCT VFR BLD AUTO: 41.5 % (ref 42–52)
HGB BLD-MCNC: 13.7 G/DL (ref 14–18)
HOLD SPECIMEN: NORMAL
IMM GRANULOCYTES # BLD AUTO: 0.05 X10(3)/MCL (ref 0–0.04)
IMM GRANULOCYTES NFR BLD AUTO: 0.4 %
LYMPHOCYTES # BLD AUTO: 2.35 X10(3)/MCL (ref 0.6–4.6)
LYMPHOCYTES NFR BLD AUTO: 18.8 %
MAGNESIUM SERPL-MCNC: 2.2 MG/DL (ref 1.6–2.6)
MCH RBC QN AUTO: 30.6 PG (ref 27–31)
MCHC RBC AUTO-ENTMCNC: 33 G/DL (ref 33–36)
MCV RBC AUTO: 92.8 FL (ref 80–94)
MONOCYTES # BLD AUTO: 0.78 X10(3)/MCL (ref 0.1–1.3)
MONOCYTES NFR BLD AUTO: 6.3 %
NEUTROPHILS # BLD AUTO: 9.15 X10(3)/MCL (ref 2.1–9.2)
NEUTROPHILS NFR BLD AUTO: 73.3 %
NRBC BLD AUTO-RTO: 0.4 %
OHS QRS DURATION: 84 MS
OHS QRS DURATION: 88 MS
OHS QTC CALCULATION: 405 MS
OHS QTC CALCULATION: 470 MS
PLATELET # BLD AUTO: 264 X10(3)/MCL (ref 130–400)
PMV BLD AUTO: 12.2 FL (ref 7.4–10.4)
POCT GLUCOSE: 125 MG/DL (ref 70–110)
POTASSIUM SERPL-SCNC: 4.2 MMOL/L (ref 3.5–5.1)
PROT SERPL-MCNC: 7.7 GM/DL (ref 5.8–7.6)
RBC # BLD AUTO: 4.47 X10(6)/MCL (ref 4.7–6.1)
SODIUM SERPL-SCNC: 140 MMOL/L (ref 136–145)
TROPONIN I SERPL-MCNC: 0.03 NG/ML (ref 0–0.04)
WBC # SPEC AUTO: 12.47 X10(3)/MCL (ref 4.5–11.5)

## 2024-06-11 PROCEDURE — 3288F FALL RISK ASSESSMENT DOCD: CPT | Mod: CPTII,,, | Performed by: NURSE PRACTITIONER

## 2024-06-11 PROCEDURE — 25000003 PHARM REV CODE 250: Performed by: EMERGENCY MEDICINE

## 2024-06-11 PROCEDURE — 3008F BODY MASS INDEX DOCD: CPT | Mod: CPTII,,, | Performed by: NURSE PRACTITIONER

## 2024-06-11 PROCEDURE — 85025 COMPLETE CBC W/AUTO DIFF WBC: CPT | Performed by: EMERGENCY MEDICINE

## 2024-06-11 PROCEDURE — 25000003 PHARM REV CODE 250

## 2024-06-11 PROCEDURE — 99214 OFFICE O/P EST MOD 30 MIN: CPT | Mod: S$PBB,,, | Performed by: NURSE PRACTITIONER

## 2024-06-11 PROCEDURE — 5A09357 ASSISTANCE WITH RESPIRATORY VENTILATION, LESS THAN 24 CONSECUTIVE HOURS, CONTINUOUS POSITIVE AIRWAY PRESSURE: ICD-10-PCS | Performed by: INTERNAL MEDICINE

## 2024-06-11 PROCEDURE — 94660 CPAP INITIATION&MGMT: CPT

## 2024-06-11 PROCEDURE — 21400001 HC TELEMETRY ROOM

## 2024-06-11 PROCEDURE — 93005 ELECTROCARDIOGRAM TRACING: CPT

## 2024-06-11 PROCEDURE — 27000190 HC CPAP FULL FACE MASK W/VALVE

## 2024-06-11 PROCEDURE — 83880 ASSAY OF NATRIURETIC PEPTIDE: CPT | Performed by: EMERGENCY MEDICINE

## 2024-06-11 PROCEDURE — 96374 THER/PROPH/DIAG INJ IV PUSH: CPT

## 2024-06-11 PROCEDURE — 1160F RVW MEDS BY RX/DR IN RCRD: CPT | Mod: CPTII,,, | Performed by: NURSE PRACTITIONER

## 2024-06-11 PROCEDURE — 99900035 HC TECH TIME PER 15 MIN (STAT)

## 2024-06-11 PROCEDURE — 84484 ASSAY OF TROPONIN QUANT: CPT | Performed by: EMERGENCY MEDICINE

## 2024-06-11 PROCEDURE — 80053 COMPREHEN METABOLIC PANEL: CPT | Performed by: EMERGENCY MEDICINE

## 2024-06-11 PROCEDURE — 99215 OFFICE O/P EST HI 40 MIN: CPT | Mod: PBBFAC | Performed by: NURSE PRACTITIONER

## 2024-06-11 PROCEDURE — 1159F MED LIST DOCD IN RCRD: CPT | Mod: CPTII,,, | Performed by: NURSE PRACTITIONER

## 2024-06-11 PROCEDURE — 3066F NEPHROPATHY DOC TX: CPT | Mod: CPTII,,, | Performed by: NURSE PRACTITIONER

## 2024-06-11 PROCEDURE — 1101F PT FALLS ASSESS-DOCD LE1/YR: CPT | Mod: CPTII,,, | Performed by: NURSE PRACTITIONER

## 2024-06-11 PROCEDURE — 3075F SYST BP GE 130 - 139MM HG: CPT | Mod: CPTII,,, | Performed by: NURSE PRACTITIONER

## 2024-06-11 PROCEDURE — 3044F HG A1C LEVEL LT 7.0%: CPT | Mod: CPTII,,, | Performed by: NURSE PRACTITIONER

## 2024-06-11 PROCEDURE — 11000001 HC ACUTE MED/SURG PRIVATE ROOM

## 2024-06-11 PROCEDURE — 27100171 HC OXYGEN HIGH FLOW UP TO 24 HOURS

## 2024-06-11 PROCEDURE — 94761 N-INVAS EAR/PLS OXIMETRY MLT: CPT

## 2024-06-11 PROCEDURE — 99285 EMERGENCY DEPT VISIT HI MDM: CPT | Mod: 25,27

## 2024-06-11 PROCEDURE — 83735 ASSAY OF MAGNESIUM: CPT | Performed by: EMERGENCY MEDICINE

## 2024-06-11 PROCEDURE — 63600175 PHARM REV CODE 636 W HCPCS: Performed by: EMERGENCY MEDICINE

## 2024-06-11 PROCEDURE — 3080F DIAST BP >= 90 MM HG: CPT | Mod: CPTII,,, | Performed by: NURSE PRACTITIONER

## 2024-06-11 RX ORDER — ACETAMINOPHEN 325 MG/1
650 TABLET ORAL EVERY 6 HOURS PRN
Status: DISCONTINUED | OUTPATIENT
Start: 2024-06-11 | End: 2024-06-14 | Stop reason: HOSPADM

## 2024-06-11 RX ORDER — NALOXONE HCL 0.4 MG/ML
0.02 VIAL (ML) INJECTION
Status: DISCONTINUED | OUTPATIENT
Start: 2024-06-11 | End: 2024-06-14 | Stop reason: HOSPADM

## 2024-06-11 RX ORDER — GLUCAGON 1 MG
1 KIT INJECTION
Status: DISCONTINUED | OUTPATIENT
Start: 2024-06-11 | End: 2024-06-14 | Stop reason: HOSPADM

## 2024-06-11 RX ORDER — IBUPROFEN 200 MG
16 TABLET ORAL
Status: DISCONTINUED | OUTPATIENT
Start: 2024-06-11 | End: 2024-06-14 | Stop reason: HOSPADM

## 2024-06-11 RX ORDER — DAPAGLIFLOZIN 5 MG/1
5 TABLET, FILM COATED ORAL DAILY
Status: ON HOLD | COMMUNITY
End: 2024-06-14 | Stop reason: HOSPADM

## 2024-06-11 RX ORDER — METOPROLOL SUCCINATE 50 MG/1
100 TABLET, EXTENDED RELEASE ORAL DAILY
Status: DISCONTINUED | OUTPATIENT
Start: 2024-06-12 | End: 2024-06-14 | Stop reason: HOSPADM

## 2024-06-11 RX ORDER — FAMOTIDINE 20 MG/1
20 TABLET, FILM COATED ORAL DAILY
Status: DISCONTINUED | OUTPATIENT
Start: 2024-06-12 | End: 2024-06-14 | Stop reason: HOSPADM

## 2024-06-11 RX ORDER — ATORVASTATIN CALCIUM 40 MG/1
80 TABLET, FILM COATED ORAL DAILY
Status: DISCONTINUED | OUTPATIENT
Start: 2024-06-12 | End: 2024-06-14 | Stop reason: HOSPADM

## 2024-06-11 RX ORDER — CETIRIZINE HYDROCHLORIDE 5 MG/1
5 TABLET ORAL DAILY
Status: DISCONTINUED | OUTPATIENT
Start: 2024-06-11 | End: 2024-06-14 | Stop reason: HOSPADM

## 2024-06-11 RX ORDER — SODIUM CHLORIDE 0.9 % (FLUSH) 0.9 %
10 SYRINGE (ML) INJECTION EVERY 12 HOURS PRN
Status: DISCONTINUED | OUTPATIENT
Start: 2024-06-11 | End: 2024-06-14 | Stop reason: HOSPADM

## 2024-06-11 RX ORDER — METOPROLOL TARTRATE 50 MG/1
50 TABLET ORAL
Status: COMPLETED | OUTPATIENT
Start: 2024-06-11 | End: 2024-06-11

## 2024-06-11 RX ORDER — IBUPROFEN 200 MG
24 TABLET ORAL
Status: DISCONTINUED | OUTPATIENT
Start: 2024-06-11 | End: 2024-06-14 | Stop reason: HOSPADM

## 2024-06-11 RX ORDER — FUROSEMIDE 10 MG/ML
80 INJECTION INTRAMUSCULAR; INTRAVENOUS
Status: COMPLETED | OUTPATIENT
Start: 2024-06-11 | End: 2024-06-11

## 2024-06-11 RX ORDER — HYDRALAZINE HYDROCHLORIDE 50 MG/1
50 TABLET, FILM COATED ORAL 2 TIMES DAILY
Status: DISCONTINUED | OUTPATIENT
Start: 2024-06-11 | End: 2024-06-14 | Stop reason: HOSPADM

## 2024-06-11 RX ORDER — IBUPROFEN 200 MG
16 TABLET ORAL
Status: DISCONTINUED | OUTPATIENT
Start: 2024-06-11 | End: 2024-06-11

## 2024-06-11 RX ORDER — LABETALOL HCL 20 MG/4 ML
10 SYRINGE (ML) INTRAVENOUS EVERY 6 HOURS PRN
Status: DISCONTINUED | OUTPATIENT
Start: 2024-06-11 | End: 2024-06-14 | Stop reason: HOSPADM

## 2024-06-11 RX ORDER — IBUPROFEN 200 MG
24 TABLET ORAL
Status: DISCONTINUED | OUTPATIENT
Start: 2024-06-11 | End: 2024-06-11

## 2024-06-11 RX ORDER — FAMOTIDINE 20 MG/1
20 TABLET, FILM COATED ORAL 2 TIMES DAILY
COMMUNITY

## 2024-06-11 RX ORDER — GLUCAGON 1 MG
1 KIT INJECTION
Status: DISCONTINUED | OUTPATIENT
Start: 2024-06-11 | End: 2024-06-11

## 2024-06-11 RX ORDER — INSULIN ASPART 100 [IU]/ML
0-5 INJECTION, SOLUTION INTRAVENOUS; SUBCUTANEOUS
Status: DISCONTINUED | OUTPATIENT
Start: 2024-06-11 | End: 2024-06-14 | Stop reason: HOSPADM

## 2024-06-11 RX ADMIN — SACUBITRIL AND VALSARTAN 2 TABLET: 24; 26 TABLET, FILM COATED ORAL at 08:06

## 2024-06-11 RX ADMIN — CETIRIZINE HYDROCHLORIDE 5 MG: 5 TABLET ORAL at 08:06

## 2024-06-11 RX ADMIN — FUROSEMIDE 80 MG: 10 INJECTION, SOLUTION INTRAMUSCULAR; INTRAVENOUS at 02:06

## 2024-06-11 RX ADMIN — METOPROLOL TARTRATE 50 MG: 50 TABLET, FILM COATED ORAL at 02:06

## 2024-06-11 RX ADMIN — HYDRALAZINE HYDROCHLORIDE 50 MG: 50 TABLET ORAL at 08:06

## 2024-06-11 NOTE — PROGRESS NOTES
CHIEF COMPLAINT:   Chief Complaint   Patient presents with    Follow-up     Patient c/o having SOB and states BP has been high since out of hospital last week                   Review of patient's allergies indicates:  No Known Allergies                                       HPI:  Jamey Duke 63 y.o. male with  NICMO/HFrEF (EF-40% per ECHO 4.26.24 ), h/o persistent AF/AFL (s/p multiple attempted DCCVs but has had recurrence of AFL), Carotid artery stenosis, HTN, HLD, DM II, SHADI/CPAP, and tobacco use (cigars)  who presents to cardiology clinic for routine follow up and ongoing care.  He is a former patient of Dr. Gutierrez and Dr. Syed.  Patient was noted to have hospitalization in April 2024 with decompensated systolic heart failure, AFib RVR, and LUIS EDUARDO on CKD. BNP-2168, trop- neg.  Echocardiogram obtained during that admission on 4.26.24 revealed an EF of 40%, indeterminate diastolic function, and no significant valvular abnormalities, which is stable from previous EF of 40% per ECHO Jan. 2023.  The patient responded well to IV diuresis and mediations were adjusted.  He was also noted to have another recent brief admission at [facility] with continued symptoms of progressive shortness of breath on exertion.  The patient reports that he was discharged after a day with no improvement in symptoms.      He presents to clinic today endorsing worsening dyspnea on exertional with minimal activity.  The patient is unable to ambulate approximately 10 ft to the bathroom without feeling short of breath.  He also endorses orthopnea, PND, abdominal distention, bilateral lower extremity edema, and atypical right-sided chest pain.  Notably, the patient had a recent ischemic evaluation of a Left Heart Catheterization on 7.29.22 that revealed normal coronary arteries.  He also underwent a LHC in 2017 that revealed normal coronary arteries.    Patient endorses total compliance with his medications and diet however he states that his  symptoms have persisted without any improvement. He also notes that he is not urinating well at home.  He also notes a recent 5 lb weight gain.  Patient also is currently requiring a wheelchair because he is unable to ambulate due to his increased symptoms.  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     PMH: HTN, HLD, h/o persistent AF/AFL, and h/o NICMP  PSH: ablation  Social History: Denies any illicit drug, tobacco use-smokes occasional cigar . Reports occasional ETOH use  Family History: Father -leaky valve    CARDIAC TESTING:    ECHO 4.26.24       Technically difficult study. Optison contrast used.    Atrial fibrillation observed.    Left Ventricle: The left ventricle is normal in size. Moderate global hypokinesis present. There is moderately reduced systolic function. Biplane (2D) method of discs ejection fraction is 40%. There is indeterminate diastolic function.    Right Ventricle: Right ventricle was not well visualized due to poor acoustic window.    Left Atrium: Left atrium is moderately dilated. The left atrium volume index MOD is 42.6 mL/m2.    Aortic Valve: The aortic valve is a trileaflet valve. There is no stenosis. There is no significant regurgitation.    Mitral Valve: There is no stenosis. There is no significant regurgitation.    Tricuspid Valve: There is no significant regurgitation.    Pulmonic Valve: There is no significant regurgitation.    Aorta: Aortic root is normal in size measuring 3.4 cm.    IVC/SVC: Intermediate venous pressure at 8 mmHg.       ECHO 1.30.23  The left ventricle is normal in size with mildly decreased systolic function.  Normal right ventricular size with normal right ventricular systolic function.  The estimated ejection fraction is 40%.  Moderate right atrial enlargement.  Mild mitral regurgitation.  Atrial fibrillation observed.  There is mild to moderate pulmonary  hypertension.  Moderate left atrial enlargement.    Cardiac catheterization 7.29.22    Conclusion  · The pre-procedure left ventricular end diastolic pressure was 22.  · The estimated blood loss was none.  · The coronary arteries were normal..    FINDINGS  The patient has No CAD  Blood loss:  less than 15 cc.    RECOMMENDATIONS  Medical management -- maximize GDMT  Risk factor modifications -- smoking cessation  Activity -- avoid straining with affected limb for one week  Exercise on regular basis.    Lexiscan stress test 6.20.22       Abnormal myocardial perfusion scan.    There are two significant perfusion abnormalities.    Perfusion Abnormality #1 - There is a moderate to severe intensity, moderate to large sized, reversible perfusion abnormality that is consistent with ischemia in the basal to apical lateral apical wall(s) in the typical distribution of the LCX territory.    Perfusion Abnormality #2 - There is a small to moderate sized, moderate intensity, reversible perfusion abnormality that is consistent with ischemia in the basal to mid anterior wall(s) in the typical distribution of the LAD territory.    There are no other significant perfusion abnormalities.    The gated perfusion images showed an ejection fraction of 36% at rest. The gated perfusion images showed an ejection fraction of 33% post stress.    The EKG portion of this study is abnormal but not diagnostic.    The patient reported no chest pain during the stress test.    There were no arrhythmias during stress.     If clinically indicated, consider further evaluation with coronary angiogram.       ECHO 5.27.22     The left ventricle is normal in size with moderately decreased systolic function.  The estimated ejection fraction is 35%.  Atrial fibrillation observed.  Mild mitral regurgitation.  Mild tricuspid regurgitation.  IVC is dilated.IVC collapses >50%.  Intermediate central venous pressure (8 mmHg).  The estimated PA systolic pressure is  37 mmHg.  Normal right ventricular size with normal right ventricular systolic function.  There is no pulmonary hypertension.       ECHO 1.24.20    1. The study quality is below average.  2. The left ventricle is normal in size. Global left ventricular systolic function is mildly decreased. The left ventricular ejection fraction is 45%.  3. The left atrial diameter is mildly increased. The right atrium is mildly enlarged.  4. Mild (1+) mitral regurgitation. Mild (1+) tricuspid regurgitation.  5. The pulmonary artery systolic pressure is 35 mmHg.     PET 12.6.18   This is a normal perfusion study, no perfusion defects noted.   This scan is suggestive of low risk for future cardiovascular events.   The left ventricular cavity is noted to be moderately enlarged on the stress studies. The stress left ventricular ejection fraction was calculated to be 45% and left ventricular global function is mildly reduced. The rest left ventricular cavity is noted to be mildly enlarged. The rest left ventricular ejection fraction was calculated to be 39% and rest left ventricular global function is moderately reduced.   When compared to the resting ejection fraction (39%), the stress ejection fraction (45%) has increased.   The study quality is good.    Carotid US 2.9.18  1. The study quality is below average.  2. 1-39% stenosis in the proximal right internal carotid artery based on Bluth Criteria.  3. Antegrade right vertebral artery flow.  4. Antegrade left vertebral artery flow    [facility] 1.3.17  EF -20%  Normal coronaries                                                                                                                                                                                                                                                                                                      Patient Active Problem List   Diagnosis    Atrial flutter    Hyperlipidemia    Hypertension    Type 2 diabetes mellitus with  kidney complication, without long-term current use of insulin    Tobacco user    Subclinical hyperthyroidism    Other male erectile dysfunction    Acute systolic heart failure    AMD (age-related macular degeneration), bilateral    Stage 3a chronic kidney disease    Persistent proteinuria    Chronic pain of both knees    SHADI on CPAP    Class 3 severe obesity due to excess calories with serious comorbidity and body mass index (BMI) of 45.0 to 49.9 in adult    Non-seasonal allergic rhinitis due to pollen    Acute exacerbation of CHF (congestive heart failure)    Atrial fibrillation with RVR    LUIS EDUARDO (acute kidney injury)     Past Surgical History:   Procedure Laterality Date    ANGIOGRAM, CORONARY, WITH LEFT HEART CATHETERIZATION N/A 07/29/2022    Procedure: Angiogram, Coronary, with Left Heart Cath;  Surgeon: Yossi Zuniga MD;  Location: [facility] CATH LAB;  Service: Cardiology;  Laterality: N/A;    Cardioversion x 2      COLONOSCOPY  10/24/2023    Comprehensive electrophysiologic eval. including insertion & repositioning of multiple electrode catheters with induction or attempted induction of arrhythmias with left atrial pacing & recordingfrom coronary sinus or left atrium  07/10/2018    comprehensive electrophysiologic eval. insertion & repositioning multiple electrode catheters with induction or attempted induction of an arrhythmia with right atrial pacing and recording, right ventricular pacing and recording  07/10/2018    Destruction of Conduction Mechanism, Percutaneous Approach  07/10/2018     Social History     Socioeconomic History    Marital status: Single   Occupational History    Occupation: retired   Tobacco Use    Smoking status: Some Days     Types: Cigars     Passive exposure: Current    Smokeless tobacco: Never    Tobacco comments:     Occasionally, last smoked 4/20/24   Substance and Sexual Activity    Alcohol use: Yes     Alcohol/week: 6.0 standard drinks of alcohol     Types: 6 Cans of beer per week      Comment: Drinks occasionally beer on weekends    Drug use: Never    Sexual activity: Yes     Partners: Female     Social Determinants of Health     Financial Resource Strain: Medium Risk (10/21/2023)    Overall Financial Resource Strain (CARDIA)     Difficulty of Paying Living Expenses: Somewhat hard   Transportation Needs: Unmet Transportation Needs (9/19/2022)    PRAPARE - Transportation     Lack of Transportation (Medical): Yes     Lack of Transportation (Non-Medical): No   Physical Activity: Insufficiently Active (10/21/2023)    Exercise Vital Sign     Days of Exercise per Week: 3 days     Minutes of Exercise per Session: 30 min   Stress: Stress Concern Present (10/21/2023)    [employer] - Occupational Stress Questionnaire     Feeling of Stress : To some extent   Housing Stability: Low Risk  (9/19/2022)    Housing Stability Vital Sign     Unable to Pay for Housing in the Last Year: No     Number of Places Lived in the Last Year: 1     Unstable Housing in the Last Year: No        Family History   Problem Relation Name Age of Onset    Hypertension Mother      Valvular heart disease Father           Current Outpatient Medications:     blood sugar diagnostic Strp, Inject 1 strip into the skin 2 (two) times a day., Disp: 100 strip, Rfl: 11    blood-glucose meter kit,  True Metrix Glucometer, See Instructions, Use to check blood glucose daily, # 1 EA, 0 Refill(s), Pharmacy: [facility], 177.8, cm, Height/Length Dosing, 01/12/22 9:55:00 CST, 148.4, kg, Weight Dosing, 01/12/22 9:55:00 CST, Disp: , Rfl:     cetirizine (ZYRTEC) 10 MG tablet, Take 1 tablet (10 mg total) by mouth nightly as needed for Allergies or Rhinitis., Disp: 30 tablet, Rfl: 3    dapagliflozin propanediol (FARXIGA) 5 mg Tab tablet, Take 5 mg by mouth once daily., Disp: , Rfl:     EASY TOUCH TWIST LANCETS 33 gauge Misc, USE AS DIRECTED TO CHECK BLOOD GLUCOSE DAILY, Disp: , Rfl:     empagliflozin  (JARDIANCE) 10 mg tablet, Take 1 tablet (10 mg total) by mouth once daily., Disp: 30 tablet, Rfl: 11    famotidine (PEPCID) 20 MG tablet, Take 20 mg by mouth 2 (two) times daily., Disp: , Rfl:     fluticasone propionate (FLONASE) 50 mcg/actuation nasal spray, 1 spray (50 mcg total) by Each Nostril route once daily., Disp: 16 g, Rfl: 2    furosemide (LASIX) 40 MG tablet, Take 1 tablet (40 mg total) by mouth 2 (two) times daily., Disp: 180 tablet, Rfl: 3    hydrALAZINE (APRESOLINE) 50 MG tablet, Take 1 tablet (50 mg total) by mouth 2 (two) times daily., Disp: 180 tablet, Rfl: 3    LANCETS MISC,  True Metrix Lancets, See Instructions, Use to check blood glucose daily, # 100 EA, 11 Refill(s), Pharmacy: [facility], 177.8, cm, Height/Length Dosing, 01/12/22 9:55:00 CST, 148.4, kg, Weight Dosing, 01/12/22 9:55:00 CST, Disp: , Rfl:     metoprolol succinate (TOPROL-XL) 100 MG 24 hr tablet, Take 1 tablet (100 mg total) by mouth once daily., Disp: 90 tablet, Rfl: 3    potassium chloride SA (KLOR-CON M20) 20 MEQ tablet, Take 1 tablet (20 mEq total) by mouth 2 (two) times daily., Disp: 90 tablet, Rfl: 3    pravastatin (PRAVACHOL) 40 MG tablet, Take 1 tablet (40 mg total) by mouth every evening., Disp: 90 tablet, Rfl: 3    sacubitriL-valsartan (ENTRESTO) 49-51 mg per tablet, Take 1 tablet by mouth 2 (two) times daily., Disp: 180 tablet, Rfl: 3    testosterone cypionate (DEPOTESTOTERONE CYPIONATE) 200 mg/mL injection, Inject 200 mg into the muscle every 14 (fourteen) days., Disp: , Rfl:     XARELTO 20 mg Tab, Take 1 tablet (20 mg total) by mouth once daily., Disp: 90 tablet, Rfl: 3    tadalafiL (CIALIS) 20 MG Tab, Take 20 mg by mouth daily as needed. (Patient not taking: Reported on 6/11/2024), Disp: , Rfl:      ROS:                                                                                                                                                                             Review of Systems  "  Constitutional: Negative.    HENT: Negative.     Eyes: Negative.    Respiratory:  Positive for shortness of breath.    Cardiovascular: Negative.    Gastrointestinal: Negative.    Genitourinary: Negative.    Musculoskeletal: Negative.    Skin: Negative.    Neurological: Negative.    Endo/Heme/Allergies: Negative.    Psychiatric/Behavioral: Negative.          Blood pressure (!) 131/100, pulse 88, temperature 98.1 °F (36.7 °C), temperature source Oral, resp. rate 20, height 5' 10" (1.778 m), weight (!) 139.3 kg (307 lb), SpO2 98%.   PE:  Physical Exam  Constitutional:       Appearance: Normal appearance.   HENT:      Head: Normocephalic.   Eyes:      Pupils: Pupils are equal, round, and reactive to light.   Cardiovascular:      Rate and Rhythm: Normal rate and regular rhythm.      Pulses: Normal pulses.   Pulmonary:      Effort: Pulmonary effort is normal.   Musculoskeletal:         General: Normal range of motion.   Skin:     General: Skin is warm and dry.   Neurological:      General: No focal deficit present.      Mental Status: He is alert and oriented to person, place, and time.   Psychiatric:         Mood and Affect: Mood normal.         Behavior: Behavior normal.                ASSESSMENT/PLAN:  NICMP/HFrEF NYHA Class III- IV  - EF- 40%, indeterminate diastolic function per ECHO 4.26.24  EF - 40% per ECHO 1.30.23 improved from -->35% per ECHO 5.27.22 --> 45% per ECHO (1.24.20)  - [facility]- normal coronaries (7.29.22)  - Lexiscan  stress test - to reversible abnormalities ischemic defects (6.20.22)  - [facility]- EF-20%, Normal coronaries (1.3.17)  - Reports  increased exertional dyspnea with minimal exertion, orthopnea, PND, abdominal distention   - 1-2+ Edema noted on physical exam.  Conversational dyspnea noted  - Continue GDMT: Toprol succinate 100 mg daily, Entresto- 49/51 mg BID and Jardiance 10 mg for now, may require further titration of beta-blocker for rate control.  - Continue Lasix 40 mg BID.  The patient is " not diuresing well at home, will likely require IV diuresis for any improvement in symptoms.   - Counseled patient on low-sodium diet and s/s of volume overload  - Sending patient to ER for evaluation of acute symptoms of acute on chronic systolic heart failure- decompensated    H/o persistent AF/AFL Status post RFA on (7.10.18)   - Has multiple attempted DCCVs but has had recurrence of AFL  - Continue Toprol succinate 100 mg qd and Xarelto 20 mg p.o. daily. Denies any adverse bleeding issues    - EKG today - AFIB RVR- HR -118    HTN  - BP above goal   - Continue Entresto 46/51 mg BID, metoprolol succinate 100 mg qd, lasix 40 mg BID, hydralazine 50 mg BID  - Counseled on low-sodium diet and exercise as tolerated      Diabetes Mellitus Type II   - A1C- 5.5  - Continue management per PCP     HLD  - LDL -100, above goal <70/DM   - Continue Pravastatin 40 mg po daily.  - Counseled patient on low-cholesterol, low-fat diet, and encourage exercise as tolerated    Carotid Artery disease- Asymptomatic   - 1-39% stenosis in the proximal right internal carotid artery based on Bluth Criteria. (2018)    Tobacco use  - Reports occasional cigar use.  - Counseled on importance of smoking cessation    SHADI/CPAP  - Reports nightly compliance with CPAP      EKG  Send to ED for evaluation of acute on chronic systolic heart failure- decompensated and AFib with RVR  Follow-up in cardiology clinic post hospitalization  Follow up with PCP as directed

## 2024-06-11 NOTE — ED PROVIDER NOTES
Encounter Date: 6/11/2024       History     Chief Complaint   Patient presents with    Weakness    Shortness of Breath     C/o weakness, sob. Sent from cardiology clinic for a fib     Patient presents from cardiology clinic upstairs for concerns of AFib RVR.  Patient states that he was hospitalized a proximally 2 weeks ago for his AFib.  States since that time he gets short of breath walking across the room and sometimes has chest tightness.  He was there today for a routine workup when he was found to be in AFib RVR and sent to this emergency department.  He states he has been taking his medications as prescribed.  In the emergency department he denies any chest pain at this time.  He states he has a proximally 7 lb of fluid gain since he was discharged from the hospital.      Review of patient's allergies indicates:  No Known Allergies  Past Medical History:   Diagnosis Date    Atrial flutter     Diabetes mellitus     Hypercholesteremia     Hypertension     Morbid obesity     Obese     Tobacco user      Past Surgical History:   Procedure Laterality Date    ANGIOGRAM, CORONARY, WITH LEFT HEART CATHETERIZATION N/A 07/29/2022    Procedure: Angiogram, Coronary, with Left Heart Cath;  Surgeon: Yossi Zuniga MD;  Location: [facility] CATH LAB;  Service: Cardiology;  Laterality: N/A;    Cardioversion x 2      COLONOSCOPY  10/24/2023    Comprehensive electrophysiologic eval. including insertion & repositioning of multiple electrode catheters with induction or attempted induction of arrhythmias with left atrial pacing & recordingfrom coronary sinus or left atrium  07/10/2018    comprehensive electrophysiologic eval. insertion & repositioning multiple electrode catheters with induction or attempted induction of an arrhythmia with right atrial pacing and recording, right ventricular pacing and recording  07/10/2018    Destruction of Conduction Mechanism, Percutaneous Approach  07/10/2018     Family History   Problem Relation  Name Age of Onset    Hypertension Mother      Valvular heart disease Father       Social History     Tobacco Use    Smoking status: Some Days     Types: Cigars     Passive exposure: Current    Smokeless tobacco: Never    Tobacco comments:     Occasionally, last smoked 4/20/24   Substance Use Topics    Alcohol use: Yes     Alcohol/week: 6.0 standard drinks of alcohol     Types: 6 Cans of beer per week     Comment: Drinks occasionally beer on weekends    Drug use: Never     Review of Systems   Constitutional:  Positive for unexpected weight change.   Respiratory:  Positive for chest tightness and shortness of breath.    All other systems reviewed and are negative.      Physical Exam     Initial Vitals [06/11/24 1001]   BP Pulse Resp Temp SpO2   (!) 154/55 109 (!) 24 97.7 °F (36.5 °C) 96 %      MAP       --         Physical Exam    Nursing note and vitals reviewed.  Constitutional: He appears well-developed and well-nourished.   HENT:   Head: Normocephalic and atraumatic.   Eyes: EOM are normal. Pupils are equal, round, and reactive to light.   Neck:   Normal range of motion.  Cardiovascular:            No murmur heard.  Irregularly irregular rhythm   Pulmonary/Chest: Breath sounds normal. No respiratory distress. He has no wheezes. He has no rales.   Abdominal: Abdomen is soft. Bowel sounds are normal. He exhibits no distension. There is no abdominal tenderness. There is no rebound.   Musculoskeletal:         General: Normal range of motion.      Cervical back: Normal range of motion.      Comments: Trace pitting edema bilaterally lower extremities     Neurological: He is alert and oriented to person, place, and time. No cranial nerve deficit.   Psychiatric: He has a normal mood and affect.         ED Course   Critical Care    Date/Time: 6/11/2024 2:05 PM    Performed by: Jarrett Nair MD  Authorized by: Jarrett Nair MD  Total critical care time (exclusive of procedural time) : 0 minutes  Critical care was time  spent personally by me on the following activities: discussions with consultants, examination of patient, obtaining history from patient or surrogate, pulse oximetry, re-evaluation of patient's condition and review of old charts.  Comments: IV Lasix        Labs Reviewed   COMPREHENSIVE METABOLIC PANEL - Abnormal; Notable for the following components:       Result Value    Chloride 109 (*)     CO2 20 (*)     Blood Urea Nitrogen 32.6 (*)     Creatinine 2.08 (*)     Protein Total 7.7 (*)     Globulin 4.3 (*)     Albumin/Globulin Ratio 0.8 (*)     AST 39 (*)     All other components within normal limits   B-TYPE NATRIURETIC PEPTIDE - Abnormal; Notable for the following components:    Natriuretic Peptide 2,646.1 (*)     All other components within normal limits   CBC WITH DIFFERENTIAL - Abnormal; Notable for the following components:    WBC 12.47 (*)     RBC 4.47 (*)     Hgb 13.7 (*)     Hct 41.5 (*)     RDW 17.7 (*)     MPV 12.2 (*)     IG# 0.05 (*)     All other components within normal limits   MAGNESIUM - Normal   TROPONIN I - Normal   CBC W/ AUTO DIFFERENTIAL    Narrative:     The following orders were created for panel order CBC auto differential.  Procedure                               Abnormality         Status                     ---------                               -----------         ------                     CBC with Differential[[id number]]       Abnormal            Final result                 Please view results for these tests on the individual orders.   EXTRA TUBES    Narrative:     The following orders were created for panel order EXTRA TUBES.  Procedure                               Abnormality         Status                     ---------                               -----------         ------                     Light Blue Top Hold[[id number]]                             Final result               Red Top Hold[[id number]]                                    Final result                 Please  view results for these tests on the individual orders.   LIGHT BLUE TOP HOLD   RED TOP HOLD   EXTRA TUBES    Narrative:     The following orders were created for panel order EXTRA TUBES.  Procedure                               Abnormality         Status                     ---------                               -----------         ------                     Gold Top Hold[[id number]]                                   Final result                 Please view results for these tests on the individual orders.   GOLD TOP HOLD     EKG Readings: (Independently Interpreted)   Time 9:59 a.m.  Rate 114, atrial fibrillation with rapid ventricular response, normal axis, nonspecific T-wave abnormalities with no concerning ST depressions or elevations, no STEMI     ECG Results              EKG 12-lead (Weakness) Age > 50 (Final result)        Collection Time Result Time QRS Duration OHS QTC Calculation    06/11/24 09:59:17 06/11/24 17:40:06 84 405                     Final result by Interface, Lab In [facility] (06/11/24 17:40:11)                   Narrative:    Test Reason : R53.1,    Vent. Rate : 114 BPM     Atrial Rate : 102 BPM     P-R Int : 000 ms          QRS Dur : 084 ms      QT Int : 294 ms       P-R-T Axes : 000 029 181 degrees     QTc Int : 405 ms    Atrial fibrillation with rapid ventricular response  Nonspecific T wave abnormality  Abnormal ECG  When compared with ECG of 11-JUN-2024 09:04,  No significant change was found  Confirmed by Rose Molina MD ([id number]) on 6/11/2024 5:40:02 PM    Referred By: [email or web address]   SELF           Confirmed By:Rose Molina MD                                  Imaging Results              CT Chest Without Contrast (Final result)  Result time 06/11/24 17:26:31      Final result by Geeta Campos MD (06/11/24 17:26:31)                   Impression:      1. No significant interval change compared to 04/24/2024.  2. Mediastinal lymphadenopathy and loculated bilateral pleural  effusions      Electronically signed by: Geeta Campos  Date:    06/11/2024  Time:    17:26               Narrative:    EXAMINATION:  CT CHEST WITHOUT CONTRAST    CLINICAL HISTORY:  Pleural effusion, malignancy suspected;    TECHNIQUE:  CT imaging of the chest without IV contrast. Axial, coronal and sagittal reformatted images are reviewed. Dose length product is 528 mGycm. Automatic exposure control, adjustment of mA/kV or iterative reconstruction technique was used to limit radiation dose.    COMPARISON:  CT chest dated 04/24/2024    FINDINGS:  Lungs and large airways: No focal pulmonary nodule.  Mild septal thickening in the lung bases.    Pleura: Similar small bilateral loculated pleural effusions, right greater than left.    Heart and vasculature: The heart is stable in size.  There is no pericardial effusion.  There are scattered coronary calcifications.    Mediastinum and ashley: Similar enlarged mediastinal lymph nodes.  For example a right paratracheal lymph node measures 1.8 cm in short axis (series 6, image 31).    Chest wall/axilla and lower neck: No significant findings.    Upper abdomen: No significant findings.    Bones: No acute osseous findings.                                       X-Ray Chest 1 View (Final result)  Result time 06/11/24 12:55:40      Final result by Davis Cat MD (06/11/24 12:55:40)                   Impression:      Mild cardiomegaly with generalized pulmonary vascular prominence.      Electronically signed by: Davis Cat  Date:    06/11/2024  Time:    12:55               Narrative:    EXAMINATION:  XR CHEST 1 VIEW    CLINICAL HISTORY:  afib;    TECHNIQUE:  Frontal view(s) of the chest.    COMPARISON:  Radiography 04/30/2024    FINDINGS:  No significant change in left lower lung pleuroparenchymal opacity since April.  An area of fluid along the right minor fissure also has similar appearance.  Generalized pulmonary vascular prominence with no defined acute  consolidation.  No pneumothorax.  Mildly enlarged cardiac silhouette.                                       Medications   sodium chloride 0.9% flush 10 mL (has no administration in time range)   naloxone 0.4 mg/mL injection 0.02 mg (has no administration in time range)   dextrose 10% bolus 125 mL 125 mL (has no administration in time range)   dextrose 10% bolus 250 mL 250 mL (has no administration in time range)   empagliflozin (Jardiance) tablet 10 mg (10 mg Oral Given 6/12/24 0823)   famotidine tablet 20 mg (20 mg Oral Given 6/12/24 0823)   hydrALAZINE tablet 50 mg (50 mg Oral Given 6/12/24 0824)   metoprolol succinate (TOPROL-XL) 24 hr tablet 100 mg (100 mg Oral Given 6/12/24 0823)   atorvastatin tablet 80 mg (80 mg Oral Given 6/12/24 0823)   sacubitriL-valsartan 24-26 mg per tablet 2 tablet (2 tablets Oral Given 6/12/24 0823)   rivaroxaban tablet 20 mg (20 mg Oral Given 6/12/24 0823)   glucose chewable tablet 16 g (has no administration in time range)   glucose chewable tablet 24 g (has no administration in time range)   glucagon (human recombinant) injection 1 mg (has no administration in time range)   insulin aspart U-100 injection 0-5 Units (has no administration in time range)   acetaminophen tablet 650 mg (has no administration in time range)   labetalol 20 mg/4 mL (5 mg/mL) IV syring (has no administration in time range)   cetirizine tablet 5 mg (5 mg Oral Given 6/12/24 0823)   furosemide injection 40 mg (40 mg Intravenous Given 6/12/24 1054)   metoprolol tartrate (LOPRESSOR) tablet 50 mg (has no administration in time range)   metoprolol tartrate (LOPRESSOR) tablet 50 mg (50 mg Oral Given 6/11/24 1416)   furosemide injection 80 mg (80 mg Intravenous Given 6/11/24 1416)     Medical Decision Making  Amount and/or Complexity of Data Reviewed  Labs: ordered.  Radiology: ordered.    Risk  Prescription drug management.  Decision regarding hospitalization.                          Medical Decision Making:    Initial Assessment:   Workup in progress.  Patient fluctuating between 98 and 110-120 during history and physical.  In no distress at this time.  The patient is on Xarelto and medically noncompliant do not feel PE workup warranted at this time as he is already on anticoagulation.  Is shortness of breath is when he walks with a history of CHF.  Differential Diagnosis:   AFib RVR, medication noncompliance, electrolyte derangement, PE, pneumonia, bronchitis, fluid overload  ED Management:  Patient's labs within normal limits or trending with in his baseline.  Patient has been fluctuating between the mid 90s and 120.  Due to patient's shortness of breath with exertion as well as continued inadequately blood pressure control will admit for inpatient titration and adjustments of medications as patient was sent here by Cardiology for this.  As patient already had 7 lb unintentional weight gain in his on 40 mg of oral Lasix twice daily will provide 1 time dose of 80 mg of IV Lasix in the emergency department as well as 50 mg of metoprolol tartrate as he has already on a 100 mg daily XL. Discussed case with an internal medicine who will accept the patient             Clinical Impression:  Final diagnoses:  [R53.1] Weakness  [I48.91] A-fib  [I15.9] Secondary hypertension (Primary)  [R06.00] Dyspnea, unspecified type          ED Disposition Condition    Admit Stable                Jarrett Nair MD  06/12/24 [id number]

## 2024-06-11 NOTE — H&P
[facility] Wards History & Physical Note     Resident Team: [facility] List 3  Attending Physician: Demetrius Arrington MD  Resident: Vlad Mccormick      Date of Admit: 6/11/2024    Chief Complaint     Weakness and Shortness of Breath (C/o weakness, sob. Sent from cardiology clinic for a fib)       Subjective:      History of Present Illness:  Jamey Duke is a 63 y.o.  male who with a history of  PMH of CHF (EF 40% in 1/2023), CKD, Afib on Xarelto with failed DCCV and ablation, and SHADI on CPAP, left-sided loculated pleural effusion . The patient presented to [facility] ED   on 6/11/2024  with a primary complaint of Weakness and Shortness of Breath (C/o weakness, sob. Sent from cardiology clinic for a fib)    Patient reports for the past month he was getting more more short of breath.  He reports he was unable to walk more than 10-15 feet.  Patient was reports after his previous admission for heart failure exacerbation he was discharged with home health.  He was noticed he is gained about 7 lb in the last 2 weeks.  He was attempted to call home health in an attempt to adjust his medication but was unable to reach them.  Patient also endorses he drinks up to 8 bottles of 8 fluid oz of water a day.  This morning he was seen in cardiology clinic for routine follow up.  Due to severity of symptoms and debilitation with shortness of breath patient was asked to go to the emergency department and be evaluated.     Per chart review it was noted in the cardiology note from earlier this morning the patient was complaining of atypical chest pain and orthopnea.  This was not endorsed by the patient on my interview.     Patient has a extensive cardiology workup previously.  He was most recent echo was completed on 04/26/2024 which showed an ejection fraction of 40%.  It was indeterminate patient was diastolic dysfunction due to poor acoustic windows.  There was moderate global hypokinesis present.  Patient was also had a cardiac  catheterization 07/29/2022 that revealed normal coronary arteries.      In the emergency department laboratory workup was notable for a white count of 12.47, bicarb 20, BUN of 32.6 and a creatinine of 2.08.  BNP was markedly elevated in the setting of CKD at 2646.  Chest x-ray notable for worsening of loculated pleural effusion on left side.  Cardiomegaly also noted on chest x-ray.  EKGs unremarkable.    Patient was denies any recent syncopal episodes, palpitations, or chest pain.        Past Medical History:  Past Medical History:   Diagnosis Date    Atrial flutter     Diabetes mellitus     Hypercholesteremia     Hypertension     Morbid obesity     Obese     Tobacco user        Past Surgical History:  Past Surgical History:   Procedure Laterality Date    ANGIOGRAM, CORONARY, WITH LEFT HEART CATHETERIZATION N/A 07/29/2022    Procedure: Angiogram, Coronary, with Left Heart Cath;  Surgeon: Yossi Zuniga MD;  Location: [facility] CATH LAB;  Service: Cardiology;  Laterality: N/A;    Cardioversion x 2      COLONOSCOPY  10/24/2023    Comprehensive electrophysiologic eval. including insertion & repositioning of multiple electrode catheters with induction or attempted induction of arrhythmias with left atrial pacing & recordingfrom coronary sinus or left atrium  07/10/2018    comprehensive electrophysiologic eval. insertion & repositioning multiple electrode catheters with induction or attempted induction of an arrhythmia with right atrial pacing and recording, right ventricular pacing and recording  07/10/2018    Destruction of Conduction Mechanism, Percutaneous Approach  07/10/2018       Family History:  Family History   Problem Relation Name Age of Onset    Hypertension Mother      Valvular heart disease Father         Social History:  Social History     Tobacco Use    Smoking status: Some Days     Types: Cigars     Passive exposure: Current    Smokeless tobacco: Never    Tobacco comments:     Occasionally, last smoked  4/20/24   Substance Use Topics    Alcohol use: Yes     Alcohol/week: 6.0 standard drinks of alcohol     Types: 6 Cans of beer per week     Comment: Drinks occasionally beer on weekends    Drug use: Never       Allergies:  Review of patient's allergies indicates:  No Known Allergies    Home Medications:  Prior to Admission medications    Medication Sig Start Date End Date Taking? Authorizing Provider   blood sugar diagnostic Strp Inject 1 strip into the skin 2 (two) times a day. 5/12/22   Geetha Pickens FNP   blood-glucose meter kit   True Metrix Glucometer, See Instructions, Use to check blood glucose daily, # 1 EA, 0 Refill(s), Pharmacy: [facility], 177.8, cm, Height/Length Dosing, 01/12/22 9:55:00 CST, 148.4, kg, Weight Dosing, 01/12/22 9:55:00 CST 1/14/22   Provider, Historical   cetirizine (ZYRTEC) 10 MG tablet Take 1 tablet (10 mg total) by mouth nightly as needed for Allergies or Rhinitis. 1/26/24   Geetha Pickens FNP   dapagliflozin propanediol (FARXIGA) 5 mg Tab tablet Take 5 mg by mouth once daily.    Provider, Historical   EASY TOUCH TWIST LANCETS 33 gauge Misc USE AS DIRECTED TO CHECK BLOOD GLUCOSE DAILY 1/14/22   Provider, Historical   empagliflozin (JARDIANCE) 10 mg tablet Take 1 tablet (10 mg total) by mouth once daily. 9/15/23 9/14/24  DevanChristin apple L, FNP   famotidine (PEPCID) 20 MG tablet Take 20 mg by mouth 2 (two) times daily.    Provider, Historical   fluticasone propionate (FLONASE) 50 mcg/actuation nasal spray 1 spray (50 mcg total) by Each Nostril route once daily. 1/26/24   Geetha Pickens FNP   furosemide (LASIX) 40 MG tablet Take 1 tablet (40 mg total) by mouth 2 (two) times daily. 4/2/24 4/2/25  DevanSantinoica L FNP   hydrALAZINE (APRESOLINE) 50 MG tablet Take 1 tablet (50 mg total) by mouth 2 (two) times daily. 4/2/24 4/2/25  Devan Laterica L FNP   LANCETS MISC   True Metrix Lancets, See Instructions, Use to check blood glucose daily, # 100 EA, 11  "Refill(s), Pharmacy: [facility], 177.8, cm, Height/Length Dosing, 22 9:55:00 CST, 148.4, kg, Weight Dosing, 22 9:55:00 CST 22   Provider, Historical   metoprolol succinate (TOPROL-XL) 100 MG 24 hr tablet Take 1 tablet (100 mg total) by mouth once daily. 24  Rachel Chance,    potassium chloride SA (KLOR-CON M20) 20 MEQ tablet Take 1 tablet (20 mEq total) by mouth 2 (two) times daily. 24   Christin Hartman FNP   pravastatin (PRAVACHOL) 40 MG tablet Take 1 tablet (40 mg total) by mouth every evening. 24  Christin Hartman, FNP   sacubitriL-valsartan (ENTRESTO) 49-51 mg per tablet Take 1 tablet by mouth 2 (two) times daily. 12/11/23 12/10/24  Christin Hartman FNP   tadalafiL (CIALIS) 20 MG Tab Take 20 mg by mouth daily as needed.  Patient not taking: Reported on 2024   Provider, Historical   testosterone cypionate (DEPOTESTOTERONE CYPIONATE) 200 mg/mL injection Inject 200 mg into the muscle every 14 (fourteen) days. 5/15/24   Provider, Historical   XARELTO 20 mg Tab Take 1 tablet (20 mg total) by mouth once daily. 9/15/23 9/14/24  Christin Hartman FNP         Review of Systems:  Negative for all symptoms other than those listed in HPI           Objective:   Last 24 Hour Vital Signs:  BP  Min: 126/104  Max: 160/111  Temp  Av.9 °F (36.6 °C)  Min: 97.7 °F (36.5 °C)  Max: 98.1 °F (36.7 °C)  Pulse  Av.9  Min: 58  Max: 122  Resp  Av.6  Min: 16  Max: 30  SpO2  Av %  Min: 93 %  Max: 98 %  Height  Av' 10" (177.8 cm)  Min: 5' 10" (177.8 cm)  Max: 5' 10" (177.8 cm)  Weight  Av.3 kg (307 lb 0.8 oz)  Min: 139.3 kg (307 lb)  Max: 139.3 kg (307 lb 1.6 oz)  Body mass index is 44.06 kg/m².  No intake/output data recorded.    Physical Examination:  Physical Exam  Constitutional:       Appearance: He is obese.   HENT:      Head: Normocephalic and atraumatic.   Eyes:      Extraocular Movements: Extraocular movements " intact.      Conjunctiva/sclera: Conjunctivae normal.      Pupils: Pupils are equal, round, and reactive to light.   Neck:      Vascular: No carotid bruit.   Cardiovascular:      Rate and Rhythm: Tachycardia present. Rhythm irregular.      Heart sounds: Normal heart sounds. No murmur heard.     No friction rub. No gallop.      Comments: Distant heart sounds  Pulmonary:      Effort: No respiratory distress.      Breath sounds: No wheezing or rales.      Comments: Difficult to auscultate  Abdominal:      General: Bowel sounds are normal.      Palpations: Abdomen is soft.   Musculoskeletal:      Cervical back: No rigidity or tenderness.      Right lower leg: No edema.      Left lower leg: No edema.   Lymphadenopathy:      Cervical: No cervical adenopathy.   Skin:     Capillary Refill: Capillary refill takes less than 2 seconds.   Neurological:      General: No focal deficit present.      Mental Status: He is alert and oriented to person, place, and time.           Laboratory:  Most Recent Data:  CBC:   Lab Results   Component Value Date    WBC 12.47 (H) 06/11/2024    HGB 13.7 (L) 06/11/2024    HCT 41.5 (L) 06/11/2024     06/11/2024    MCV 92.8 06/11/2024    RDW 17.7 (H) 06/11/2024     WBC Differential:   Recent Labs   Lab 06/11/24  1042   WBC 12.47*   HGB 13.7*   HCT 41.5*      MCV 92.8     BMP:   Lab Results   Component Value Date     06/11/2024    K 4.2 06/11/2024     (H) 06/11/2024    CO2 20 (L) 06/11/2024    BUN 32.6 (H) 06/11/2024    CREATININE 2.08 (H) 06/11/2024    CALCIUM 9.5 06/11/2024    MG 2.20 06/11/2024    PHOS 4.9 (H) 04/26/2024     LFTs:   Lab Results   Component Value Date    ALBUMIN 3.4 06/11/2024    BILITOT 1.2 06/11/2024    AST 39 (H) 06/11/2024    ALKPHOS 105 06/11/2024    ALT 48 06/11/2024     Coags:   Lab Results   Component Value Date    INR 1.6 (H) 07/26/2022     FLP:   Lab Results   Component Value Date    CHOL 165 07/26/2023    HDL 39 07/26/2023    TRIG 131 07/26/2023  "    DM:   Lab Results   Component Value Date    HGBA1C 5.5 04/30/2024    HGBA1C 5.8 07/26/2023    HGBA1C 5.9 01/26/2023    CREATININE 2.08 (H) 06/11/2024     Thyroid:   Lab Results   Component Value Date    TSH 2.1907 05/12/2022      Anemia: No results found for: "IRON", "TIBC", "FERRITIN", "SATURATEDIRO"  No results found for: "JEDMGGJO23"  No results found for: "FOLATE"     Cardiac:   Lab Results   Component Value Date    TROPONINI 0.028 06/11/2024    BNP 2,646.1 (H) 06/11/2024     Urinalysis:   Lab Results   Component Value Date    COLORU Colorless (A) 11/03/2023    NITRITE Negative 11/03/2023    KETONESU Negative 01/12/2022    UROBILINOGEN Normal 11/03/2023    WBCUA 0-5 11/03/2023       Trended Lab Data:  Recent Labs   Lab 06/11/24  1042   WBC 12.47*   HGB 13.7*   HCT 41.5*      MCV 92.8   RDW 17.7*      K 4.2   *   CO2 20*   BUN 32.6*   CREATININE 2.08*   ALBUMIN 3.4   BILITOT 1.2   AST 39*   ALKPHOS 105   ALT 48       Trended Cardiac Data:  Recent Labs   Lab 06/11/24  1042   TROPONINI 0.028   BNP 2,646.1*       Microbiology Data:  Microbiology Results (last 7 days)       ** No results found for the last 168 hours. **             Other Results:    Radiology:  Imaging Results              X-Ray Chest 1 View (Final result)  Result time 06/11/24 12:55:40      Final result by Davis Cat MD (06/11/24 12:55:40)                   Impression:      Mild cardiomegaly with generalized pulmonary vascular prominence.      Electronically signed by: Davis Cat  Date:    06/11/2024  Time:    12:55               Narrative:    EXAMINATION:  XR CHEST 1 VIEW    CLINICAL HISTORY:  afib;    TECHNIQUE:  Frontal view(s) of the chest.    COMPARISON:  Radiography 04/30/2024    FINDINGS:  No significant change in left lower lung pleuroparenchymal opacity since April.  An area of fluid along the right minor fissure also has similar appearance.  Generalized pulmonary vascular prominence with no defined acute " consolidation.  No pneumothorax.  Mildly enlarged cardiac silhouette.                                           Assessment & Plan:     CHF exacerbation  - EF 40% in 1/2024, moderate KIRSTEN and LAE, mild MR, mild to mod Pulm HTN  - Reports compliance with GDMT and Lasix   - No increased salt and alcohol intake over the weekend.  But does endorse increase intake of water.  - Lasix 80 IV x 1 in ED, monitor response and adjust accordingly  - Resume Entresto 24-26 b.i.d., Toprol 100, Lipitor 80 q.d.  - Strict intake/output  - Monitor lytes and renal function     Left loculated pleural effusion  - left loculated pleural effusion chronic, worse from prior x-ray  - CT Chest without contrast pending     AF RVR  - EKG: AFib  - Resume Toprol 50 mg daily, Xarelto 20 mg daily  - Continuous cardiac monitoring     LUIS EDUARDO on CKD  NAGMA  - Baseline Cr 1.29, Admission Cr 2.08  - Continue diuresis and repeat CMP in the morning       T2DM  - Well controlled, A1c 5.9 in 1/2024  - Low dose SSI     HTN  HLD  - Hold hydralazine  -continuing atorvastatin 80 mg q.d.     SHADI on CPAP  - Resume CPAP QHS          CODE STATUS:  Full code  Access:  PIV  Antibiotics:  None  Diet:  Heart healthy  DVT Prophylaxis:  Lovenox  GI Prophylaxis:  None  Fluids:  None      Disposition:  Discharge to home after resolution of CHF exacerbation/ sob          Vlad Mccormick MD  [facility] PGY-1  06/11/2024

## 2024-06-12 PROBLEM — R06.00 DYSPNEA: Status: ACTIVE | Noted: 2024-06-12

## 2024-06-12 PROBLEM — R07.9 CHEST PAIN: Status: ACTIVE | Noted: 2024-06-12

## 2024-06-12 LAB
ALBUMIN SERPL-MCNC: 3.1 G/DL (ref 3.4–4.8)
ALBUMIN/GLOB SERPL: 0.8 RATIO (ref 1.1–2)
ALP SERPL-CCNC: 94 UNIT/L (ref 40–150)
ALT SERPL-CCNC: 45 UNIT/L (ref 0–55)
ANION GAP SERPL CALC-SCNC: 13 MEQ/L
AST SERPL-CCNC: 32 UNIT/L (ref 5–34)
BASOPHILS # BLD AUTO: 0.05 X10(3)/MCL
BASOPHILS NFR BLD AUTO: 0.4 %
BILIRUB SERPL-MCNC: 1 MG/DL
BUN SERPL-MCNC: 32.4 MG/DL (ref 8.4–25.7)
CALCIUM SERPL-MCNC: 9.2 MG/DL (ref 8.8–10)
CHLORIDE SERPL-SCNC: 107 MMOL/L (ref 98–107)
CO2 SERPL-SCNC: 22 MMOL/L (ref 23–31)
CREAT SERPL-MCNC: 2.11 MG/DL (ref 0.73–1.18)
CREAT/UREA NIT SERPL: 15
EOSINOPHIL # BLD AUTO: 0.19 X10(3)/MCL (ref 0–0.9)
EOSINOPHIL NFR BLD AUTO: 1.5 %
ERYTHROCYTE [DISTWIDTH] IN BLOOD BY AUTOMATED COUNT: 17.6 % (ref 11.5–17)
GFR SERPLBLD CREATININE-BSD FMLA CKD-EPI: 34 ML/MIN/1.73/M2
GLOBULIN SER-MCNC: 3.9 GM/DL (ref 2.4–3.5)
GLUCOSE SERPL-MCNC: 88 MG/DL (ref 82–115)
HCT VFR BLD AUTO: 40.4 % (ref 42–52)
HGB BLD-MCNC: 13.3 G/DL (ref 14–18)
HOLD SPECIMEN: NORMAL
IMM GRANULOCYTES # BLD AUTO: 0.09 X10(3)/MCL (ref 0–0.04)
IMM GRANULOCYTES NFR BLD AUTO: 0.7 %
LYMPHOCYTES # BLD AUTO: 1.97 X10(3)/MCL (ref 0.6–4.6)
LYMPHOCYTES NFR BLD AUTO: 15.7 %
MAGNESIUM SERPL-MCNC: 2.2 MG/DL (ref 1.6–2.6)
MCH RBC QN AUTO: 30.9 PG (ref 27–31)
MCHC RBC AUTO-ENTMCNC: 32.9 G/DL (ref 33–36)
MCV RBC AUTO: 93.7 FL (ref 80–94)
MONOCYTES # BLD AUTO: 0.87 X10(3)/MCL (ref 0.1–1.3)
MONOCYTES NFR BLD AUTO: 7 %
NEUTROPHILS # BLD AUTO: 9.34 X10(3)/MCL (ref 2.1–9.2)
NEUTROPHILS NFR BLD AUTO: 74.7 %
NRBC BLD AUTO-RTO: 0.2 %
PHOSPHATE SERPL-MCNC: 4.3 MG/DL (ref 2.3–4.7)
PLATELET # BLD AUTO: 244 X10(3)/MCL (ref 130–400)
PMV BLD AUTO: 12.1 FL (ref 7.4–10.4)
POCT GLUCOSE: 100 MG/DL (ref 70–110)
POCT GLUCOSE: 119 MG/DL (ref 70–110)
POTASSIUM SERPL-SCNC: 3.8 MMOL/L (ref 3.5–5.1)
PROT SERPL-MCNC: 7 GM/DL (ref 5.8–7.6)
RBC # BLD AUTO: 4.31 X10(6)/MCL (ref 4.7–6.1)
SODIUM SERPL-SCNC: 142 MMOL/L (ref 136–145)
WBC # SPEC AUTO: 12.51 X10(3)/MCL (ref 4.5–11.5)

## 2024-06-12 PROCEDURE — 80053 COMPREHEN METABOLIC PANEL: CPT

## 2024-06-12 PROCEDURE — 97161 PT EVAL LOW COMPLEX 20 MIN: CPT

## 2024-06-12 PROCEDURE — 36415 COLL VENOUS BLD VENIPUNCTURE: CPT

## 2024-06-12 PROCEDURE — 63600175 PHARM REV CODE 636 W HCPCS

## 2024-06-12 PROCEDURE — 99900035 HC TECH TIME PER 15 MIN (STAT)

## 2024-06-12 PROCEDURE — 25000242 PHARM REV CODE 250 ALT 637 W/ HCPCS

## 2024-06-12 PROCEDURE — 84100 ASSAY OF PHOSPHORUS: CPT

## 2024-06-12 PROCEDURE — 94660 CPAP INITIATION&MGMT: CPT

## 2024-06-12 PROCEDURE — 94761 N-INVAS EAR/PLS OXIMETRY MLT: CPT

## 2024-06-12 PROCEDURE — 25000003 PHARM REV CODE 250

## 2024-06-12 PROCEDURE — 21400001 HC TELEMETRY ROOM

## 2024-06-12 PROCEDURE — 83735 ASSAY OF MAGNESIUM: CPT

## 2024-06-12 PROCEDURE — 97165 OT EVAL LOW COMPLEX 30 MIN: CPT

## 2024-06-12 PROCEDURE — 85025 COMPLETE CBC W/AUTO DIFF WBC: CPT

## 2024-06-12 PROCEDURE — 27100171 HC OXYGEN HIGH FLOW UP TO 24 HOURS

## 2024-06-12 RX ORDER — METOPROLOL TARTRATE 1 MG/ML
5 INJECTION, SOLUTION INTRAVENOUS ONCE
Status: DISCONTINUED | OUTPATIENT
Start: 2024-06-12 | End: 2024-06-12

## 2024-06-12 RX ORDER — FUROSEMIDE 10 MG/ML
40 INJECTION INTRAMUSCULAR; INTRAVENOUS EVERY 12 HOURS
Status: DISCONTINUED | OUTPATIENT
Start: 2024-06-12 | End: 2024-06-13

## 2024-06-12 RX ORDER — METOPROLOL TARTRATE 50 MG/1
50 TABLET ORAL NIGHTLY
Status: DISCONTINUED | OUTPATIENT
Start: 2024-06-12 | End: 2024-06-13

## 2024-06-12 RX ADMIN — FUROSEMIDE 40 MG: 10 INJECTION, SOLUTION INTRAMUSCULAR; INTRAVENOUS at 08:06

## 2024-06-12 RX ADMIN — HYDRALAZINE HYDROCHLORIDE 50 MG: 50 TABLET ORAL at 08:06

## 2024-06-12 RX ADMIN — SACUBITRIL AND VALSARTAN 2 TABLET: 24; 26 TABLET, FILM COATED ORAL at 08:06

## 2024-06-12 RX ADMIN — RIVAROXABAN 20 MG: 10 TABLET, FILM COATED ORAL at 08:06

## 2024-06-12 RX ADMIN — METOPROLOL SUCCINATE 100 MG: 50 TABLET, EXTENDED RELEASE ORAL at 08:06

## 2024-06-12 RX ADMIN — EMPAGLIFLOZIN 10 MG: 10 TABLET, FILM COATED ORAL at 08:06

## 2024-06-12 RX ADMIN — ATORVASTATIN CALCIUM 80 MG: 40 TABLET, FILM COATED ORAL at 08:06

## 2024-06-12 RX ADMIN — SACUBITRIL AND VALSARTAN 2 TABLET: 24; 26 TABLET, FILM COATED ORAL at 09:06

## 2024-06-12 RX ADMIN — FAMOTIDINE 20 MG: 20 TABLET, FILM COATED ORAL at 08:06

## 2024-06-12 RX ADMIN — FUROSEMIDE 40 MG: 10 INJECTION, SOLUTION INTRAMUSCULAR; INTRAVENOUS at 10:06

## 2024-06-12 RX ADMIN — CETIRIZINE HYDROCHLORIDE 5 MG: 5 TABLET ORAL at 08:06

## 2024-06-12 RX ADMIN — METOPROLOL TARTRATE 50 MG: 50 TABLET, FILM COATED ORAL at 09:06

## 2024-06-12 RX ADMIN — HYDRALAZINE HYDROCHLORIDE 50 MG: 50 TABLET ORAL at 09:06

## 2024-06-12 NOTE — MEDICAL/APP STUDENT
[facility] Medicine Wards   Progress Note     Resident Team: [facility] Medicine List 3  Attending Physician: Demetrius Arrington MD  Resident: Maru Lynn MD  Intern: Vlad Mccormick MD  Date of Admit: 6/11/2024    Subjective:      Brief HPI:  Jamey Duke is a 63 y.o.  male who with a history of  PMH of CHF (EF 40% in 1/2023), CKD, Afib on Xarelto with failed DCCV and ablation, and SHADI on CPAP, left-sided loculated pleural effusion . The patient presented to [facility] ED   on 6/11/2024  with a primary complaint of Weakness and Shortness of Breath (C/o weakness, sob. Sent from cardiology clinic for a fib)     Patient reports for the past month he was getting more more short of breath.  He reports he was unable to walk more than 10-15 feet.  Patient was reports after his previous admission for heart failure exacerbation he was discharged with home health and metoprolol was increased from 50-->100. He feels that his symptoms returned shortly after discharge.  He was noticed he is gained about 7 lb in the last 2 weeks.  He was attempted to call home health in an attempt to adjust his medication but was unable to reach them.  He works outside and water intake has increased to up to 8 bottles of 8 fluid oz/day due to summer heat.  This morning he was seen in cardiology clinic for routine follow up.  Due to severity of symptoms and debilitation with shortness of breath patient was asked to go to the emergency department and be evaluated.      Per chart review it was noted in the cardiology note from earlier this morning the patient was complaining of atypical chest pain and orthopnea.  This was not endorsed by the patient on my interview.      Patient has a extensive cardiology workup previously.  He was most recent echo was completed on 04/26/2024 which showed an ejection fraction of 40%.  It was indeterminate patient was diastolic dysfunction due to poor acoustic windows.  There was moderate global hypokinesis present.  Patient was  also had a cardiac catheterization 07/29/2022 that revealed normal coronary arteries.        In the emergency department laboratory workup was notable for a white count of 12.47, bicarb 20, BUN of 32.6 and a creatinine of 2.08 (baseline ~1.5).  BNP was markedly elevated in the setting of CKD at 2646 (2,168 during last admission).  Chest x-ray and CT notable for stable loculated pleural effusions of lower lobe regions R>L and effusion at right minor fissure, seen on imaging during April admission. Cardiomegaly also noted on chest x-ray.  EKG showed unchanged afib with RVR and rate of 114.     Patient was denies any recent syncopal episodes, palpitations, or chest pain.     Interval History:   Patient feels somewhat less short of breath today, though still far from his baseline. UOP 1,200 total (400 overnight) after 80 mg iv lasix inn ED. He notes that his abdomen is swollen, but he has not noticed leg swelling. He has a chronic cough that comes and goes and is bothering him this morning, productive of white sputum. He was nauseous and lightheaded yesterday, but these symptoms have resolved. He is about to eat breakfast.      Review of Systems:  Review of Systems   HENT:  Negative for congestion and sore throat.    Eyes:  Negative for blurred vision.   Respiratory:  Positive for cough, sputum production and shortness of breath.    Cardiovascular:  Positive for orthopnea. Negative for chest pain and leg swelling.   Gastrointestinal:  Negative for abdominal pain, constipation, diarrhea, nausea and vomiting.   Neurological:  Negative for dizziness and headaches.          Objective:     Vital Signs (Most Recent):  Temp: 97.8 °F (36.6 °C) (06/12/24 0748)  Pulse: (!) 118 (06/12/24 0748)  Resp: 18 (06/12/24 0748)  BP: (!) 139/90 (06/12/24 0748)  SpO2: (!) 93 % (06/12/24 0748) Vital Signs (24h Range):  Temp:  [97.4 °F (36.3 °C)-98 °F (36.7 °C)] 97.8 °F (36.6 °C)  Pulse:  [] 118  Resp:  [16-30] 18  SpO2:  [92 %-98 %] 93  %  BP: (126-160)/() 139/90       Physical Examination:  Physical Exam  Constitutional:       General: He is not in acute distress.     Appearance: He is obese.   HENT:      Head: Normocephalic and atraumatic.      Nose: Nose normal. No congestion or rhinorrhea.      Mouth/Throat:      Mouth: Mucous membranes are moist.   Eyes:      Conjunctiva/sclera: Conjunctivae normal.   Neck:      Comments: JVD ~ 3 cm from sternal angle    Cardiovascular:      Rate and Rhythm: Tachycardia present. Rhythm irregular.   Pulmonary:      Effort: Pulmonary effort is normal. No respiratory distress.      Breath sounds: No stridor. No wheezing.      Comments: Decreased breath sounds on the right  Abdominal:      General: There is distension.      Palpations: Abdomen is soft.      Tenderness: There is no abdominal tenderness. There is no guarding.   Musculoskeletal:      Right lower leg: No edema.      Left lower leg: No edema.      Comments: 2+ DP and PT pulses   Skin:     General: Skin is warm and dry.      Coloration: Skin is not jaundiced or pale.   Neurological:      Mental Status: He is alert.   Psychiatric:         Mood and Affect: Mood normal.         Behavior: Behavior normal.         Laboratory:  Lab Results   Component Value Date     06/12/2024    K 3.8 06/12/2024     06/12/2024    CO2 22 (L) 06/12/2024    BUN 32.4 (H) 06/12/2024    CREATININE 2.11 (H) 06/12/2024    CALCIUM 9.2 06/12/2024    BILIDIR 0.1 01/12/2022    IBILI 0.20 01/12/2022    ALKPHOS 94 06/12/2024    AST 32 06/12/2024    ALT 45 06/12/2024    MG 2.20 06/12/2024    PHOS 4.3 06/12/2024        Lab Results   Component Value Date    WBC 12.51 (H) 06/12/2024    RBC 4.31 (L) 06/12/2024    HGB 13.3 (L) 06/12/2024    HCT 40.4 (L) 06/12/2024    MCV 93.7 06/12/2024    MCH 30.9 06/12/2024    MCHC 32.9 (L) 06/12/2024    RDW 17.6 (H) 06/12/2024     06/12/2024    MPV 12.1 (H) 06/12/2024        Microbiology:   Microbiology Results (last 7 days)       **  No results found for the last 168 hours. **            Other Results:  EKG (my interpretation): afib with RVR similar to previous studies    Radiology:  Imaging Results              CT Chest Without Contrast (Final result)  Result time 06/11/24 17:26:31      Final result by Geeta Campos MD (06/11/24 17:26:31)                   Impression:      1. No significant interval change compared to 04/24/2024.  2. Mediastinal lymphadenopathy and loculated bilateral pleural effusions      Electronically signed by: Geeta Campos  Date:    06/11/2024  Time:    17:26               Narrative:    EXAMINATION:  CT CHEST WITHOUT CONTRAST    CLINICAL HISTORY:  Pleural effusion, malignancy suspected;    TECHNIQUE:  CT imaging of the chest without IV contrast. Axial, coronal and sagittal reformatted images are reviewed. Dose length product is 528 mGycm. Automatic exposure control, adjustment of mA/kV or iterative reconstruction technique was used to limit radiation dose.    COMPARISON:  CT chest dated 04/24/2024    FINDINGS:  Lungs and large airways: No focal pulmonary nodule.  Mild septal thickening in the lung bases.    Pleura: Similar small bilateral loculated pleural effusions, right greater than left.    Heart and vasculature: The heart is stable in size.  There is no pericardial effusion.  There are scattered coronary calcifications.    Mediastinum and ashley: Similar enlarged mediastinal lymph nodes.  For example a right paratracheal lymph node measures 1.8 cm in short axis (series 6, image 31).    Chest wall/axilla and lower neck: No significant findings.    Upper abdomen: No significant findings.    Bones: No acute osseous findings.                                       X-Ray Chest 1 View (Final result)  Result time 06/11/24 12:55:40      Final result by Davis Cat MD (06/11/24 12:55:40)                   Impression:      Mild cardiomegaly with generalized pulmonary vascular prominence.      Electronically  signed by: Davis Cat  Date:    06/11/2024  Time:    12:55               Narrative:    EXAMINATION:  XR CHEST 1 VIEW    CLINICAL HISTORY:  afib;    TECHNIQUE:  Frontal view(s) of the chest.    COMPARISON:  Radiography 04/30/2024    FINDINGS:  No significant change in left lower lung pleuroparenchymal opacity since April.  An area of fluid along the right minor fissure also has similar appearance.  Generalized pulmonary vascular prominence with no defined acute consolidation.  No pneumothorax.  Mildly enlarged cardiac silhouette.                                      Current Medications:     Infusions:        Scheduled:   atorvastatin  80 mg Oral Daily    cetirizine  5 mg Oral Daily    empagliflozin  10 mg Oral Daily    famotidine  20 mg Oral Daily    hydrALAZINE  50 mg Oral BID    metoprolol succinate  100 mg Oral Daily    rivaroxaban  20 mg Oral Daily    sacubitriL-valsartan  2 tablet Oral BID        PRN:    Current Facility-Administered Medications:     acetaminophen, 650 mg, Oral, Q6H PRN    dextrose 10%, 12.5 g, Intravenous, PRN    dextrose 10%, 25 g, Intravenous, PRN    glucagon (human recombinant), 1 mg, Intramuscular, PRN    glucose, 16 g, Oral, PRN    glucose, 24 g, Oral, PRN    insulin aspart U-100, 0-5 Units, Subcutaneous, QID (AC + HS) PRN    labetalol, 10 mg, Intravenous, Q6H PRN    naloxone, 0.02 mg, Intravenous, PRN    sodium chloride 0.9%, 10 mL, Intravenous, Q12H PRN    Antibiotics:  none      Intake/Output Summary (Last 24 hours) at 6/12/2024 0856  Last data filed at 6/12/2024 0549  Gross per 24 hour   Intake 240 ml   Output 1200 ml   Net -960 ml       Lines/Drains/Airways       Peripheral Intravenous Line  Duration                  Peripheral IV - Single Lumen 06/11/24 1046 20 G Right Antecubital <1 day                      Assessment & Plan:   Jamey is a 63 year old man with hx of HFrEF at 40%, persistent Afib, and TIID presenting with heart failure exacerbation. He was recently admitted for  this problem in April, and metoprolol was increased from 50 to 100, but his symptoms returned shortly after D/C despite compliance with all medications. This may be attributed to increased water intake during the summer heat. Takes Lasix 40 mg Bid at home; we will start 80 mg iv with goal of 2.5 L UOP/day.     CHF exacerbation  - EF 40% in 1/2024, moderate KIRSTEN and LAE, mild MR, mild to mod Pulm HTN  - Reports compliance with home metoprolol succinate 100 mg, entresto 49/51 Bid, jardiance 10 mg, and lasix 40 mg BID  - No increased salt and alcohol intake over the weekend.  But does endorse increase intake of water.  - Received lasix 80 IV x 1 in ED, poor response with only 400 mg overnight  - Start lasix 80 iv Bid   - Resume home meds  - Strict intake/output  - Monitor lytes and renal function     Left loculated pleural effusion  - left loculated pleural effusion chronic, worse from prior x-ray  - CT Chest with similar findings of loculated effusion  - Pt reports effusion has never been tapped  - Consider tapping for symptom relief and to confirm transudative nature.     AF RVR  - EKG: AFib  - Resume Toprol 50 mg daily, Xarelto 20 mg daily  - Continuous cardiac monitoring     LUIS EDUARDO on CKD  NAGMA  - Baseline Cr 1.29, Admission Cr 2.08-->2.11  - Continue diuresis and daily CMPs        T2DM  - Well controlled, A1c 5.9 in 1/2024  - Low dose SSI     HTN  HLD  - Hold hydralazine  -continuing atorvastatin 80 mg q.d.     SHADI on CPAP  - Resume CPAP QHS              CODE STATUS:  Full code  Access:  PIV  Antibiotics:  None  Diet:  Heart healthy  DVT Prophylaxis:  Lovenox  GI Prophylaxis:  None  Fluids:  None      Disposition:  Discharge to home after resolution of CHF exacerbation/ sob    Nadege Adame, MS3  [facility]

## 2024-06-12 NOTE — PT/OT/SLP EVAL
Occupational Therapy   Evaluation and Discharge Note    Name: Jamey Duke  MRN: [id number]  Admitting Diagnosis:   Weakness  A-fib  Secondary hypertension (Primary)   Dyspnea, unspecified type   CHF exacerbation   Recent Surgery: * No surgery found *      Recommendations:     Discharge Recommendations: No Therapy Indicated  Discharge Equipment Recommendations: shower chair  Barriers to discharge:  None    Assessment:     Jamey Duke is a 63 y.o. male with a medical diagnosis of CHF exacerbation . At this time, patient is functioning at their prior level of function and does not require further acute OT services.     Plan:     During this hospitalization, patient does not require further acute OT services.  Please re-consult if situation changes.    Plan of Care Reviewed with: patient, friend    Subjective     Chief Complaint: none stated  Patient/Family Comments/goals: return home    Occupational Profile:  Living Environment: Pt lives alone in single story house with no steps and tub shower combo   Previous level of function: independent basic and I ADL's and ambulated without AD   Roles and Routines: Pt works- landscape business; Pt drives, shops, cooks and performs household chores  Equipment Used at home: none  Assistance upon Discharge: friend if needed    Pain/Comfort:  Pain Rating 1: 0/10  Pain Rating Post-Intervention 1: 0/10    Patients cultural, spiritual, Congregation conflicts given the current situation: no    Objective:     Communicated with: Nurse Rondon prior to session.  Patient found HOB elevated with peripheral IV, telemetry upon OT entry to room.    General Precautions: Standard,    Orthopedic Precautions: N/A  Braces: N/A  Respiratory Status: Room air     HR documented lowest at rest 115 and at highest during ambulation 155    Occupational Performance:    Bed Mobility:    Patient completed Supine to Sit with independence  Patient completed Sit to Supine with independence    Functional  "Mobility/Transfers:  Patient completed Sit <> Stand Transfer with independence  with  no assistive device   Patient completed Toilet Transfer Step Transfer technique with independence with  no AD  Functional Mobility: independent ambulating without AD in room for self care tasks and in hallway 130 feet; no LOB     Activities of Daily Living:  Grooming: independence standing at sink  Upper Body Dressing: independence    Lower Body Dressing: independence don socks EOB   Toileting: independence clothing mgt and hygiene    Cognitive/Visual Perceptual:  Cognitive/Psychosocial Skills:  -       Oriented to: Person, Place, Time, and Situation   -       Follows Commands/attention:Follows multistep  commands  -       Safety awareness/insight to disability: intact     Physical Exam:    Balance - sitting Independent; standing independent     Pt is right hand dominant    B UE AROM, strength and coordination WNL's     Treatment & Education:  Pt educated on use of call bell to call for assist as needed; Pt educated on energy conservation and pacing as needed; pt educated on use shower chair in tub shower to conserve energy. Pt expressed understanding however reported that he "can do it in standing" .      Patient left HOB elevated with all lines intact, call button in reach, and nurse  notified    GOALS:   Multidisciplinary Problems       Occupational Therapy Goals       Not on file                    History:     Past Medical History:   Diagnosis Date    Atrial flutter     Diabetes mellitus     Hypercholesteremia     Hypertension     Morbid obesity     Obese     Tobacco user          Past Surgical History:   Procedure Laterality Date    ANGIOGRAM, CORONARY, WITH LEFT HEART CATHETERIZATION N/A 07/29/2022    Procedure: Angiogram, Coronary, with Left Heart Cath;  Surgeon: Yossi Zuniga MD;  Location: [facility] CATH LAB;  Service: Cardiology;  Laterality: N/A;    Cardioversion x 2      COLONOSCOPY  10/24/2023    Comprehensive " electrophysiologic eval. including insertion & repositioning of multiple electrode catheters with induction or attempted induction of arrhythmias with left atrial pacing & recordingfrom coronary sinus or left atrium  07/10/2018    comprehensive electrophysiologic eval. insertion & repositioning multiple electrode catheters with induction or attempted induction of an arrhythmia with right atrial pacing and recording, right ventricular pacing and recording  07/10/2018    Destruction of Conduction Mechanism, Percutaneous Approach  07/10/2018       Time Tracking:     OT Date of Treatment: 06/12/24  OT Start Time: 1024  OT Stop Time: 1037  OT Total Time (min): 13 min    Billable Minutes:Evaluation 13 min     6/12/2024

## 2024-06-12 NOTE — PROGRESS NOTES
Inpatient Nutrition Evaluation    Admit Date: 6/11/2024   Total duration of encounter: 1 day   Patient Age: 63 y.o.    Nutrition Recommendation/Prescription     Heart Healthy, fluid restriction per MD  Monitor Weight daily    Nutrition Assessment     Chart Review    Reason Seen: continuous nutrition monitoring    Malnutrition Screening Tool Results   Have you recently lost weight without trying?: No  Have you been eating poorly because of a decreased appetite?: No   MST Score: 0   Diagnosis:  CHF exacerbation, Left loculated pleural effusion, LUIS EDUARDO on CKD, NAGMA, DM, HTN, HLD, SHADI    Relevant Medical History: CHF, CKD, Afib, SHADI, Left sided pleural effusion    Scheduled Medications:  atorvastatin, 80 mg, Daily  cetirizine, 5 mg, Daily  empagliflozin, 10 mg, Daily  famotidine, 20 mg, Daily  furosemide (LASIX) injection, 40 mg, Q12H  hydrALAZINE, 50 mg, BID  metoprolol succinate, 100 mg, Daily  rivaroxaban, 20 mg, Daily  sacubitriL-valsartan, 2 tablet, BID    Continuous Infusions:   PRN Medications:   Current Facility-Administered Medications:     acetaminophen, 650 mg, Oral, Q6H PRN    dextrose 10%, 12.5 g, Intravenous, PRN    dextrose 10%, 25 g, Intravenous, PRN    glucagon (human recombinant), 1 mg, Intramuscular, PRN    glucose, 16 g, Oral, PRN    glucose, 24 g, Oral, PRN    insulin aspart U-100, 0-5 Units, Subcutaneous, QID (AC + HS) PRN    labetalol, 10 mg, Intravenous, Q6H PRN    naloxone, 0.02 mg, Intravenous, PRN    sodium chloride 0.9%, 10 mL, Intravenous, Q12H PRN    Recent Labs   Lab 06/11/24  1042 06/12/24  0411    142   K 4.2 3.8   CALCIUM 9.5 9.2   PHOS  --  4.3   MG 2.20 2.20   CO2 20* 22*   BUN 32.6* 32.4*   CREATININE 2.08* 2.11*   EGFRNORACEVR 35 34   GLUCOSE 104 88   BILITOT 1.2 1.0   ALKPHOS 105 94   ALT 48 45   AST 39* 32   ALBUMIN 3.4 3.1*   WBC 12.47* 12.51*   HGB 13.7* 13.3*   HCT 41.5* 40.4*     Nutrition Orders:  Diet Heart Healthy Fluid - 1500mL      Appetite/Oral Intake: good/%  "of meals  Factors Affecting Nutritional Intake: none identified  Social Needs Impacting Access to Food: none identified  Food/Rastafarian/Cultural Preferences: none reported  Food Allergies: no known food allergies  Last Bowel Movement: 24  Wound(s):  skin intact    Comments    24 Pt reports good appetite, denies difficulty eating; Follows low sodium diet at home; reports UBW in the 290's, current weight elevated realated to fluid, on fluid restriction per MD, continue Low Na diet    Anthropometrics    Height: 5' 10" (177.8 cm),    Last Weight: (!) 139.1 kg (306 lb 10.6 oz) (24 0600), Weight Method: Bed Scale  BMI (Calculated): 44  BMI Classification: obese grade III (BMI >/=40)        Ideal Body Weight (IBW), Male: 166 lb     % Ideal Body Weight, Male (lb): 185.36 %                 Usual Body Weight (UBW), k kg  % Usual Body Weight: 104.02     Usual Weight Provided By: patient    Wt Readings from Last 5 Encounters:   24 (!) 139.1 kg (306 lb 10.6 oz)   24 (!) 139.3 kg (307 lb)   24 (!) 136.9 kg (301 lb 12.8 oz)   24 (!) 138 kg (304 lb 3.8 oz)   24 (!) 139.4 kg (307 lb 6.4 oz)     Weight Change(s) Since Admission: elevated  Wt Readings from Last 1 Encounters:   24 0600 (!) 139.1 kg (306 lb 10.6 oz)   24 1900 (!) 139.6 kg (307 lb 11.2 oz)   24 1817 (!) 139.6 kg (307 lb 11.2 oz)   24 1001 (!) 139.3 kg (307 lb 1.6 oz)   Admit Weight: (!) 139.3 kg (307 lb 1.6 oz) (24 1001), Weight Method: Bed Scale    Patient Education     Not applicable.    Nutrition Goals & Monitoring     Dietitian will monitor: food and beverage intake, weight change, and electrolyte/renal panel  Discharge planning: continue Heart Healthy diet  Nutrition Risk/Follow-Up: low (follow-up in 5-7 days)  Patients assigned 'low nutrition risk' status do not qualify for a full nutritional assessment but will be monitored and re-evaluated in a 5-7 day time period. Please consult " if re-evaluation needed sooner.

## 2024-06-12 NOTE — CONSULTS
[facility]  Inpatient Cardiology Consult    Reason for Consult: heart failure and atrial fibrillation with RVR                               HPI:  Jamey Duke 63 y.o. male with  NICMO/HFrEF (EF-40% per ECHO 4.26.24 ), h/o persistent AF/AFL (s/p multiple attempted DCCVs and an ablation but has had recurrence of AFL), Carotid artery stenosis, HTN, HLD, DM II, SHADI/CPAP who was admitted from cardiology clinic yesterday for further evaluation of increased dyspnea on minimal exertion, PND, orthopnea and abdominal distention that have been progressively worsening over the last month.   Pt was noted to have a 7lb weight gain in the last 2 weeks. In the ED, he was noted to have BNP 2646, LUIS EDUARDO on CKD with creatinine 2.08 from baseline 1.56. EKG showed afib with RVR (HR 114bpm). CXR and CT chest showed pulmonary vascular prominence and small bilateral loculated pleural effusions (unchanged from 4/2024).  Pt was given lasix 80mg IV x1 in ED and is currently on lasix 40mg IV bid. Urine output today is 1.8 L.                                                                                                                                                                                                                                                                                                                                                                                                                                                                                  CARDIAC TESTING:  Results for orders placed during the hospital encounter of 04/24/24    Echo    Interpretation Summary    Technically difficult study. Optison contrast used.    Atrial fibrillation observed.    Left Ventricle: The left ventricle is normal in size. Moderate global hypokinesis present. There is moderately reduced systolic function. Biplane (2D) method of discs ejection fraction is 40%. There is indeterminate diastolic function.    Right Ventricle: Right  ventricle was not well visualized due to poor acoustic window.    Left Atrium: Left atrium is moderately dilated. The left atrium volume index MOD is 42.6 mL/m2.    Aortic Valve: The aortic valve is a trileaflet valve. There is no stenosis. There is no significant regurgitation.    Mitral Valve: There is no stenosis. There is no significant regurgitation.    Tricuspid Valve: There is no significant regurgitation.    Pulmonic Valve: There is no significant regurgitation.    Aorta: Aortic root is normal in size measuring 3.4 cm.    IVC/SVC: Intermediate venous pressure at 8 mmHg.    Results for orders placed during the hospital encounter of 06/30/22    Nuclear Stress - Cardiology Interpreted    Interpretation Summary    Abnormal myocardial perfusion scan.    There are two significant perfusion abnormalities.    Perfusion Abnormality #1 - There is a moderate to severe intensity, moderate to large sized, reversible perfusion abnormality that is consistent with ischemia in the basal to apical lateral apical wall(s) in the typical distribution of the LCX territory.    Perfusion Abnormality #2 - There is a small to moderate sized, moderate intensity, reversible perfusion abnormality that is consistent with ischemia in the basal to mid anterior wall(s) in the typical distribution of the LAD territory.    There are no other significant perfusion abnormalities.    The gated perfusion images showed an ejection fraction of 36% at rest. The gated perfusion images showed an ejection fraction of 33% post stress.    The EKG portion of this study is abnormal but not diagnostic.    The patient reported no chest pain during the stress test.    There were no arrhythmias during stress.    If clinically indicated, consider further evaluation with coronary angiogram.     Results for orders placed during the hospital encounter of 07/29/22    Cardiac catheterization    Conclusion  · The pre-procedure left ventricular end diastolic pressure  was 22.  · The estimated blood loss was none.  · The coronary arteries were normal..    FINDINGS  The patient has No CAD  Blood loss:  less than 15 cc.    RECOMMENDATIONS  Medical management -- maximize GDMT  Risk factor modifications -- smoking cessation  Activity -- avoid straining with affected limb for one week  Exercise on regular basis.    The procedure log was documented by Documenter: Karely Skinner RN and verified by Yossi Zuniga MD.    Date: 7/29/2022  Time: 10:33 AM        Prior to Admission medications    Medication Sig Start Date End Date Taking? Authorizing Provider   blood sugar diagnostic Strp Inject 1 strip into the skin 2 (two) times a day. 5/12/22   Geetha Pickens FNP   blood-glucose meter kit   True Metrix Glucometer, See Instructions, Use to check blood glucose daily, # 1 EA, 0 Refill(s), Pharmacy:  and [facility], 177.8, cm, Height/Length Dosing, 01/12/22 9:55:00 CST, 148.4, kg, Weight Dosing, 01/12/22 9:55:00 CST 1/14/22   Provider, Historical   cetirizine (ZYRTEC) 10 MG tablet Take 1 tablet (10 mg total) by mouth nightly as needed for Allergies or Rhinitis. 1/26/24   Geetha Pickens FNP   dapagliflozin propanediol (FARXIGA) 5 mg Tab tablet Take 5 mg by mouth once daily.    Provider, Historical   EASY TOUCH TWIST LANCETS 33 gauge Misc USE AS DIRECTED TO CHECK BLOOD GLUCOSE DAILY 1/14/22   Provider, Historical   empagliflozin (JARDIANCE) 10 mg tablet Take 1 tablet (10 mg total) by mouth once daily. 9/15/23 9/14/24  Christin Hartman FNP   famotidine (PEPCID) 20 MG tablet Take 20 mg by mouth 2 (two) times daily.    Provider, Historical   fluticasone propionate (FLONASE) 50 mcg/actuation nasal spray 1 spray (50 mcg total) by Each Nostril route once daily. 1/26/24   Geetha Pickens FNP   furosemide (LASIX) 40 MG tablet Take 1 tablet (40 mg total) by mouth 2 (two) times daily. 4/2/24 4/2/25  Christin Hartman FNP   hydrALAZINE (APRESOLINE) 50 MG tablet Take 1  tablet (50 mg total) by mouth 2 (two) times daily. 4/2/24 4/2/25  Christin Hartman FNP   LANCETS MISC   True Metrix Lancets, See Instructions, Use to check blood glucose daily, # 100 EA, 11 Refill(s), Pharmacy: [facility], 177.8, cm, Height/Length Dosing, 01/12/22 9:55:00 CST, 148.4, kg, Weight Dosing, 01/12/22 9:55:00 CST 1/14/22   Provider, Historical   metoprolol succinate (TOPROL-XL) 100 MG 24 hr tablet Take 1 tablet (100 mg total) by mouth once daily. 4/27/24 4/27/25  Rachel Chance,    potassium chloride SA (KLOR-CON M20) 20 MEQ tablet Take 1 tablet (20 mEq total) by mouth 2 (two) times daily. 4/2/24   Christin Hartman FNP   pravastatin (PRAVACHOL) 40 MG tablet Take 1 tablet (40 mg total) by mouth every evening. 4/2/24 4/2/25  Christin Hartman FNP   sacubitriL-valsartan (ENTRESTO) 49-51 mg per tablet Take 1 tablet by mouth 2 (two) times daily. 12/11/23 12/10/24  Christin Hartman FNP   tadalafiL (CIALIS) 20 MG Tab Take 20 mg by mouth daily as needed.  Patient not taking: Reported on 6/11/2024 4/4/24   Provider, Historical   testosterone cypionate (DEPOTESTOTERONE CYPIONATE) 200 mg/mL injection Inject 200 mg into the muscle every 14 (fourteen) days. 5/15/24   Provider, Historical   XARELTO 20 mg Tab Take 1 tablet (20 mg total) by mouth once daily. 9/15/23 9/14/24  Christin Hartman FNP       Inpatient Scheduled Medications:   atorvastatin  80 mg Oral Daily    cetirizine  5 mg Oral Daily    empagliflozin  10 mg Oral Daily    famotidine  20 mg Oral Daily    furosemide (LASIX) injection  40 mg Intravenous Q12H    hydrALAZINE  50 mg Oral BID    metoprolol  5 mg Intravenous Once    metoprolol succinate  100 mg Oral Daily    rivaroxaban  20 mg Oral Daily    sacubitriL-valsartan  2 tablet Oral BID     Continuous Infusions:      ROS:                                                                                                                                                                              Negative except as stated in the history of present illness. See HPI for details.    PHYSICAL EXAM:  VITALS: T 97.7 °F (36.5 °C)   /74   P (!) 120   RR 18   O2 (!) 94 %    General: alert and oriented/no acute distress  Eye: EOMI/normal conjunctiva/no xanthelasma  HENT: normocephalic/moist oral mucosa  Neck: supple/nontender/no carotid bruit  Respiratory: lungs CTA/nonlabored respirations/BS equal/symmetrical expansion/no  chest wall tenderness  Cardiovascular: normal rate/normal rhythm/no murmur/normal peripheral perfusion/no  edema/no JVD  Gastrointestinal: soft/nontender  Musculoskeletal: normal ROM  Integumentary: warm/dry/pink/intact  Neurologic: alert/oriented/normal sensory/no focal deficits  Psychiatric: cooperative/appropriate mood and affect/normal judgment    All medications, laboratory studies, cardiac diagnostic imaging independently reviewed.     ASSESSMENT/PLAN:    Acute on chronic combined heart failure  Pt currently diuresing well with lasix 40mg IV bid and reports feeling better. Urine output of 1.8 L so far today but no ins recorded. Can increase lasix to 60mg IV bid .  Recommend transitioning to torsemide 20mg BID before discharge to see if dose leads to adequate diuresis  BMP bid with electrolyte repletion as necessary  Continue current dose entresto . In outpatient setting suggest stopping or decreasing hydralazine to try to increase entresto further. Continue jardiance and increase toprol as below    Atrial fibrillation  CHADsVASC 3  Continue xarelto 20mg  Rate better controlled now, can increase metoprolol to 150mg daily.

## 2024-06-12 NOTE — PT/OT/SLP EVAL
Physical Therapy Evaluation & Discharge Note    Patient Name:  Jamey Duke   MRN:  [id number]    Recommendations     Therapy Intensity Recommendations at Discharge: No Therapy Indicated  Discharge Equipment Recommendations: none   Equipment to be obtained for discharge: none.  Barriers to discharge: none    Assessment     Jamey Duke is a 63 y.o. male admitted with a medical diagnosis of CHF exacerbation, a-fib with RVR, L pleural effusion.        Patient was seen by therapy for evaluation only.  Patient does not require further acute PT services.     Recent Surgery: * No surgery found *      Plan     Patient discharged from acute PT Services on 06/12/24.    Based on current functional levels observed, patient does not require further acute PT services.    Re-consult PT Services if patient's status changes and therapy services warranted.    Subjective     Communicated with patient's nurse  prior to session.    Patient agreeable to participate in evaluation.     Chief Complaint: none  Patient/Family Comments/goals: to go home  Pain/Comfort:  Pain Rating 1: 0/10  Pain Rating Post-Intervention 1: 0/10    Patients cultural, spiritual, Caodaism conflicts given the current situation:      Social History  Living Environment: Patient lives alone in a single level home, with no steps, with tub-shower combo.  Functional Level: Prior to admission patient ambulated without assistive device and was independent in ADL's.  Equipment Used at Home: none  Equipment owned (not currently used): none.  Assistance Upon Discharge: none needed.    Objective     Patient found supine in bed and with HOB elevated with peripheral IV, telemetry  upon PT entry to room.    General Precautions: Standard,     Orthopedic Precautions:    Braces:    Respiratory Status: room air      Exams:  Orientation: Patient is oriented to person, place, time, situation  Commands: Patient follows commands consistently  BILAT UE ROM/strength - defer to OT -  see OT note for details  RLE ROM: WNL  RLE Strength: WNL  LLE ROM: WNL  LLE Strength: WNL    Functional Mobility:    Bed Mobility:  Supine to Sit: independence  Sit to Supine: independence    Transfers:  Sit to Stand: independence with no assistive device    Gait:  Patient ambulated 130ft with no assistive device and independence.  Patient demonstrates :       steady gait       symmetrical step length       upright posture.    Other Mobility:  not assessed    Balance:  Sit  Static: NORMAL: No deviations seen in posture held statically  Dynamic: NORMAL: No deviations seen in posture held dynamically  Stand  Static: NORMAL: No deviations seen in posture held statically  Dynamic: NORMAL: No deviations seen in posture held dynamically    Additional Treatment Session  n/a    Patient left sitting edge of bed with all lines intact and call button in reach.    Education     White board updated regarding patient's safest level of mobility with staff assistance.    Goals - No STG's or LTG's established secondary to patient was seen for evaluation and discharge     Multidisciplinary Problems       Physical Therapy Goals       Not on file                    History     Past Medical History:   Diagnosis Date    Atrial flutter     Diabetes mellitus     Hypercholesteremia     Hypertension     Morbid obesity     Obese     Tobacco user      Past Surgical History:   Procedure Laterality Date    ANGIOGRAM, CORONARY, WITH LEFT HEART CATHETERIZATION N/A 07/29/2022    Procedure: Angiogram, Coronary, with Left Heart Cath;  Surgeon: Yossi Zuniga MD;  Location: [facility] CATH LAB;  Service: Cardiology;  Laterality: N/A;    Cardioversion x 2      COLONOSCOPY  10/24/2023    Comprehensive electrophysiologic eval. including insertion & repositioning of multiple electrode catheters with induction or attempted induction of arrhythmias with left atrial pacing & recordingfrom coronary sinus or left atrium  07/10/2018    comprehensive  electrophysiologic eval. insertion & repositioning multiple electrode catheters with induction or attempted induction of an arrhythmia with right atrial pacing and recording, right ventricular pacing and recording  07/10/2018    Destruction of Conduction Mechanism, Percutaneous Approach  07/10/2018     Time Tracking     PT Received On: 06/12/24  PT Start Time: 1025     PT Stop Time: 1037  PT Total Time (min): 12 min     Billable Minutes: Evaluation , low complexity    06/12/2024

## 2024-06-12 NOTE — PLAN OF CARE
Problem: Adult Inpatient Plan of Care  Goal: Plan of Care Review  Outcome: Progressing  Flowsheets (Taken 6/12/2024 1652)  Plan of Care Reviewed With:   patient   spouse  Goal: Absence of Hospital-Acquired Illness or Injury  Outcome: Progressing  Intervention: Identify and Manage Fall Risk  Flowsheets (Taken 6/12/2024 1652)  Safety Promotion/Fall Prevention:   assistive device/personal item within reach   medications reviewed   side rails raised x 2   lighting adjusted   nonskid shoes/socks when out of bed   instructed to call staff for mobility  Intervention: Prevent Skin Injury  Flowsheets (Taken 6/12/2024 1652)  Body Position: position changed independently  Device Skin Pressure Protection: tubing/devices free from skin contact  Intervention: Prevent and Manage VTE (Venous Thromboembolism) Risk  Flowsheets (Taken 6/12/2024 1652)  VTE Prevention/Management:   remove, assess skin, and reapply sequential compression device   bleeding risk assessed   ambulation promoted  Intervention: Prevent Infection  Flowsheets (Taken 6/12/2024 1652)  Infection Prevention:   rest/sleep promoted   hand hygiene promoted

## 2024-06-12 NOTE — PLAN OF CARE
06/12/24 1045   Discharge Assessment   Assessment Type Discharge Planning Assessment   Confirmed/corrected address, phone number and insurance Yes   Confirmed Demographics Correct on Facesheet   Source of Information patient   When was your last doctors appointment?   (mina Pickens)   Reason For Admission SOB, weakness, CP, HTN, CHF   People in Home alone   Facility Arrived From: home   Do you expect to return to your current living situation? Yes   Do you have help at home or someone to help you manage your care at home? Yes   Who are your caregiver(s) and their phone number(s)? S.O. Ladonna Gonzales [phone]   Prior to hospitilization cognitive status: Alert/Oriented   Current cognitive status: Alert/Oriented   Walking or Climbing Stairs Difficulty no   Dressing/Bathing Difficulty no   Home Accessibility wheelchair accessible   Equipment Currently Used at Home none   Readmission within 30 days? No   Patient currently being followed by outpatient case management? No   Do you currently have service(s) that help you manage your care at home? Yes   Name and Contact number of agency  [facility]   Is the pt/caregiver preference to resume services with current agency No  (Would like a differnt agency)   Do you take prescription medications? Yes   Do you have prescription coverage? Yes   Coverage [employer]   Do you have any problems affording any of your prescribed medications? No   Is the patient taking medications as prescribed? yes   Who is going to help you get home at discharge? family   How do you get to doctors appointments? car, drives self   Are you on dialysis? No   Do you take coumadin? No   Discharge Plan A Home Health   DME Needed Upon Discharge  none   Discharge Plan discussed with: Spouse/sig other;Patient   Name(s) and Number(s) Ladonna Gonzales [phone]   Transition of Care Barriers None   Physical Activity   On average, how many days per week do you engage in moderate to strenuous exercise (like a  brisk walk)? 2 days   On average, how many minutes do you engage in exercise at this level? 10 min   Financial Resource Strain   How hard is it for you to pay for the very basics like food, housing, medical care, and heating? Not very   Housing Stability   In the last 12 months, was there a time when you were not able to pay the mortgage or rent on time? N   At any time in the past 12 months, were you homeless or living in a shelter (including now)? N   Transportation Needs   Has the lack of transportation kept you from medical appointments, meetings, work or from getting things needed for daily living? No   Food Insecurity   Within the past 12 months, you worried that your food would run out before you got the money to buy more. Never true   Within the past 12 months, the food you bought just didn't last and you didn't have money to get more. Never true   Stress   Do you feel stress - tense, restless, nervous, or anxious, or unable to sleep at night because your mind is troubled all the time - these days? Only a littl   Social Isolation   How often do you feel lonely or isolated from those around you?  Never   Alcohol Use   Q1: How often do you have a drink containing alcohol? Monthly or l   Q2: How many drinks containing alcohol do you have on a typical day when you are drinking? 1 or 2   Q3: How often do you have six or more drinks on one occasion? Never   Utilities   In the past 12 months has the electric, gas, oil, or water company threatened to shut off services in your home? No   Health Literacy   How often do you need to have someone help you when you read instructions, pamphlets, or other written material from your doctor or pharmacy? Never

## 2024-06-12 NOTE — PROGRESS NOTES
[facility] Medicine Wards Progress Note     Resident Team: [facility] Medicine List 3  Attending Physician: Demetrius Arrington MD  Resident: Vlad Mccormick      Date of Admit: 6/11/2024    Chief Complaint     Weakness and Shortness of Breath (C/o weakness, sob. Sent from cardiology clinic for a fib)       Subjective:      History of Present Illness:  Jamey Duke is a 63 y.o.  male who with a history of  PMH of CHF (EF 40% in 1/2023), CKD, Afib on Xarelto with failed DCCV and ablation, and SHADI on CPAP, left-sided loculated pleural effusion . The patient presented to [facility] ED   on 6/11/2024  with a primary complaint of Weakness and Shortness of Breath (C/o weakness, sob. Sent from cardiology clinic for a fib)    Patient reports for the past month he was getting more more short of breath.  He reports he was unable to walk more than 10-15 feet.  Patient was reports after his previous admission for heart failure exacerbation he was discharged with home health.  He was noticed he is gained about 7 lb in the last 2 weeks.  He was attempted to call home health in an attempt to adjust his medication but was unable to reach them.  Patient also endorses he drinks up to 8 bottles of 8 fluid oz of water a day.  This morning he was seen in cardiology clinic for routine follow up.  Due to severity of symptoms and debilitation with shortness of breath patient was asked to go to the emergency department and be evaluated.     Per chart review it was noted in the cardiology note from earlier this morning the patient was complaining of atypical chest pain and orthopnea.  This was not endorsed by the patient on my interview.     Patient has a extensive cardiology workup previously.  He was most recent echo was completed on 04/26/2024 which showed an ejection fraction of 40%.  It was indeterminate patient was diastolic dysfunction due to poor acoustic windows.  There was moderate global hypokinesis present.  Patient was also had a cardiac  catheterization 07/29/2022 that revealed normal coronary arteries.    In the emergency department laboratory workup was notable for a white count of 12.47, bicarb 20, BUN of 32.6 and a creatinine of 2.08.  BNP was markedly elevated in the setting of CKD at 2646.  Chest x-ray notable for worsening of loculated pleural effusion on left side.  Cardiomegaly also noted on chest x-ray.  EKGs unremarkable.    Patient was denies any recent syncopal episodes, palpitations, or chest pain.    Interval History:  NAEON.  Patient tolerating CPAP well.  Reports improvement in shortness of breath this morning, able to walk with physical therapy without significant distress.  Denies any acute complaints. Reports no chest pain, LE swelling, palpitations, abdominal pain, N/V, diarrhea, constipation.  Remains tachycardic between 100-120 in atrial fibrillation on cardiac monitor.  Otherwise hemodynamically stable. Approx 1.1L urine output over last 24hrs despite 80mg lasix in the ED.  Will continue to diurese as tolerated.  Cardiology consulted, appreciate recommendations.       Past Medical History:  Past Medical History:   Diagnosis Date    Atrial flutter     Diabetes mellitus     Hypercholesteremia     Hypertension     Morbid obesity     Obese     Tobacco user        Past Surgical History:  Past Surgical History:   Procedure Laterality Date    ANGIOGRAM, CORONARY, WITH LEFT HEART CATHETERIZATION N/A 07/29/2022    Procedure: Angiogram, Coronary, with Left Heart Cath;  Surgeon: Yossi Zuniga MD;  Location: [facility] CATH LAB;  Service: Cardiology;  Laterality: N/A;    Cardioversion x 2      COLONOSCOPY  10/24/2023    Comprehensive electrophysiologic eval. including insertion & repositioning of multiple electrode catheters with induction or attempted induction of arrhythmias with left atrial pacing & recordingfrom coronary sinus or left atrium  07/10/2018    comprehensive electrophysiologic eval. insertion & repositioning multiple  electrode catheters with induction or attempted induction of an arrhythmia with right atrial pacing and recording, right ventricular pacing and recording  07/10/2018    Destruction of Conduction Mechanism, Percutaneous Approach  07/10/2018       Family History:  Family History   Problem Relation Name Age of Onset    Hypertension Mother      Valvular heart disease Father         Social History:  Social History     Tobacco Use    Smoking status: Some Days     Types: Cigars     Passive exposure: Current    Smokeless tobacco: Never    Tobacco comments:     Occasionally, last smoked 4/20/24   Substance Use Topics    Alcohol use: Yes     Alcohol/week: 6.0 standard drinks of alcohol     Types: 6 Cans of beer per week     Comment: Drinks occasionally beer on weekends    Drug use: Never       Allergies:  Review of patient's allergies indicates:  No Known Allergies    Home Medications:  Prior to Admission medications    Medication Sig Start Date End Date Taking? Authorizing Provider   blood sugar diagnostic Strp Inject 1 strip into the skin 2 (two) times a day. 5/12/22   Geetha Pickens FNP   blood-glucose meter kit   True Metrix Glucometer, See Instructions, Use to check blood glucose daily, # 1 EA, 0 Refill(s), Pharmacy: [facility], 177.8, cm, Height/Length Dosing, 01/12/22 9:55:00 CST, 148.4, kg, Weight Dosing, 01/12/22 9:55:00 CST 1/14/22   Provider, Historical   cetirizine (ZYRTEC) 10 MG tablet Take 1 tablet (10 mg total) by mouth nightly as needed for Allergies or Rhinitis. 1/26/24   Geetha Pickens FNP   dapagliflozin propanediol (FARXIGA) 5 mg Tab tablet Take 5 mg by mouth once daily.    Provider, Historical   EASY TOUCH TWIST LANCETS 33 gauge Misc USE AS DIRECTED TO CHECK BLOOD GLUCOSE DAILY 1/14/22   Provider, Historical   empagliflozin (JARDIANCE) 10 mg tablet Take 1 tablet (10 mg total) by mouth once daily. 9/15/23 9/14/24  Christin Hartman FNP   famotidine (PEPCID) 20 MG tablet Take  20 mg by mouth 2 (two) times daily.    Provider, Historical   fluticasone propionate (FLONASE) 50 mcg/actuation nasal spray 1 spray (50 mcg total) by Each Nostril route once daily. 24   Geetha Pickens FNP   furosemide (LASIX) 40 MG tablet Take 1 tablet (40 mg total) by mouth 2 (two) times daily. 24  DevanChristin apple, FNP   hydrALAZINE (APRESOLINE) 50 MG tablet Take 1 tablet (50 mg total) by mouth 2 (two) times daily. 24  DevanChristin apple FNP   LANCETS MISC   True Metrix Lancets, See Instructions, Use to check blood glucose daily, # 100 EA, 11 Refill(s), Pharmacy: [facility], 177.8, cm, Height/Length Dosing, 22 9:55:00 CST, 148.4, kg, Weight Dosing, 22 9:55:00 CST 22   Provider, Historical   metoprolol succinate (TOPROL-XL) 100 MG 24 hr tablet Take 1 tablet (100 mg total) by mouth once daily. 24  Rachel Chance,    potassium chloride SA (KLOR-CON M20) 20 MEQ tablet Take 1 tablet (20 mEq total) by mouth 2 (two) times daily. 24   Christin Hartman, FNP   pravastatin (PRAVACHOL) 40 MG tablet Take 1 tablet (40 mg total) by mouth every evening. 24  Santino Hartmanica L, FNP   sacubitriL-valsartan (ENTRESTO) 49-51 mg per tablet Take 1 tablet by mouth 2 (two) times daily. 12/11/23 12/10/24  Christin Hartman, FNP   tadalafiL (CIALIS) 20 MG Tab Take 20 mg by mouth daily as needed.  Patient not taking: Reported on 2024   Provider, Historical   testosterone cypionate (DEPOTESTOTERONE CYPIONATE) 200 mg/mL injection Inject 200 mg into the muscle every 14 (fourteen) days. 5/15/24   Provider, Historical   XARELTO 20 mg Tab Take 1 tablet (20 mg total) by mouth once daily. 9/15/23 9/14/24  Christin Hartman, ULYSSESP         Review of Systems:  Negative for all symptoms other than those listed in HPI           Objective:   Last 24 Hour Vital Signs:  BP  Min: 115/74  Max: 139/90  Temp  Av.7 °F (36.5 °C)  Min: 97.4  "°F (36.3 °C)  Max: 98 °F (36.7 °C)  Pulse  Av.7  Min: 100  Max: 120  Resp  Av.1  Min: 18  Max: 24  SpO2  Av.4 %  Min: 92 %  Max: 98 %  Height  Av' 10" (177.8 cm)  Min: 5' 10" (177.8 cm)  Max: 5' 10" (177.8 cm)  Weight  Av.4 kg (307 lb 5.7 oz)  Min: 139.1 kg (306 lb 10.6 oz)  Max: 139.6 kg (307 lb 11.2 oz)  Body mass index is 44 kg/m².  I/O last 3 completed shifts:  In: 240 [P.O.:240]  Out: 1200 [Urine:1200]    Physical Examination:  Physical Exam  Constitutional:       Appearance: He is obese.   HENT:      Head: Normocephalic and atraumatic.   Eyes:      Extraocular Movements: Extraocular movements intact.      Conjunctiva/sclera: Conjunctivae normal.      Pupils: Pupils are equal, round, and reactive to light.   Neck:      Vascular: No carotid bruit.   Cardiovascular:      Rate and Rhythm: Tachycardia present. Rhythm irregular.      Heart sounds: Normal heart sounds. No murmur heard.     No friction rub. No gallop.      Comments: Distant heart sounds  Pulmonary:      Effort: No respiratory distress.      Breath sounds: No wheezing or rales.      Comments: Difficult to auscultate  Abdominal:      General: Bowel sounds are normal.      Palpations: Abdomen is soft.   Musculoskeletal:      Cervical back: No rigidity or tenderness.      Right lower leg: No edema.      Left lower leg: No edema.   Lymphadenopathy:      Cervical: No cervical adenopathy.   Skin:     Capillary Refill: Capillary refill takes less than 2 seconds.   Neurological:      General: No focal deficit present.      Mental Status: He is alert and oriented to person, place, and time.           Laboratory:  Most Recent Data:  CBC:   Lab Results   Component Value Date    WBC 12.51 (H) 2024    HGB 13.3 (L) 2024    HCT 40.4 (L) 2024     2024    MCV 93.7 2024    RDW 17.6 (H) 2024     WBC Differential:   Recent Labs   Lab 24  1042 24  0411   WBC 12.47* 12.51*   HGB 13.7* 13.3* " "  HCT 41.5* 40.4*    244   MCV 92.8 93.7     BMP:   Lab Results   Component Value Date     06/12/2024    K 3.8 06/12/2024     06/12/2024    CO2 22 (L) 06/12/2024    BUN 32.4 (H) 06/12/2024    CREATININE 2.11 (H) 06/12/2024    CALCIUM 9.2 06/12/2024    MG 2.20 06/12/2024    PHOS 4.3 06/12/2024     LFTs:   Lab Results   Component Value Date    ALBUMIN 3.1 (L) 06/12/2024    BILITOT 1.0 06/12/2024    AST 32 06/12/2024    ALKPHOS 94 06/12/2024    ALT 45 06/12/2024     Coags:   Lab Results   Component Value Date    INR 1.6 (H) 07/26/2022     FLP:   Lab Results   Component Value Date    CHOL 165 07/26/2023    HDL 39 07/26/2023    TRIG 131 07/26/2023     DM:   Lab Results   Component Value Date    HGBA1C 5.5 04/30/2024    HGBA1C 5.8 07/26/2023    HGBA1C 5.9 01/26/2023    CREATININE 2.11 (H) 06/12/2024     Thyroid:   Lab Results   Component Value Date    TSH 2.1907 05/12/2022      Anemia: No results found for: "IRON", "TIBC", "FERRITIN", "SATURATEDIRO"  No results found for: "TWBREOCP29"  No results found for: "FOLATE"     Cardiac:   Lab Results   Component Value Date    TROPONINI 0.028 06/11/2024    BNP 2,646.1 (H) 06/11/2024     Urinalysis:   Lab Results   Component Value Date    COLORU Colorless (A) 11/03/2023    NITRITE Negative 11/03/2023    KETONESU Negative 01/12/2022    UROBILINOGEN Normal 11/03/2023    WBCUA 0-5 11/03/2023       Trended Lab Data:  Recent Labs   Lab 06/11/24  1042 06/12/24  0411   WBC 12.47* 12.51*   HGB 13.7* 13.3*   HCT 41.5* 40.4*    244   MCV 92.8 93.7   RDW 17.7* 17.6*    142   K 4.2 3.8   * 107   CO2 20* 22*   BUN 32.6* 32.4*   CREATININE 2.08* 2.11*   ALBUMIN 3.4 3.1*   BILITOT 1.2 1.0   AST 39* 32   ALKPHOS 105 94   ALT 48 45       Trended Cardiac Data:  Recent Labs   Lab 06/11/24  1042   TROPONINI 0.028   BNP 2,646.1*       Microbiology Data:  Microbiology Results (last 7 days)       ** No results found for the last 168 hours. **             Other " Results:    Radiology:  Imaging Results              CT Chest Without Contrast (Final result)  Result time 06/11/24 17:26:31      Final result by Geeta Campos MD (06/11/24 17:26:31)                   Impression:      1. No significant interval change compared to 04/24/2024.  2. Mediastinal lymphadenopathy and loculated bilateral pleural effusions      Electronically signed by: Geeta Campos  Date:    06/11/2024  Time:    17:26               Narrative:    EXAMINATION:  CT CHEST WITHOUT CONTRAST    CLINICAL HISTORY:  Pleural effusion, malignancy suspected;    TECHNIQUE:  CT imaging of the chest without IV contrast. Axial, coronal and sagittal reformatted images are reviewed. Dose length product is 528 mGycm. Automatic exposure control, adjustment of mA/kV or iterative reconstruction technique was used to limit radiation dose.    COMPARISON:  CT chest dated 04/24/2024    FINDINGS:  Lungs and large airways: No focal pulmonary nodule.  Mild septal thickening in the lung bases.    Pleura: Similar small bilateral loculated pleural effusions, right greater than left.    Heart and vasculature: The heart is stable in size.  There is no pericardial effusion.  There are scattered coronary calcifications.    Mediastinum and ashley: Similar enlarged mediastinal lymph nodes.  For example a right paratracheal lymph node measures 1.8 cm in short axis (series 6, image 31).    Chest wall/axilla and lower neck: No significant findings.    Upper abdomen: No significant findings.    Bones: No acute osseous findings.                                       X-Ray Chest 1 View (Final result)  Result time 06/11/24 12:55:40      Final result by Davis Cat MD (06/11/24 12:55:40)                   Impression:      Mild cardiomegaly with generalized pulmonary vascular prominence.      Electronically signed by: Davis Cat  Date:    06/11/2024  Time:    12:55               Narrative:    EXAMINATION:  XR CHEST 1 VIEW    CLINICAL  HISTORY:  afib;    TECHNIQUE:  Frontal view(s) of the chest.    COMPARISON:  Radiography 04/30/2024    FINDINGS:  No significant change in left lower lung pleuroparenchymal opacity since April.  An area of fluid along the right minor fissure also has similar appearance.  Generalized pulmonary vascular prominence with no defined acute consolidation.  No pneumothorax.  Mildly enlarged cardiac silhouette.                                           Assessment & Plan:     CHF exacerbation  - EF 40% on 1/2024, moderate KIRSTEN and LAE, mild MR, mild to mod Pulm HTN  - Reports compliance with GDMT and Lasix 40mg BID  - No increased salt and alcohol intake over the weekend but does endorse increase intake of water although states he spends a significant amount of time working outside in the heat.   - Lasix 80 IV x 1 in ED with approx 1.2L urine output over last 24hrs.  Will continue lasix 40mg BID for now and consider switching to torsemide prior to discharge.   - Continue Entresto 24-26 b.i.d., Increasing Toprol to 100mg daily with 50mg qhs, Lipitor 80 q.d. Will titrate as tolerated to maximize benefits.   - Strict intake/output  - Monitor lytes and renal function  - cardiology consulted      Left loculated pleural effusion  - left loculated pleural effusion chronic  - CT chest showing stable effusion.  Will need further outpatient workup with pulmonology, appt scheduled in December      AF RVR  - EKG: AFib  - Xarelto 20 mg daily  - Continuous cardiac monitoring  - adjusting metoprolol per cardiology recommendations.      LUIS EDUARDO on CKD  NAGMA  - Baseline Cr 1.29, Admission Cr 2.08  - Continue diuresis and repeat CMP in the morning  - avoid nephrotoxic medications      T2DM  - Well controlled, A1c 5.9 in 1/2024  - Low dose SSI     HTN  HLD  -continue hydralazine 50mg BID   -continuing atorvastatin 80 mg q.d.  -other medications as above.      SHADI on CPAP  - Resume CPAP QHS    CODE STATUS:  Full code  Access:  PIV  Antibiotics:   None  Diet:  Heart healthy  DVT Prophylaxis:  Lovenox  GI Prophylaxis:  None  Fluids:  None    Disposition:  Discharge to home after resolution of CHF exacerbation/ sob    Maru Lynn MD  [facility] PGY-2  06/12/2024

## 2024-06-13 LAB
ALBUMIN SERPL-MCNC: 3 G/DL (ref 3.4–4.8)
ALBUMIN/GLOB SERPL: 0.7 RATIO (ref 1.1–2)
ALP SERPL-CCNC: 100 UNIT/L (ref 40–150)
ALT SERPL-CCNC: 36 UNIT/L (ref 0–55)
ANION GAP SERPL CALC-SCNC: 12 MEQ/L
AST SERPL-CCNC: 31 UNIT/L (ref 5–34)
BASOPHILS # BLD AUTO: 0.06 X10(3)/MCL
BASOPHILS NFR BLD AUTO: 0.5 %
BILIRUB SERPL-MCNC: 0.8 MG/DL
BUN SERPL-MCNC: 26.9 MG/DL (ref 8.4–25.7)
CALCIUM SERPL-MCNC: 8.9 MG/DL (ref 8.8–10)
CHLORIDE SERPL-SCNC: 106 MMOL/L (ref 98–107)
CO2 SERPL-SCNC: 21 MMOL/L (ref 23–31)
CREAT SERPL-MCNC: 2.03 MG/DL (ref 0.73–1.18)
CREAT/UREA NIT SERPL: 13
EOSINOPHIL # BLD AUTO: 0.25 X10(3)/MCL (ref 0–0.9)
EOSINOPHIL NFR BLD AUTO: 1.9 %
ERYTHROCYTE [DISTWIDTH] IN BLOOD BY AUTOMATED COUNT: 17.7 % (ref 11.5–17)
GFR SERPLBLD CREATININE-BSD FMLA CKD-EPI: 36 ML/MIN/1.73/M2
GLOBULIN SER-MCNC: 4.1 GM/DL (ref 2.4–3.5)
GLUCOSE SERPL-MCNC: 85 MG/DL (ref 82–115)
HCT VFR BLD AUTO: 41 % (ref 42–52)
HGB BLD-MCNC: 13.2 G/DL (ref 14–18)
IMM GRANULOCYTES # BLD AUTO: 0.08 X10(3)/MCL (ref 0–0.04)
IMM GRANULOCYTES NFR BLD AUTO: 0.6 %
LYMPHOCYTES # BLD AUTO: 2.3 X10(3)/MCL (ref 0.6–4.6)
LYMPHOCYTES NFR BLD AUTO: 17.7 %
MAGNESIUM SERPL-MCNC: 2.3 MG/DL (ref 1.6–2.6)
MCH RBC QN AUTO: 30.6 PG (ref 27–31)
MCHC RBC AUTO-ENTMCNC: 32.2 G/DL (ref 33–36)
MCV RBC AUTO: 94.9 FL (ref 80–94)
MONOCYTES # BLD AUTO: 0.92 X10(3)/MCL (ref 0.1–1.3)
MONOCYTES NFR BLD AUTO: 7.1 %
NEUTROPHILS # BLD AUTO: 9.39 X10(3)/MCL (ref 2.1–9.2)
NEUTROPHILS NFR BLD AUTO: 72.2 %
NRBC BLD AUTO-RTO: 0.2 %
PHOSPHATE SERPL-MCNC: 3.9 MG/DL (ref 2.3–4.7)
PLATELET # BLD AUTO: 249 X10(3)/MCL (ref 130–400)
PMV BLD AUTO: 12.7 FL (ref 7.4–10.4)
POCT GLUCOSE: 110 MG/DL (ref 70–110)
POCT GLUCOSE: 113 MG/DL (ref 70–110)
POCT GLUCOSE: 137 MG/DL (ref 70–110)
POCT GLUCOSE: 150 MG/DL (ref 70–110)
POTASSIUM SERPL-SCNC: 4.2 MMOL/L (ref 3.5–5.1)
PROT SERPL-MCNC: 7.1 GM/DL (ref 5.8–7.6)
RBC # BLD AUTO: 4.32 X10(6)/MCL (ref 4.7–6.1)
SODIUM SERPL-SCNC: 139 MMOL/L (ref 136–145)
WBC # SPEC AUTO: 13 X10(3)/MCL (ref 4.5–11.5)

## 2024-06-13 PROCEDURE — 25000003 PHARM REV CODE 250

## 2024-06-13 PROCEDURE — 85025 COMPLETE CBC W/AUTO DIFF WBC: CPT

## 2024-06-13 PROCEDURE — 21400001 HC TELEMETRY ROOM

## 2024-06-13 PROCEDURE — 63600175 PHARM REV CODE 636 W HCPCS

## 2024-06-13 PROCEDURE — 80053 COMPREHEN METABOLIC PANEL: CPT

## 2024-06-13 PROCEDURE — 99900035 HC TECH TIME PER 15 MIN (STAT)

## 2024-06-13 PROCEDURE — 27100171 HC OXYGEN HIGH FLOW UP TO 24 HOURS

## 2024-06-13 PROCEDURE — 25000242 PHARM REV CODE 250 ALT 637 W/ HCPCS

## 2024-06-13 PROCEDURE — 36415 COLL VENOUS BLD VENIPUNCTURE: CPT

## 2024-06-13 PROCEDURE — 83735 ASSAY OF MAGNESIUM: CPT

## 2024-06-13 PROCEDURE — 84100 ASSAY OF PHOSPHORUS: CPT

## 2024-06-13 PROCEDURE — 94660 CPAP INITIATION&MGMT: CPT

## 2024-06-13 PROCEDURE — 94761 N-INVAS EAR/PLS OXIMETRY MLT: CPT

## 2024-06-13 RX ORDER — FUROSEMIDE 10 MG/ML
60 INJECTION INTRAMUSCULAR; INTRAVENOUS EVERY 12 HOURS
Status: DISCONTINUED | OUTPATIENT
Start: 2024-06-13 | End: 2024-06-14 | Stop reason: HOSPADM

## 2024-06-13 RX ORDER — METOPROLOL SUCCINATE 50 MG/1
50 TABLET, EXTENDED RELEASE ORAL NIGHTLY
Status: DISCONTINUED | OUTPATIENT
Start: 2024-06-13 | End: 2024-06-14 | Stop reason: HOSPADM

## 2024-06-13 RX ADMIN — METOPROLOL SUCCINATE 100 MG: 50 TABLET, EXTENDED RELEASE ORAL at 08:06

## 2024-06-13 RX ADMIN — SACUBITRIL AND VALSARTAN 2 TABLET: 24; 26 TABLET, FILM COATED ORAL at 08:06

## 2024-06-13 RX ADMIN — FUROSEMIDE 60 MG: 10 INJECTION, SOLUTION INTRAMUSCULAR; INTRAVENOUS at 09:06

## 2024-06-13 RX ADMIN — CETIRIZINE HYDROCHLORIDE 5 MG: 5 TABLET ORAL at 08:06

## 2024-06-13 RX ADMIN — FAMOTIDINE 20 MG: 20 TABLET, FILM COATED ORAL at 08:06

## 2024-06-13 RX ADMIN — ATORVASTATIN CALCIUM 80 MG: 40 TABLET, FILM COATED ORAL at 08:06

## 2024-06-13 RX ADMIN — HYDRALAZINE HYDROCHLORIDE 50 MG: 50 TABLET ORAL at 08:06

## 2024-06-13 RX ADMIN — EMPAGLIFLOZIN 10 MG: 10 TABLET, FILM COATED ORAL at 08:06

## 2024-06-13 RX ADMIN — METOPROLOL SUCCINATE 50 MG: 50 TABLET, EXTENDED RELEASE ORAL at 08:06

## 2024-06-13 RX ADMIN — RIVAROXABAN 20 MG: 10 TABLET, FILM COATED ORAL at 08:06

## 2024-06-13 NOTE — PLAN OF CARE
Problem: Adult Inpatient Plan of Care  Goal: Plan of Care Review  Outcome: Progressing  Flowsheets (Taken 6/13/2024 1742)  Plan of Care Reviewed With:   patient   spouse  Goal: Absence of Hospital-Acquired Illness or Injury  Outcome: Progressing  Intervention: Identify and Manage Fall Risk  Flowsheets (Taken 6/13/2024 1742)  Safety Promotion/Fall Prevention:   assistive device/personal item within reach   Fall Risk signage in place   nonskid shoes/socks when out of bed   high risk medications identified   medications reviewed   lighting adjusted   side rails raised x 2   instructed to call staff for mobility  Intervention: Prevent Skin Injury  Flowsheets (Taken 6/13/2024 1742)  Body Position: position changed independently  Device Skin Pressure Protection: tubing/devices free from skin contact  Intervention: Prevent and Manage VTE (Venous Thromboembolism) Risk  Flowsheets (Taken 6/13/2024 1742)  VTE Prevention/Management:   remove, assess skin, and reapply sequential compression device   ambulation promoted   bleeding precations maintained  Intervention: Prevent Infection  Flowsheets (Taken 6/13/2024 1742)  Infection Prevention:   hand hygiene promoted   rest/sleep promoted

## 2024-06-13 NOTE — PLAN OF CARE
Problem: Adult Inpatient Plan of Care  Goal: Plan of Care Review  6/13/2024 0644 by Alexandria Boo LPN  Outcome: Progressing  6/12/2024 2218 by Alexandria Boo LPN  Outcome: Progressing  Goal: Patient-Specific Goal (Individualized)  6/13/2024 0644 by Alexandria Boo LPN  Outcome: Progressing  6/12/2024 2218 by Alexandria Boo LPN  Outcome: Progressing  Goal: Absence of Hospital-Acquired Illness or Injury  6/13/2024 0644 by Alexandria Boo LPN  Outcome: Progressing  6/12/2024 2218 by Alexandria Boo LPN  Outcome: Progressing  Goal: Optimal Comfort and Wellbeing  6/13/2024 0644 by Alexandria Boo LPN  Outcome: Progressing  6/12/2024 2218 by Alexandria Boo LPN  Outcome: Progressing  Goal: Readiness for Transition of Care  6/13/2024 0644 by Alexandria Boo LPN  Outcome: Progressing  6/12/2024 2218 by Alexandria Boo LPN  Outcome: Progressing     Problem: Bariatric Environmental Safety  Goal: Safety Maintained with Care  6/13/2024 0644 by Alexandria Boo LPN  Outcome: Progressing  6/12/2024 2218 by Alexandria Boo LPN  Outcome: Progressing     Problem: Diabetes Comorbidity  Goal: Blood Glucose Level Within Targeted Range  6/13/2024 0644 by Alexandria Boo LPN  Outcome: Progressing  6/12/2024 2218 by Alexandria Boo LPN  Outcome: Progressing     Problem: Acute Kidney Injury/Impairment  Goal: Fluid and Electrolyte Balance  6/13/2024 0644 by Alexandria Boo LPN  Outcome: Progressing  6/12/2024 2218 by Alexandria Boo LPN  Outcome: Progressing  Goal: Improved Oral Intake  6/13/2024 0644 by Alexandria Boo LPN  Outcome: Progressing  6/12/2024 2218 by Alexandria Boo LPN  Outcome: Progressing  Goal: Effective Renal Function  6/13/2024 0644 by Alexandria Boo LPN  Outcome: Progressing  6/12/2024 2218 by Alexandria Boo LPN  Outcome: Progressing

## 2024-06-13 NOTE — PROGRESS NOTES
[facility] Wards Progress Note     Resident Team: [facility] Medicine List 3  Attending Physician: Demetrius Arrington MD  Resident: Vlad Mccormick      Date of Admit: 6/11/2024    Chief Complaint     Weakness and Shortness of Breath (C/o weakness, sob. Sent from cardiology clinic for a fib)       Subjective:      History of Present Illness:  Jamey Duke is a 63 y.o.  male who with a history of  PMH of CHF (EF 40% in 1/2023), CKD, Afib on Xarelto with failed DCCV and ablation, and SHADI on CPAP, left-sided loculated pleural effusion . The patient presented to [facility] ED   on 6/11/2024  with a primary complaint of Weakness and Shortness of Breath (C/o weakness, sob. Sent from cardiology clinic for a fib)    Patient reports for the past month he was getting more more short of breath.  He reports he was unable to walk more than 10-15 feet.  Patient  reports after his previous admission for heart failure exacerbation he was discharged with home health.  He has noticed he is gained about 7 lb in the last 2 weeks.  He has attempted to call home health in order to adjust his medication but was unable to reach them.  Patient also endorses he drinks up to 8 bottles of 8 fluid oz of water a day.  This morning he was seen in cardiology clinic for routine follow up.  Due to severity of symptoms and debilitation with shortness of breath patient was asked to go to the emergency department and be evaluated.     Per chart review it was noted in the cardiology note from earlier this morning the patient was complaining of atypical chest pain and orthopnea.  This was not endorsed by the patient on my interview.     Patient has had extensive cardiology workup previously.  His  most recent echo was completed on 04/26/2024 which showed an ejection fraction of 40%.  It was indeterminate on the matter of diastolic dysfunction due to poor acoustic windows.  There was moderate global hypokinesis present.  Patient has also had a cardiac  catheterization 07/29/2022 that revealed normal coronary arteries.    In the emergency department laboratory workup was notable for a white count of 12.47, bicarb 20, BUN of 32.6 and a creatinine of 2.08.  BNP was markedly elevated in the setting of CKD at 2646.  Chest x-ray notable for worsening of loculated pleural effusion on left side.  Cardiomegaly also noted on chest x-ray.  EKGs unremarkable.    Patient was denies any recent syncopal episodes, palpitations, or chest pain.    Interval History:  NAEON.  No acute events overnight.  Patient reports he feels much better since admission.  Previously he was reporting shortness of breath with walking 10 ft.  Currently he was able to walk approximately 50 ft before he starts having episodes of shortness of breath.  He is currently net negative at 2500 cc.  Vitals stable with blood pressure ranging between 119 to 135.  Laboratory workup shows an improvement in creatinine.  We will increase his Lasix dose to 60 b.i.d. from 40 b.i.d. patient compliant with CPAP and reports improvement in symptoms.      Past Medical History:  Past Medical History:   Diagnosis Date    Atrial flutter     Diabetes mellitus     Hypercholesteremia     Hypertension     Morbid obesity     Obese     Tobacco user        Past Surgical History:  Past Surgical History:   Procedure Laterality Date    ANGIOGRAM, CORONARY, WITH LEFT HEART CATHETERIZATION N/A 07/29/2022    Procedure: Angiogram, Coronary, with Left Heart Cath;  Surgeon: Yossi Zuniga MD;  Location: [facility] CATH LAB;  Service: Cardiology;  Laterality: N/A;    Cardioversion x 2      COLONOSCOPY  10/24/2023    Comprehensive electrophysiologic eval. including insertion & repositioning of multiple electrode catheters with induction or attempted induction of arrhythmias with left atrial pacing & recordingfrom coronary sinus or left atrium  07/10/2018    comprehensive electrophysiologic eval. insertion & repositioning multiple electrode  catheters with induction or attempted induction of an arrhythmia with right atrial pacing and recording, right ventricular pacing and recording  07/10/2018    Destruction of Conduction Mechanism, Percutaneous Approach  07/10/2018       Family History:  Family History   Problem Relation Name Age of Onset    Hypertension Mother      Valvular heart disease Father         Social History:  Social History     Tobacco Use    Smoking status: Some Days     Types: Cigars     Passive exposure: Current    Smokeless tobacco: Never    Tobacco comments:     Occasionally, last smoked 4/20/24   Substance Use Topics    Alcohol use: Yes     Alcohol/week: 6.0 standard drinks of alcohol     Types: 6 Cans of beer per week     Comment: Drinks occasionally beer on weekends    Drug use: Never       Allergies:  Review of patient's allergies indicates:  No Known Allergies    Home Medications:  Prior to Admission medications    Medication Sig Start Date End Date Taking? Authorizing Provider   blood sugar diagnostic Strp Inject 1 strip into the skin 2 (two) times a day. 5/12/22   Geetha Pickens FNP   blood-glucose meter kit   True Metrix Glucometer, See Instructions, Use to check blood glucose daily, # 1 EA, 0 Refill(s), Pharmacy: [facility], 177.8, cm, Height/Length Dosing, 01/12/22 9:55:00 CST, 148.4, kg, Weight Dosing, 01/12/22 9:55:00 CST 1/14/22   Provider, Historical   cetirizine (ZYRTEC) 10 MG tablet Take 1 tablet (10 mg total) by mouth nightly as needed for Allergies or Rhinitis. 1/26/24   Geetha Pickens FNP   dapagliflozin propanediol (FARXIGA) 5 mg Tab tablet Take 5 mg by mouth once daily.    Provider, Historical   EASY TOUCH TWIST LANCETS 33 gauge Misc USE AS DIRECTED TO CHECK BLOOD GLUCOSE DAILY 1/14/22   Provider, Historical   empagliflozin (JARDIANCE) 10 mg tablet Take 1 tablet (10 mg total) by mouth once daily. 9/15/23 9/14/24  Christin Hartman FNP   famotidine (PEPCID) 20 MG tablet Take 20 mg by  mouth 2 (two) times daily.    Provider, Historical   fluticasone propionate (FLONASE) 50 mcg/actuation nasal spray 1 spray (50 mcg total) by Each Nostril route once daily. 24   Geetha Pickens FNP   furosemide (LASIX) 40 MG tablet Take 1 tablet (40 mg total) by mouth 2 (two) times daily. 24  DevanChristin godinez, FNP   hydrALAZINE (APRESOLINE) 50 MG tablet Take 1 tablet (50 mg total) by mouth 2 (two) times daily. 24  DevanSantino godinezica L, FNP   LANCETS MISC   True Metrix Lancets, See Instructions, Use to check blood glucose daily, # 100 EA, 11 Refill(s), Pharmacy: [facility], 177.8, cm, Height/Length Dosing, 22 9:55:00 CST, 148.4, kg, Weight Dosing, 22 9:55:00 CST 22   Provider, Historical   metoprolol succinate (TOPROL-XL) 100 MG 24 hr tablet Take 1 tablet (100 mg total) by mouth once daily. 24  Rachel Chance,    potassium chloride SA (KLOR-CON M20) 20 MEQ tablet Take 1 tablet (20 mEq total) by mouth 2 (two) times daily. 24   Santino Hartmanica L, FNP   pravastatin (PRAVACHOL) 40 MG tablet Take 1 tablet (40 mg total) by mouth every evening. 24  DevanSantinoica L, FNP   sacubitriL-valsartan (ENTRESTO) 49-51 mg per tablet Take 1 tablet by mouth 2 (two) times daily. 12/11/23 12/10/24  Santino Hartmanica L, FNP   tadalafiL (CIALIS) 20 MG Tab Take 20 mg by mouth daily as needed.  Patient not taking: Reported on 2024   Provider, Historical   testosterone cypionate (DEPOTESTOTERONE CYPIONATE) 200 mg/mL injection Inject 200 mg into the muscle every 14 (fourteen) days. 5/15/24   Provider, Historical   XARELTO 20 mg Tab Take 1 tablet (20 mg total) by mouth once daily. 9/15/23 9/14/24  Christin Hartman, ULYSSESP         Review of Systems:  Negative for all symptoms other than those listed in HPI           Objective:   Last 24 Hour Vital Signs:  BP  Min: 113/78  Max: 139/90  Temp  Av.6 °F (36.4 °C)  Min: 97.2 °F (36.2  °C)  Max: 97.8 °F (36.6 °C)  Pulse  Av.9  Min: 71  Max: 120  Resp  Av.4  Min: 18  Max: 20  SpO2  Av.8 %  Min: 93 %  Max: 98 %  Weight  Av.7 kg (303 lb 9.6 oz)  Min: 137.7 kg (303 lb 9.6 oz)  Max: 137.7 kg (303 lb 9.6 oz)  Body mass index is 43.56 kg/m².  I/O last 3 completed shifts:  In: 240 [P.O.:240]  Out: 2750 [Urine:2750]    Physical Examination:  Physical Exam  Constitutional:       Appearance: He is obese.   HENT:      Head: Normocephalic and atraumatic.   Eyes:      Extraocular Movements: Extraocular movements intact.      Conjunctiva/sclera: Conjunctivae normal.      Pupils: Pupils are equal, round, and reactive to light.   Neck:      Vascular: No carotid bruit.   Cardiovascular:      Rate and Rhythm: Tachycardia present. Rhythm irregular.      Heart sounds: Normal heart sounds. No murmur heard.     No friction rub. No gallop.      Comments: Distant heart sounds  Pulmonary:      Effort: No respiratory distress.      Breath sounds: No wheezing or rales.      Comments: Difficult to auscultate  Abdominal:      General: Bowel sounds are normal.      Palpations: Abdomen is soft.   Musculoskeletal:      Cervical back: No rigidity or tenderness.      Right lower leg: No edema.      Left lower leg: No edema.   Lymphadenopathy:      Cervical: No cervical adenopathy.   Skin:     Capillary Refill: Capillary refill takes less than 2 seconds.   Neurological:      General: No focal deficit present.      Mental Status: He is alert and oriented to person, place, and time.           Laboratory:  Most Recent Data:  CBC:   Lab Results   Component Value Date    WBC 13.00 (H) 2024    HGB 13.2 (L) 2024    HCT 41.0 (L) 2024     2024    MCV 94.9 (H) 2024    RDW 17.7 (H) 2024     WBC Differential:   Recent Labs   Lab 24  1042 24  0411 24  0313   WBC 12.47* 12.51* 13.00*   HGB 13.7* 13.3* 13.2*   HCT 41.5* 40.4* 41.0*    244 249   MCV 92.8 93.7  "94.9*     BMP:   Lab Results   Component Value Date     06/13/2024    K 4.2 06/13/2024     06/13/2024    CO2 21 (L) 06/13/2024    BUN 26.9 (H) 06/13/2024    CREATININE 2.03 (H) 06/13/2024    CALCIUM 8.9 06/13/2024    MG 2.30 06/13/2024    PHOS 3.9 06/13/2024     LFTs:   Lab Results   Component Value Date    ALBUMIN 3.0 (L) 06/13/2024    BILITOT 0.8 06/13/2024    AST 31 06/13/2024    ALKPHOS 100 06/13/2024    ALT 36 06/13/2024     Coags:   Lab Results   Component Value Date    INR 1.6 (H) 07/26/2022     FLP:   Lab Results   Component Value Date    CHOL 165 07/26/2023    HDL 39 07/26/2023    TRIG 131 07/26/2023     DM:   Lab Results   Component Value Date    HGBA1C 5.5 04/30/2024    HGBA1C 5.8 07/26/2023    HGBA1C 5.9 01/26/2023    CREATININE 2.03 (H) 06/13/2024     Thyroid:   Lab Results   Component Value Date    TSH 2.1907 05/12/2022      Anemia: No results found for: "IRON", "TIBC", "FERRITIN", "SATURATEDIRO"  No results found for: "ZIBXOBXD30"  No results found for: "FOLATE"     Cardiac:   Lab Results   Component Value Date    TROPONINI 0.028 06/11/2024    BNP 2,646.1 (H) 06/11/2024     Urinalysis:   Lab Results   Component Value Date    COLORU Colorless (A) 11/03/2023    NITRITE Negative 11/03/2023    KETONESU Negative 01/12/2022    UROBILINOGEN Normal 11/03/2023    WBCUA 0-5 11/03/2023       Trended Lab Data:  Recent Labs   Lab 06/11/24  1042 06/12/24  0411 06/13/24  0313   WBC 12.47* 12.51* 13.00*   HGB 13.7* 13.3* 13.2*   HCT 41.5* 40.4* 41.0*    244 249   MCV 92.8 93.7 94.9*   RDW 17.7* 17.6* 17.7*    142 139   K 4.2 3.8 4.2   * 107 106   CO2 20* 22* 21*   BUN 32.6* 32.4* 26.9*   CREATININE 2.08* 2.11* 2.03*   ALBUMIN 3.4 3.1* 3.0*   BILITOT 1.2 1.0 0.8   AST 39* 32 31   ALKPHOS 105 94 100   ALT 48 45 36       Trended Cardiac Data:  Recent Labs   Lab 06/11/24  1042   TROPONINI 0.028   BNP 2,646.1*       Microbiology Data:  Microbiology Results (last 7 days)       ** No results " found for the last 168 hours. **             Other Results:    Radiology:  Imaging Results              CT Chest Without Contrast (Final result)  Result time 06/11/24 17:26:31      Final result by Geeta Campos MD (06/11/24 17:26:31)                   Impression:      1. No significant interval change compared to 04/24/2024.  2. Mediastinal lymphadenopathy and loculated bilateral pleural effusions      Electronically signed by: Geeta Campos  Date:    06/11/2024  Time:    17:26               Narrative:    EXAMINATION:  CT CHEST WITHOUT CONTRAST    CLINICAL HISTORY:  Pleural effusion, malignancy suspected;    TECHNIQUE:  CT imaging of the chest without IV contrast. Axial, coronal and sagittal reformatted images are reviewed. Dose length product is 528 mGycm. Automatic exposure control, adjustment of mA/kV or iterative reconstruction technique was used to limit radiation dose.    COMPARISON:  CT chest dated 04/24/2024    FINDINGS:  Lungs and large airways: No focal pulmonary nodule.  Mild septal thickening in the lung bases.    Pleura: Similar small bilateral loculated pleural effusions, right greater than left.    Heart and vasculature: The heart is stable in size.  There is no pericardial effusion.  There are scattered coronary calcifications.    Mediastinum and ashley: Similar enlarged mediastinal lymph nodes.  For example a right paratracheal lymph node measures 1.8 cm in short axis (series 6, image 31).    Chest wall/axilla and lower neck: No significant findings.    Upper abdomen: No significant findings.    Bones: No acute osseous findings.                                       X-Ray Chest 1 View (Final result)  Result time 06/11/24 12:55:40      Final result by Davis Cat MD (06/11/24 12:55:40)                   Impression:      Mild cardiomegaly with generalized pulmonary vascular prominence.      Electronically signed by: Davis Cat  Date:    06/11/2024  Time:    12:55                Narrative:    EXAMINATION:  XR CHEST 1 VIEW    CLINICAL HISTORY:  afib;    TECHNIQUE:  Frontal view(s) of the chest.    COMPARISON:  Radiography 04/30/2024    FINDINGS:  No significant change in left lower lung pleuroparenchymal opacity since April.  An area of fluid along the right minor fissure also has similar appearance.  Generalized pulmonary vascular prominence with no defined acute consolidation.  No pneumothorax.  Mildly enlarged cardiac silhouette.                                           Assessment & Plan:     CHF exacerbation  - EF 40% on 1/2024, moderate KIRSTEN and LAE, mild MR, mild to mod Pulm HTN  - Reports compliance with GDMT and Lasix 40mg BID  - No increased salt and alcohol intake over the weekend but does endorse increase intake of water although states he spends a significant amount of time working outside in the heat.   - Lasix 80 IV x 1 in ED with approx 1.2L urine output over last 24hrs.  Increased lasix to 60mg BID for now and consider switching to torsemide prior to discharge.   - Continue Entresto 24-26 b.i.d., Increasing Toprol to 100mg daily with 50mg qhs, Lipitor 80 q.d. Will titrate as tolerated to maximize benefits.   - Strict intake/output  - Monitor lytes and renal function  - cardiology consulted      Left loculated pleural effusion  - left loculated pleural effusion chronic  - CT chest showing stable effusion.  Will need further outpatient workup with pulmonology, appt scheduled in December      AF RVR  - EKG: AFib  - Xarelto 20 mg daily  - Continuous cardiac monitoring  - rate controlled.  Currently on Lopressor 100 mg q.d. and 50 mg at night.       LUIS EDUARDO on CKD  NAGMA  - Baseline Cr 1.29, Admission Cr 2.08  - Continue diuresis and monitor kidney function  - avoid nephrotoxic medications      T2DM  - Well controlled, A1c 5.9 in 1/2024  - Low dose SSI     HTN  HLD  -continue hydralazine 50mg BID   -continuing atorvastatin 80 mg q.d.  -other medications as above.      SHADI on CPAP  -  Resume CPAP QHS    CODE STATUS:  Full code  Access:  PIV  Antibiotics:  None  Diet:  Heart healthy  DVT Prophylaxis:  Lovenox  GI Prophylaxis:  None  Fluids:  None    Disposition:  Discharge to home after resolution of CHF exacerbation/ sob    Vlad Mccormick MD  [facility] Internal Medicine PGY-1  06/13/2024

## 2024-06-13 NOTE — CONSULTS
Spoke with pt & s.oCristal Dougherty, would like another home health company due to inability to get in contact with anyone at [facility] about sudden weight gain.  Referral submitted to [facility] via careport. Acceptance pending, will follow.

## 2024-06-14 VITALS
WEIGHT: 297.31 LBS | SYSTOLIC BLOOD PRESSURE: 106 MMHG | TEMPERATURE: 98 F | HEART RATE: 101 BPM | HEIGHT: 70 IN | OXYGEN SATURATION: 99 % | BODY MASS INDEX: 42.56 KG/M2 | RESPIRATION RATE: 18 BRPM | DIASTOLIC BLOOD PRESSURE: 76 MMHG

## 2024-06-14 LAB
ALBUMIN SERPL-MCNC: 3.1 G/DL (ref 3.4–4.8)
ALBUMIN/GLOB SERPL: 0.8 RATIO (ref 1.1–2)
ALP SERPL-CCNC: 106 UNIT/L (ref 40–150)
ALT SERPL-CCNC: 28 UNIT/L (ref 0–55)
ANION GAP SERPL CALC-SCNC: 13 MEQ/L
AST SERPL-CCNC: 18 UNIT/L (ref 5–34)
BASOPHILS # BLD AUTO: 0.05 X10(3)/MCL
BASOPHILS NFR BLD AUTO: 0.4 %
BILIRUB SERPL-MCNC: 0.6 MG/DL
BUN SERPL-MCNC: 20.9 MG/DL (ref 8.4–25.7)
CALCIUM SERPL-MCNC: 9 MG/DL (ref 8.8–10)
CHLORIDE SERPL-SCNC: 106 MMOL/L (ref 98–107)
CO2 SERPL-SCNC: 23 MMOL/L (ref 23–31)
CREAT SERPL-MCNC: 1.69 MG/DL (ref 0.73–1.18)
CREAT/UREA NIT SERPL: 12
EOSINOPHIL # BLD AUTO: 0.25 X10(3)/MCL (ref 0–0.9)
EOSINOPHIL NFR BLD AUTO: 1.8 %
ERYTHROCYTE [DISTWIDTH] IN BLOOD BY AUTOMATED COUNT: 17.3 % (ref 11.5–17)
GFR SERPLBLD CREATININE-BSD FMLA CKD-EPI: 44 ML/MIN/1.73/M2
GLOBULIN SER-MCNC: 4 GM/DL (ref 2.4–3.5)
GLUCOSE SERPL-MCNC: 97 MG/DL (ref 82–115)
HCT VFR BLD AUTO: 43.9 % (ref 42–52)
HGB BLD-MCNC: 14.3 G/DL (ref 14–18)
IMM GRANULOCYTES # BLD AUTO: 0.08 X10(3)/MCL (ref 0–0.04)
IMM GRANULOCYTES NFR BLD AUTO: 0.6 %
LYMPHOCYTES # BLD AUTO: 1.99 X10(3)/MCL (ref 0.6–4.6)
LYMPHOCYTES NFR BLD AUTO: 14.4 %
MAGNESIUM SERPL-MCNC: 2.1 MG/DL (ref 1.6–2.6)
MCH RBC QN AUTO: 30 PG (ref 27–31)
MCHC RBC AUTO-ENTMCNC: 32.6 G/DL (ref 33–36)
MCV RBC AUTO: 92 FL (ref 80–94)
MONOCYTES # BLD AUTO: 1.2 X10(3)/MCL (ref 0.1–1.3)
MONOCYTES NFR BLD AUTO: 8.7 %
NEUTROPHILS # BLD AUTO: 10.28 X10(3)/MCL (ref 2.1–9.2)
NEUTROPHILS NFR BLD AUTO: 74.1 %
NRBC BLD AUTO-RTO: 0 %
PHOSPHATE SERPL-MCNC: 3.8 MG/DL (ref 2.3–4.7)
PLATELET # BLD AUTO: 263 X10(3)/MCL (ref 130–400)
PMV BLD AUTO: 12.1 FL (ref 7.4–10.4)
POCT GLUCOSE: 106 MG/DL (ref 70–110)
POCT GLUCOSE: 83 MG/DL (ref 70–110)
POTASSIUM SERPL-SCNC: 3.6 MMOL/L (ref 3.5–5.1)
PROT SERPL-MCNC: 7.1 GM/DL (ref 5.8–7.6)
RBC # BLD AUTO: 4.77 X10(6)/MCL (ref 4.7–6.1)
SODIUM SERPL-SCNC: 142 MMOL/L (ref 136–145)
WBC # SPEC AUTO: 13.85 X10(3)/MCL (ref 4.5–11.5)

## 2024-06-14 PROCEDURE — 85025 COMPLETE CBC W/AUTO DIFF WBC: CPT

## 2024-06-14 PROCEDURE — 25000003 PHARM REV CODE 250

## 2024-06-14 PROCEDURE — 94660 CPAP INITIATION&MGMT: CPT

## 2024-06-14 PROCEDURE — 94761 N-INVAS EAR/PLS OXIMETRY MLT: CPT

## 2024-06-14 PROCEDURE — 25000242 PHARM REV CODE 250 ALT 637 W/ HCPCS

## 2024-06-14 PROCEDURE — 36415 COLL VENOUS BLD VENIPUNCTURE: CPT

## 2024-06-14 PROCEDURE — 80053 COMPREHEN METABOLIC PANEL: CPT

## 2024-06-14 PROCEDURE — 84100 ASSAY OF PHOSPHORUS: CPT

## 2024-06-14 PROCEDURE — 63600175 PHARM REV CODE 636 W HCPCS

## 2024-06-14 PROCEDURE — 83735 ASSAY OF MAGNESIUM: CPT

## 2024-06-14 PROCEDURE — 99900035 HC TECH TIME PER 15 MIN (STAT)

## 2024-06-14 RX ORDER — POTASSIUM CHLORIDE 20 MEQ/1
40 TABLET, EXTENDED RELEASE ORAL ONCE
Status: COMPLETED | OUTPATIENT
Start: 2024-06-14 | End: 2024-06-14

## 2024-06-14 RX ORDER — POLYETHYLENE GLYCOL 3350 17 G/17G
17 POWDER, FOR SOLUTION ORAL DAILY
Status: DISCONTINUED | OUTPATIENT
Start: 2024-06-14 | End: 2024-06-14 | Stop reason: HOSPADM

## 2024-06-14 RX ORDER — POLYETHYLENE GLYCOL 3350 17 G/17G
17 POWDER, FOR SOLUTION ORAL DAILY
Status: DISCONTINUED | OUTPATIENT
Start: 2024-06-14 | End: 2024-06-14

## 2024-06-14 RX ORDER — METOPROLOL SUCCINATE 50 MG/1
50 TABLET, EXTENDED RELEASE ORAL NIGHTLY
Qty: 90 TABLET | Refills: 2 | Status: SHIPPED | OUTPATIENT
Start: 2024-06-14 | End: 2025-06-14

## 2024-06-14 RX ORDER — METOPROLOL SUCCINATE 50 MG/1
50 TABLET, EXTENDED RELEASE ORAL NIGHTLY
Qty: 90 TABLET | Refills: 3 | Status: SHIPPED | OUTPATIENT
Start: 2024-06-14 | End: 2024-06-14

## 2024-06-14 RX ORDER — TORSEMIDE 20 MG/1
20 TABLET ORAL 2 TIMES DAILY
Qty: 60 TABLET | Refills: 11 | Status: SHIPPED | OUTPATIENT
Start: 2024-06-14 | End: 2024-06-14

## 2024-06-14 RX ORDER — TORSEMIDE 20 MG/1
20 TABLET ORAL 2 TIMES DAILY
Qty: 60 TABLET | Refills: 7 | Status: SHIPPED | OUTPATIENT
Start: 2024-06-14 | End: 2025-06-14

## 2024-06-14 RX ADMIN — HYDRALAZINE HYDROCHLORIDE 50 MG: 50 TABLET ORAL at 09:06

## 2024-06-14 RX ADMIN — POLYETHYLENE GLYCOL 3350 17 G: 17 POWDER, FOR SOLUTION ORAL at 11:06

## 2024-06-14 RX ADMIN — RIVAROXABAN 20 MG: 10 TABLET, FILM COATED ORAL at 09:06

## 2024-06-14 RX ADMIN — EMPAGLIFLOZIN 10 MG: 10 TABLET, FILM COATED ORAL at 09:06

## 2024-06-14 RX ADMIN — CETIRIZINE HYDROCHLORIDE 5 MG: 5 TABLET ORAL at 09:06

## 2024-06-14 RX ADMIN — FAMOTIDINE 20 MG: 20 TABLET, FILM COATED ORAL at 09:06

## 2024-06-14 RX ADMIN — POTASSIUM CHLORIDE 40 MEQ: 1500 TABLET, EXTENDED RELEASE ORAL at 10:06

## 2024-06-14 RX ADMIN — SACUBITRIL AND VALSARTAN 2 TABLET: 24; 26 TABLET, FILM COATED ORAL at 09:06

## 2024-06-14 RX ADMIN — FUROSEMIDE 60 MG: 10 INJECTION, SOLUTION INTRAMUSCULAR; INTRAVENOUS at 09:06

## 2024-06-14 RX ADMIN — ATORVASTATIN CALCIUM 80 MG: 40 TABLET, FILM COATED ORAL at 09:06

## 2024-06-14 RX ADMIN — METOPROLOL SUCCINATE 100 MG: 50 TABLET, EXTENDED RELEASE ORAL at 09:06

## 2024-06-14 NOTE — CONSULTS
Spoke with Magy in the sleep lab, pt had a sleep study in July of 2023, he was delivered a cpap, but due to noncompliance, it was picked up. Pt will need a new sleep study to qualify for another cpap.

## 2024-06-14 NOTE — PLAN OF CARE
Problem: Adult Inpatient Plan of Care  Goal: Plan of Care Review  Outcome: Met  Goal: Patient-Specific Goal (Individualized)  Outcome: Met  Goal: Absence of Hospital-Acquired Illness or Injury  Outcome: Met  Goal: Optimal Comfort and Wellbeing  Outcome: Met  Goal: Readiness for Transition of Care  Outcome: Met     Problem: Bariatric Environmental Safety  Goal: Safety Maintained with Care  Outcome: Met     Problem: Diabetes Comorbidity  Goal: Blood Glucose Level Within Targeted Range  Outcome: Met     Problem: Acute Kidney Injury/Impairment  Goal: Fluid and Electrolyte Balance  Outcome: Met  Goal: Improved Oral Intake  Outcome: Met  Goal: Effective Renal Function  Outcome: Met

## 2024-06-14 NOTE — DISCHARGE SUMMARY
[facility] Discharge Summary    Admitting Physician: Demetrius Arrington MD  Attending Physician: Demetrius Arrington MD  Date of Admit: 6/11/2024  Date of Discharge: 6/14/2024    Condition: Good  Outcome: Condition has improved and patient is now ready for discharge.  DISPOSITION: Home or Self Care          Discharge Diagnoses     Patient Active Problem List   Diagnosis    Atrial flutter    Hyperlipidemia    Hypertension    Type 2 diabetes mellitus with kidney complication, without long-term current use of insulin    Tobacco user    Subclinical hyperthyroidism    Other male erectile dysfunction    Acute systolic heart failure    AMD (age-related macular degeneration), bilateral    Stage 3a chronic kidney disease    Persistent proteinuria    Chronic pain of both knees    SHADI on CPAP    Class 3 severe obesity due to excess calories with serious comorbidity and body mass index (BMI) of 45.0 to 49.9 in adult    Non-seasonal allergic rhinitis due to pollen    Acute exacerbation of CHF (congestive heart failure)    Atrial fibrillation    LUIS EDUARDO (acute kidney injury)    Dyspnea    Chest pain       Principal Problem:  <principal problem not specified>    Consultants and Procedures     Consultants:  IP CONSULT TO CARDIOLOGY  IP CONSULT TO SOCIAL WORK/CASE MANAGEMENT  IP CONSULT TO SOCIAL WORK/CASE MANAGEMENT    Procedures:   * No surgery found *     Brief Admission History      Jamey Duke is a 63 y.o.  male who with a history of  PMH of CHF (EF 40% in 1/2023), CKD, Afib on Xarelto with failed DCCV and ablation, and SHADI on CPAP, left-sided loculated pleural effusion . The patient presented to [facility] ED   on 6/11/2024  with a primary complaint of Weakness and Shortness of Breath (C/o weakness, sob. Sent from cardiology clinic for a fib)     Patient reports for the past month he was getting more more short of breath.  He reports he was unable to walk more than 10-15 feet.  Patient  reports after his previous admission  for heart failure exacerbation he was discharged with home health.  He has noticed he is gained about 7 lb in the last 2 weeks.  He has attempted to call home health in order to adjust his medication but was unable to reach them.  Patient also endorses he drinks up to 8 bottles of 8 fluid oz of water a day.  This morning he was seen in cardiology clinic for routine follow up.  Due to severity of symptoms and debilitation with shortness of breath patient was asked to go to the emergency department and be evaluated.      Per chart review it was noted in the cardiology note from earlier this morning the patient was complaining of atypical chest pain and orthopnea.  This was not endorsed by the patient on my interview.      Patient has had extensive cardiology workup previously.  His  most recent echo was completed on 04/26/2024 which showed an ejection fraction of 40%.  It was indeterminate on the matter of diastolic dysfunction due to poor acoustic windows.  There was moderate global hypokinesis present.  Patient has also had a cardiac catheterization 07/29/2022 that revealed normal coronary arteries.     In the emergency department laboratory workup was notable for a white count of 12.47, bicarb 20, BUN of 32.6 and a creatinine of 2.08.  BNP was markedly elevated in the setting of CKD at 2646.  Chest x-ray notable for worsening of loculated pleural effusion on left side.  Cardiomegaly also noted on chest x-ray.  EKGs unremarkable.     Patient was denies any recent syncopal episodes, palpitations, or chest pain.    Hospital Course with Pertinent Findings     Patient admitted due to shortness of breath. BNP was found to be elevated. Patient looked clinically fluid overloaded. Cardiology was consulted. Patient found to be AF. Patient is also had an LUIS EDUARDO. He was diuresed with increased lasix, placed CPAP, antihypertensives restarted and patient improved.     Discharge physical exam:  Vitals  BP: (!) 135/93  Temp: 97.5 °F  "(36.4 °C)  Temp Source: Oral  Pulse: 100  Resp: 18  SpO2: 95 %  Height: 5' 10" (177.8 cm)  Weight: 134.9 kg (297 lb 4.8 oz)    Physical Exam  Constitutional:       Appearance: He is obese.   HENT:      Head: Normocephalic and atraumatic.   Eyes:      Extraocular Movements: Extraocular movements intact.      Conjunctiva/sclera: Conjunctivae normal.      Pupils: Pupils are equal, round, and reactive to light.   Neck:      Vascular: No carotid bruit.   Cardiovascular:      Rate and Rhythm: Tachycardia present. Rhythm irregular.      Heart sounds: Normal heart sounds. No murmur heard.     No friction rub. No gallop.      Comments: Distant heart sounds  Pulmonary:      Effort: No respiratory distress.      Breath sounds: No wheezing or rales.      Comments: Difficult to auscultate  Abdominal:      General: Bowel sounds are normal.      Palpations: Abdomen is soft.   Musculoskeletal:      Cervical back: No rigidity or tenderness.      Right lower leg: No edema.      Left lower leg: No edema.   Lymphadenopathy:      Cervical: No cervical adenopathy.   Skin:     Capillary Refill: Capillary refill takes less than 2 seconds.   Neurological:      General: No focal deficit present.      Mental Status: He is alert and oriented to person, place, and time.        TIME SPENT ON DISCHARGE: 60 minutes    Discharge Medications        Medication List        START taking these medications      torsemide 20 MG Tab  Commonly known as: DEMADEX  Take 1 tablet (20 mg total) by mouth 2 (two) times a day.            CHANGE how you take these medications      * metoprolol succinate 100 MG 24 hr tablet  Commonly known as: TOPROL-XL  Take 1 tablet (100 mg total) by mouth once daily.  What changed: Another medication with the same name was added. Make sure you understand how and when to take each.     * metoprolol succinate 50 MG 24 hr tablet  Commonly known as: TOPROL-XL  Take 1 tablet (50 mg total) by mouth every evening.  What changed: You were " already taking a medication with the same name, and this prescription was added. Make sure you understand how and when to take each.           * This list has 2 medication(s) that are the same as other medications prescribed for you. Read the directions carefully, and ask your doctor or other care provider to review them with you.                CONTINUE taking these medications      blood sugar diagnostic Strp  Inject 1 strip into the skin 2 (two) times a day.     blood-glucose meter kit     cetirizine 10 MG tablet  Commonly known as: ZYRTEC  Take 1 tablet (10 mg total) by mouth nightly as needed for Allergies or Rhinitis.     * EASY TOUCH TWIST LANCETS 33 gauge Misc  Generic drug: lancets     * LANCETS MISC     empagliflozin 10 mg tablet  Commonly known as: JARDIANCE  Take 1 tablet (10 mg total) by mouth once daily.     ENTRESTO 49-51 mg per tablet  Generic drug: sacubitriL-valsartan  Take 1 tablet by mouth 2 (two) times daily.     famotidine 20 MG tablet  Commonly known as: PEPCID     fluticasone propionate 50 mcg/actuation nasal spray  Commonly known as: FLONASE  1 spray (50 mcg total) by Each Nostril route once daily.     hydrALAZINE 50 MG tablet  Commonly known as: APRESOLINE  Take 1 tablet (50 mg total) by mouth 2 (two) times daily.     potassium chloride SA 20 MEQ tablet  Commonly known as: KLOR-CON M20  Take 1 tablet (20 mEq total) by mouth 2 (two) times daily.     pravastatin 40 MG tablet  Commonly known as: PRAVACHOL  Take 1 tablet (40 mg total) by mouth every evening.     testosterone cypionate 200 mg/mL injection  Commonly known as: DEPOTESTOTERONE CYPIONATE     XARELTO 20 mg Tab  Generic drug: rivaroxaban  Take 1 tablet (20 mg total) by mouth once daily.           * This list has 2 medication(s) that are the same as other medications prescribed for you. Read the directions carefully, and ask your doctor or other care provider to review them with you.                STOP taking these medications       FARXIGA 5 mg Tab tablet  Generic drug: dapagliflozin propanediol     furosemide 40 MG tablet  Commonly known as: LASIX            ASK your doctor about these medications      tadalafiL 20 MG Tab  Commonly known as: CIALIS               Where to Get Your Medications        These medications were sent to [facility] - [place], [place] - [address]  [address] [place] [address]      Phone: [phone]   metoprolol succinate 50 MG 24 hr tablet  torsemide 20 MG Tab         Discharge Information:     Ms. Jamey Duke is being discharged .    Take torsemide BID. Low salt diet, keep fluid intake to less than 1500cc. Continue to lose weight. Exercise regularly. Maintain low intake of alcohol. Quit all tobacco products. Continue to follow with cardiology. Establish a PCP. Take metoprolol xl 100mg in AM and 50mg in PM.     Follow-Up Appointments:      To address at follow-up:  -Weight. Fluid status  - Heart rate  - CPAP machine and sleep study      The above information was discussed with the patient in clear terms. She was able to repeat the instructions to me in her own words. All questions answered. ED precautions provided.      Vlad Mccormick M.D  [facility] PGY-1

## 2024-06-17 DIAGNOSIS — G47.33 OSA ON CPAP: Primary | ICD-10-CM

## 2024-06-18 RX ORDER — CETIRIZINE HYDROCHLORIDE 10 MG/1
10 TABLET ORAL NIGHTLY PRN
Qty: 30 TABLET | Refills: 3 | Status: SHIPPED | OUTPATIENT
Start: 2024-06-18

## 2024-06-19 ENCOUNTER — PATIENT OUTREACH (OUTPATIENT)
Dept: ADMINISTRATIVE | Facility: CLINIC | Age: 64
End: 2024-06-19
Payer: COMMERCIAL

## 2024-06-19 NOTE — PROGRESS NOTES
C3 nurse attempted to contact Jamey Duke  for a TCC post hospital discharge follow up call. No answer. Left voicemail with callback information. The patient does not have a scheduled HOSFU appointment. Message sent to PCP staff for assistance with scheduling visit with patient.

## 2024-06-19 NOTE — PROGRESS NOTES
Heart Failure Follow Up Call:  Upon discharge:      EF 40%      /93          Discharge Medications:  START taking these medications       torsemide 20 MG Tab  Commonly known as: DEMADEX  Take 1 tablet (20 mg total) by mouth 2 (two) times a day.       CHANGE how you take these medications       * metoprolol succinate 100 MG 24 hr tablet  Commonly known as: TOPROL-XL  Take 1 tablet (100 mg total) by mouth once daily.  What changed: Another medication with the same name was added. Make sure you understand how and when to take each.    * metoprolol succinate 50 MG 24 hr tablet  Commonly known as: TOPROL-XL  Take 1 tablet (50 mg total) by mouth every evening.  What changed: You were already taking a medication with the same name, and this prescription was added. Make sure you understand how and when to take each.       CONTINUE taking these medications       cetirizine 10 MG tablet  Commonly known as: ZYRTEC  Take 1 tablet (10 mg total) by mouth nightly as needed for Allergies or Rhinitis.          empagliflozin 10 mg tablet  Commonly known as: JARDIANCE  Take 1 tablet (10 mg total) by mouth once daily.    ENTRESTO 49-51 mg per tablet  Generic drug: sacubitriL-valsartan  Take 1 tablet by mouth 2 (two) times daily.    famotidine 20 MG tablet  Commonly known as: PEPCID    fluticasone propionate 50 mcg/actuation nasal spray  Commonly known as: FLONASE  1 spray (50 mcg total) by Each Nostril route once daily.    hydrALAZINE 50 MG tablet  Commonly known as: APRESOLINE  Take 1 tablet (50 mg total) by mouth 2 (two) times daily.    potassium chloride SA 20 MEQ tablet  Commonly known as: KLOR-CON M20  Take 1 tablet (20 mEq total) by mouth 2 (two) times daily.    pravastatin 40 MG tablet  Commonly known as: PRAVACHOL  Take 1 tablet (40 mg total) by mouth every evening.    testosterone cypionate 200 mg/mL injection  Commonly known as: DEPOTESTOTERONE CYPIONATE    XARELTO 20 mg Tab  Generic drug: rivaroxaban  Take 1  tablet (20 mg total) by mouth once daily.       STOP taking these medications       FARXIGA 5 mg Tab tablet  Generic drug: dapagliflozin propanediol    furosemide 40 MG tablet  Commonly known as: LASIX       Discussed patients discharge medications.    Patient is compliant and taking all medications as prescribed.  No side effects from medications were noted.    Discussed fluid pills and how to take if on an as needed prescription.  Discussed with patient the importance of taking and recording daily weights.  Discussed briefly how patient is doing with their heart healthy diet.  Reminded patient of upcoming appointment on  7/11/24 0800 Cardiology  Instructed if symptoms worsen to return to the emergency room.       Tiffanie Silva RPh

## 2024-06-20 NOTE — PROGRESS NOTES
3rd attempt made to reach patient for TCC call. Left voicemail please call [phone] leave first name, last, and date of birth for Chelsey. I will return your call.

## 2024-06-20 NOTE — PROGRESS NOTES
2nd attempt made to reach patient for TCC call. Left voicemail please call [phone] leave first name, last, and date of birth for Chelsey. I will return your call.

## 2024-07-09 ENCOUNTER — DOCUMENT SCAN (OUTPATIENT)
Dept: HOME HEALTH SERVICES | Facility: HOSPITAL | Age: 64
End: 2024-07-09
Payer: COMMERCIAL

## 2024-07-12 ENCOUNTER — DOCUMENT SCAN (OUTPATIENT)
Dept: HOME HEALTH SERVICES | Facility: HOSPITAL | Age: 64
End: 2024-07-12
Payer: COMMERCIAL

## 2024-07-23 ENCOUNTER — EXTERNAL HOME HEALTH (OUTPATIENT)
Dept: HOME HEALTH SERVICES | Facility: HOSPITAL | Age: 64
End: 2024-07-23
Payer: COMMERCIAL

## 2024-07-29 PROBLEM — N17.9 AKI (ACUTE KIDNEY INJURY): Status: RESOLVED | Noted: 2024-04-24 | Resolved: 2024-07-29

## 2024-08-14 NOTE — PROGRESS NOTES
CHIEF COMPLAINT:   Chief Complaint   Patient presents with    Follow-up     Denies any cardiac porblems                   Review of patient's allergies indicates:  No Known Allergies                                       HPI:  Jamey Duke 63 y.o. male with  NICMO/HFrEF (EF- 40% per ECHO 4.26.24 ), H/o persistent AF/AFL (s/p multiple attempted DCCVs but has had recurrence of AFL)(on Xarelto), Carotid artery stenosis, HTN, HLD, DM II, SHADI, and tobacco use (cigars)  who presents to cardiology clinic for routine follow up and ongoing care.  He is a former patient of Dr. Gutierrez and Dr. Syed.  At his last clinic visit, the patient presented with symptoms of worsening exertional dyspnea, bilateral lower extremity edema and was found to be volume overloaded. He was directed to the emergency room for admission.  Patient was also noted to be in AFib, along with LUIS EDUARDO.  Notable Labs: BNP-2646.1, creat-1.6 troponin negative.  The patient was diuresed with increased Lasix and placed on CPAP. He was ultimately discharged with medications adjustments.      Latest inpatient Echocardiogram obtained on 4.26.24 revealed an EF of 40%, indeterminate diastolic function, and no significant valvular abnormalities, which is stable from previous EF of 40% per ECHO Jan. 2023.  Most recent ischemic evaluation includes a Left Heart Catheterization on 7.29.22 that revealed normal coronary arteries. Notably, the patient underwent a previous LHC in 2017 that revealed normal coronary arteries.    Patient presents to clinic today reporting that he feels well since his hospitalization.  He denies any cardiovascular complaints of chest pain, shortness of breath, abdominal distention, orthopnea, PND, peripheral edema, palpitations, lightheadedness, dizziness, near-syncope or syncope.  The patient states that he has resumed his routine activities and has been at his camp for the past month and even assisted in screening in his porch.  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  He endorses compliance with his current medications and denies any adverse bleeding issues with Xarelto.  Notably, the patient has a previous diagnosis of SHADI.  He was noncompliant due to anxiety and pressure intolerance.  He is still having symptoms of EDS, snoring and witnessed apnea and is still in need of a Pap device.     PMH: HTN, HLD, h/o persistent AF/AFL, and h/o NICMP  PSH: ablation  Social History: Denies any illicit drug, tobacco use-smokes occasional cigar . Reports occasional ETOH use  Family History: Father -leaky valve    CARDIAC TESTING:    ECHO 4.26.24       Technically difficult study. Optison contrast used.    Atrial fibrillation observed.    Left Ventricle: The left ventricle is normal in size. Moderate global hypokinesis present. There is moderately reduced systolic function. Biplane (2D) method of discs ejection fraction is 40%. There is indeterminate diastolic function.    Right Ventricle: Right ventricle was not well visualized due to poor acoustic window.    Left Atrium: Left atrium is moderately dilated. The left atrium volume index MOD is 42.6 mL/m2.    Aortic Valve: The aortic valve is a trileaflet valve. There is no stenosis. There is no significant regurgitation.    Mitral Valve: There is no stenosis. There is no significant regurgitation.    Tricuspid Valve: There is no significant regurgitation.    Pulmonic Valve: There is no significant regurgitation.    Aorta: Aortic root is normal in size measuring 3.4 cm.    IVC/SVC: Intermediate venous pressure at 8 mmHg.       ECHO 1.30.23  The left ventricle is normal in size with mildly decreased systolic function.  Normal right ventricular size with normal right ventricular systolic function.  The estimated ejection fraction is 40%.  Moderate right atrial enlargement.  Mild mitral regurgitation.  Atrial fibrillation observed.  There is mild to moderate pulmonary  hypertension.  Moderate left atrial enlargement.    Cardiac catheterization 7.29.22    Conclusion  · The pre-procedure left ventricular end diastolic pressure was 22.  · The estimated blood loss was none.  · The coronary arteries were normal..    FINDINGS  The patient has No CAD  Blood loss:  less than 15 cc.    RECOMMENDATIONS  Medical management -- maximize GDMT  Risk factor modifications -- smoking cessation  Activity -- avoid straining with affected limb for one week  Exercise on regular basis.    Lexiscan stress test 6.20.22       Abnormal myocardial perfusion scan.    There are two significant perfusion abnormalities.    Perfusion Abnormality #1 - There is a moderate to severe intensity, moderate to large sized, reversible perfusion abnormality that is consistent with ischemia in the basal to apical lateral apical wall(s) in the typical distribution of the LCX territory.    Perfusion Abnormality #2 - There is a small to moderate sized, moderate intensity, reversible perfusion abnormality that is consistent with ischemia in the basal to mid anterior wall(s) in the typical distribution of the LAD territory.    There are no other significant perfusion abnormalities.    The gated perfusion images showed an ejection fraction of 36% at rest. The gated perfusion images showed an ejection fraction of 33% post stress.    The EKG portion of this study is abnormal but not diagnostic.    The patient reported no chest pain during the stress test.    There were no arrhythmias during stress.     If clinically indicated, consider further evaluation with coronary angiogram.       ECHO 5.27.22     The left ventricle is normal in size with moderately decreased systolic function.  The estimated ejection fraction is 35%.  Atrial fibrillation observed.  Mild mitral regurgitation.  Mild tricuspid regurgitation.  IVC is dilated.IVC collapses >50%.  Intermediate central venous pressure (8 mmHg).  The estimated PA systolic pressure is  37 mmHg.  Normal right ventricular size with normal right ventricular systolic function.  There is no pulmonary hypertension.       ECHO 1.24.20    1. The study quality is below average.  2. The left ventricle is normal in size. Global left ventricular systolic function is mildly decreased. The left ventricular ejection fraction is 45%.  3. The left atrial diameter is mildly increased. The right atrium is mildly enlarged.  4. Mild (1+) mitral regurgitation. Mild (1+) tricuspid regurgitation.  5. The pulmonary artery systolic pressure is 35 mmHg.     PET 12.6.18   This is a normal perfusion study, no perfusion defects noted.   This scan is suggestive of low risk for future cardiovascular events.   The left ventricular cavity is noted to be moderately enlarged on the stress studies. The stress left ventricular ejection fraction was calculated to be 45% and left ventricular global function is mildly reduced. The rest left ventricular cavity is noted to be mildly enlarged. The rest left ventricular ejection fraction was calculated to be 39% and rest left ventricular global function is moderately reduced.   When compared to the resting ejection fraction (39%), the stress ejection fraction (45%) has increased.   The study quality is good.    Carotid US 2.9.18  1. The study quality is below average.  2. 1-39% stenosis in the proximal right internal carotid artery based on Bluth Criteria.  3. Antegrade right vertebral artery flow.  4. Antegrade left vertebral artery flow    [facility] 1.3.17  EF -20%  Normal coronaries                                                                                                                                                                                                                                                                                                      Patient Active Problem List   Diagnosis    Atrial flutter    Hyperlipidemia    Hypertension    Type 2 diabetes mellitus with  kidney complication, without long-term current use of insulin    Tobacco user    Subclinical hyperthyroidism    Other male erectile dysfunction    Acute systolic heart failure    AMD (age-related macular degeneration), bilateral    Stage 3a chronic kidney disease    Persistent proteinuria    Chronic pain of both knees    SHADI on CPAP    Class 3 severe obesity due to excess calories with serious comorbidity and body mass index (BMI) of 45.0 to 49.9 in adult    Non-seasonal allergic rhinitis due to pollen    Acute exacerbation of CHF (congestive heart failure)    Atrial fibrillation    Dyspnea    Chest pain     Past Surgical History:   Procedure Laterality Date    ANGIOGRAM, CORONARY, WITH LEFT HEART CATHETERIZATION N/A 07/29/2022    Procedure: Angiogram, Coronary, with Left Heart Cath;  Surgeon: Yossi Zuniga MD;  Location: [facility] CATH LAB;  Service: Cardiology;  Laterality: N/A;    Cardioversion x 2      COLONOSCOPY  10/24/2023    Comprehensive electrophysiologic eval. including insertion & repositioning of multiple electrode catheters with induction or attempted induction of arrhythmias with left atrial pacing & recordingfrom coronary sinus or left atrium  07/10/2018    comprehensive electrophysiologic eval. insertion & repositioning multiple electrode catheters with induction or attempted induction of an arrhythmia with right atrial pacing and recording, right ventricular pacing and recording  07/10/2018    Destruction of Conduction Mechanism, Percutaneous Approach  07/10/2018     Social History     Socioeconomic History    Marital status: Single   Occupational History    Occupation: retired   Tobacco Use    Smoking status: Some Days     Types: Cigars     Passive exposure: Current    Smokeless tobacco: Never    Tobacco comments:     Occasionally, last smoked 4/20/24   Substance and Sexual Activity    Alcohol use: Yes     Alcohol/week: 6.0 standard drinks of alcohol     Types: 6 Cans of beer per week     Comment:  Drinks occasionally beer on weekends    Drug use: Never    Sexual activity: Yes     Partners: Female     Social Determinants of Health     Financial Resource Strain: Low Risk  (6/12/2024)    Overall Financial Resource Strain (CARDIA)     Difficulty of Paying Living Expenses: Not very hard   Food Insecurity: No Food Insecurity (6/12/2024)    Hunger Vital Sign     Worried About Running Out of Food in the Last Year: Never true     Ran Out of Food in the Last Year: Never true   Transportation Needs: No Transportation Needs (6/12/2024)    TRANSPORTATION NEEDS     Transportation : No   Physical Activity: Insufficiently Active (6/12/2024)    Exercise Vital Sign     Days of Exercise per Week: 2 days     Minutes of Exercise per Session: 10 min   Stress: No Stress Concern Present (6/12/2024)    [employer] [place] [employer] - Occupational Stress Questionnaire     Feeling of Stress : Only a little   Housing Stability: Low Risk  (6/12/2024)    Housing Stability Vital Sign     Unable to Pay for Housing in the Last Year: No     Homeless in the Last Year: No        Family History   Problem Relation Name Age of Onset    Hypertension Mother      Valvular heart disease Father           Current Outpatient Medications:     cetirizine (ZYRTEC) 10 MG tablet, Take 1 tablet (10 mg total) by mouth nightly as needed for Allergies or Rhinitis., Disp: 30 tablet, Rfl: 3    fluticasone propionate (FLONASE) 50 mcg/actuation nasal spray, 1 spray (50 mcg total) by Each Nostril route once daily., Disp: 16 g, Rfl: 2    hydrALAZINE (APRESOLINE) 50 MG tablet, Take 1 tablet (50 mg total) by mouth 2 (two) times daily., Disp: 180 tablet, Rfl: 3    metoprolol succinate (TOPROL-XL) 100 MG 24 hr tablet, Take 1 tablet (100 mg total) by mouth once daily., Disp: 90 tablet, Rfl: 3    metoprolol succinate (TOPROL-XL) 50 MG 24 hr tablet, Take 1 tablet (50 mg total) by mouth every evening., Disp: 90 tablet, Rfl: 2    potassium chloride SA (KLOR-CON M20)  20 MEQ tablet, Take 1 tablet (20 mEq total) by mouth 2 (two) times daily., Disp: 90 tablet, Rfl: 3    pravastatin (PRAVACHOL) 40 MG tablet, Take 1 tablet (40 mg total) by mouth every evening., Disp: 90 tablet, Rfl: 3    sacubitriL-valsartan (ENTRESTO) 49-51 mg per tablet, Take 1 tablet by mouth 2 (two) times daily., Disp: 180 tablet, Rfl: 3    testosterone cypionate (DEPOTESTOTERONE CYPIONATE) 200 mg/mL injection, Inject 200 mg into the muscle every 14 (fourteen) days., Disp: , Rfl:     blood sugar diagnostic Strp, Inject 1 strip into the skin 2 (two) times a day., Disp: 100 strip, Rfl: 11    blood-glucose meter kit,  True Metrix Glucometer, See Instructions, Use to check blood glucose daily, # 1 EA, 0 Refill(s), Pharmacy: [facility], 177.8, cm, Height/Length Dosing, 01/12/22 9:55:00 CST, 148.4, kg, Weight Dosing, 01/12/22 9:55:00 CST, Disp: , Rfl:     EASY TOUCH TWIST LANCETS 33 gauge Misc, USE AS DIRECTED TO CHECK BLOOD GLUCOSE DAILY, Disp: , Rfl:     empagliflozin (JARDIANCE) 10 mg tablet, Take 1 tablet (10 mg total) by mouth once daily., Disp: 30 tablet, Rfl: 11    famotidine (PEPCID) 20 MG tablet, Take 20 mg by mouth 2 (two) times daily. (Patient not taking: Reported on 8/15/2024), Disp: , Rfl:     LANCETS MISC,  True Metrix Lancets, See Instructions, Use to check blood glucose daily, # 100 EA, 11 Refill(s), Pharmacy: [facility], 177.8, cm, Height/Length Dosing, 01/12/22 9:55:00 CST, 148.4, kg, Weight Dosing, 01/12/22 9:55:00 CST, Disp: , Rfl:     tadalafiL (CIALIS) 20 MG Tab, Take 20 mg by mouth daily as needed. (Patient not taking: Reported on 6/11/2024), Disp: , Rfl:     torsemide (DEMADEX) 20 MG Tab, Take 1 tablet (20 mg total) by mouth 2 (two) times a day., Disp: 180 tablet, Rfl: 3    XARELTO 20 mg Tab, Take 1 tablet (20 mg total) by mouth once daily., Disp: 90 tablet, Rfl: 3     ROS:                                                                                    "                                                                                          Review of Systems   Constitutional: Negative.    HENT: Negative.     Eyes: Negative.    Cardiovascular: Negative.    Gastrointestinal: Negative.    Genitourinary: Negative.    Musculoskeletal: Negative.    Skin: Negative.    Neurological: Negative.    Endo/Heme/Allergies: Negative.    Psychiatric/Behavioral: Negative.          Blood pressure 105/84, pulse (!) 56, temperature 98.4 °F (36.9 °C), temperature source Other (see comments), resp. rate 20, height 5' 10" (1.778 m), weight 135.6 kg (299 lb), SpO2 100%.   PE:  Physical Exam  Constitutional:       Appearance: Normal appearance.   HENT:      Head: Normocephalic.   Eyes:      Pupils: Pupils are equal, round, and reactive to light.   Cardiovascular:      Rate and Rhythm: Normal rate and regular rhythm.      Pulses: Normal pulses.   Pulmonary:      Effort: Pulmonary effort is normal.   Musculoskeletal:         General: Normal range of motion.   Skin:     General: Skin is warm and dry.   Neurological:      General: No focal deficit present.      Mental Status: He is alert and oriented to person, place, and time.   Psychiatric:         Mood and Affect: Mood normal.         Behavior: Behavior normal.                ASSESSMENT/PLAN:  NICMP/HFrEF NYHA Class III- IV  - EF- 40%, indeterminate diastolic function per ECHO 4.26.24  EF - 40% per ECHO 1.30.23 improved from -->35% per ECHO 5.27.22 --> 45% per ECHO (1.24.20)  - [facility]- normal coronaries (7.29.22)  - Lexiscan  stress test - to reversible abnormalities ischemic defects (6.20.22)  - [facility]- EF-20%, Normal coronaries (1.3.17)  - Reports resolution of symptoms since hospital discharge.  Denies any SOB or JOSHI   - Euvolemic upon exam, warm and dry  - Continue GDMT: metoprolol succinate 100 mg q am and metoprolol succinate 50 mg qhs, Entresto- 49/51 mg BID and Jardiance 10 mg  - Lasix discontinued during hospitalization.  The patient " currently tolerating torsemide 20 mg BID.  On supplemental potassium 20 mEq BID. as well.  Electrolyte stable     - Counseled patient on low-sodium diet and s/s of volume overload      H/o persistent AF/AFL Status post RFA on (7.10.18)   - Has multiple attempted DCCVs but has had recurrence of AFL  - Rate Control: Continue metoprolol succinate 100 mg qam and metoprolol succinate 50mg qhs    - Anticoagulation: and Xarelto 20 mg p.o. daily. Denies any adverse bleeding issues      HTN  - BP at goal   - Continue Entresto 46/51 mg BID, torsemide 20 mg BID, hydralazine 50 mg BID, metoprolol succinate 100 mg qam, and metoprolol succinate 50 mg qhs  - Counseled on low-sodium diet and exercise as tolerated      Diabetes Mellitus Type II   - A1C- 5.5  - Continue management per PCP     HLD  - LDL -100, above goal <70/DM per labs July 2023  - Continue Pravastatin 40 mg po daily.  - Counseled patient on low-cholesterol, low-fat diet, and encourage exercise as tolerated    Carotid Artery disease- Asymptomatic   - 1-39% stenosis in the proximal right internal carotid artery based on Bluth Criteria. (2018)    Tobacco use  - Reports occasional cigar use.  - Counseled on importance of smoking cessation    SHADI  - Patient has a previous diagnosis of SHADI.  He was noncompliant due to anxiety and pressure intolerance.  The patient is still experiencing symptoms of EDS, snoring and witnessed apnea and is still in need of a Pap device.  Retitration ordered.      Sleep Study Referral   Follow-up in cardiology clinic in 3 months or sooner if needed   Follow up with PCP as directed

## 2024-08-15 ENCOUNTER — DOCUMENT SCAN (OUTPATIENT)
Dept: HOME HEALTH SERVICES | Facility: HOSPITAL | Age: 64
End: 2024-08-15
Payer: COMMERCIAL

## 2024-08-15 ENCOUNTER — OFFICE VISIT (OUTPATIENT)
Dept: CARDIOLOGY | Facility: CLINIC | Age: 64
End: 2024-08-15
Payer: COMMERCIAL

## 2024-08-15 VITALS
RESPIRATION RATE: 20 BRPM | WEIGHT: 299 LBS | HEART RATE: 56 BPM | HEIGHT: 70 IN | BODY MASS INDEX: 42.8 KG/M2 | DIASTOLIC BLOOD PRESSURE: 84 MMHG | SYSTOLIC BLOOD PRESSURE: 105 MMHG | OXYGEN SATURATION: 100 % | TEMPERATURE: 98 F

## 2024-08-15 DIAGNOSIS — Z72.0 TOBACCO USER: Chronic | ICD-10-CM

## 2024-08-15 DIAGNOSIS — I48.92 ATRIAL FLUTTER, UNSPECIFIED TYPE: ICD-10-CM

## 2024-08-15 DIAGNOSIS — E78.2 MIXED HYPERLIPIDEMIA: Chronic | ICD-10-CM

## 2024-08-15 DIAGNOSIS — N18.31 STAGE 3A CHRONIC KIDNEY DISEASE: Chronic | ICD-10-CM

## 2024-08-15 DIAGNOSIS — I50.20 HFREF (HEART FAILURE WITH REDUCED EJECTION FRACTION): Primary | ICD-10-CM

## 2024-08-15 DIAGNOSIS — G47.33 OSA (OBSTRUCTIVE SLEEP APNEA): ICD-10-CM

## 2024-08-15 DIAGNOSIS — I10 PRIMARY HYPERTENSION: Chronic | ICD-10-CM

## 2024-08-15 DIAGNOSIS — I48.0 PAROXYSMAL ATRIAL FIBRILLATION: ICD-10-CM

## 2024-08-15 DIAGNOSIS — I42.8 NICM (NONISCHEMIC CARDIOMYOPATHY): ICD-10-CM

## 2024-08-15 PROCEDURE — 1159F MED LIST DOCD IN RCRD: CPT | Mod: CPTII,,, | Performed by: NURSE PRACTITIONER

## 2024-08-15 PROCEDURE — 3074F SYST BP LT 130 MM HG: CPT | Mod: CPTII,,, | Performed by: NURSE PRACTITIONER

## 2024-08-15 PROCEDURE — 1160F RVW MEDS BY RX/DR IN RCRD: CPT | Mod: CPTII,,, | Performed by: NURSE PRACTITIONER

## 2024-08-15 PROCEDURE — 3079F DIAST BP 80-89 MM HG: CPT | Mod: CPTII,,, | Performed by: NURSE PRACTITIONER

## 2024-08-15 PROCEDURE — 99215 OFFICE O/P EST HI 40 MIN: CPT | Mod: PBBFAC | Performed by: NURSE PRACTITIONER

## 2024-08-15 PROCEDURE — 3008F BODY MASS INDEX DOCD: CPT | Mod: CPTII,,, | Performed by: NURSE PRACTITIONER

## 2024-08-15 PROCEDURE — 99214 OFFICE O/P EST MOD 30 MIN: CPT | Mod: S$PBB,,, | Performed by: NURSE PRACTITIONER

## 2024-08-15 PROCEDURE — 3044F HG A1C LEVEL LT 7.0%: CPT | Mod: CPTII,,, | Performed by: NURSE PRACTITIONER

## 2024-08-15 PROCEDURE — 3066F NEPHROPATHY DOC TX: CPT | Mod: CPTII,,, | Performed by: NURSE PRACTITIONER

## 2024-08-15 PROCEDURE — 1101F PT FALLS ASSESS-DOCD LE1/YR: CPT | Mod: CPTII,,, | Performed by: NURSE PRACTITIONER

## 2024-08-15 PROCEDURE — 3288F FALL RISK ASSESSMENT DOCD: CPT | Mod: CPTII,,, | Performed by: NURSE PRACTITIONER

## 2024-08-15 RX ORDER — RIVAROXABAN 20 MG/1
20 TABLET, FILM COATED ORAL DAILY
Qty: 90 TABLET | Refills: 3 | Status: SHIPPED | OUTPATIENT
Start: 2024-08-15 | End: 2025-08-15

## 2024-08-15 RX ORDER — CETIRIZINE HYDROCHLORIDE 10 MG/1
TABLET ORAL
Qty: 30 TABLET | Refills: 3 | Status: SHIPPED | OUTPATIENT
Start: 2024-08-15

## 2024-08-15 RX ORDER — TORSEMIDE 20 MG/1
20 TABLET ORAL 2 TIMES DAILY
Qty: 180 TABLET | Refills: 3 | Status: SHIPPED | OUTPATIENT
Start: 2024-08-15 | End: 2025-08-15

## 2024-08-15 NOTE — TELEPHONE ENCOUNTER
Pharmacy requesting refill for pt's cetirizine (ZYRTEC) 10 MG tablet     LOV: 4/30/24      NOV:10/28/24

## 2024-08-20 DIAGNOSIS — G47.33 OSA ON CPAP: Primary | ICD-10-CM

## 2024-08-27 NOTE — ADDENDUM NOTE
Addended by: GEORGIANA HERNÁNDEZ on: 8/27/2024 11:21 AM     Modules accepted: Orders, Level of Service

## 2024-10-22 ENCOUNTER — PROCEDURE VISIT (OUTPATIENT)
Dept: SLEEP MEDICINE | Facility: HOSPITAL | Age: 64
End: 2024-10-22
Attending: NURSE PRACTITIONER
Payer: COMMERCIAL

## 2024-10-22 DIAGNOSIS — G47.33 OSA ON CPAP: ICD-10-CM

## 2024-10-22 PROCEDURE — 95811 POLYSOM 6/>YRS CPAP 4/> PARM: CPT

## 2024-10-30 ENCOUNTER — OFFICE VISIT (OUTPATIENT)
Dept: INTERNAL MEDICINE | Facility: CLINIC | Age: 64
End: 2024-10-30
Payer: COMMERCIAL

## 2024-10-30 VITALS
DIASTOLIC BLOOD PRESSURE: 78 MMHG | HEIGHT: 70 IN | OXYGEN SATURATION: 99 % | HEART RATE: 86 BPM | SYSTOLIC BLOOD PRESSURE: 122 MMHG | TEMPERATURE: 98 F | RESPIRATION RATE: 16 BRPM | WEIGHT: 304.63 LBS | BODY MASS INDEX: 43.61 KG/M2

## 2024-10-30 DIAGNOSIS — E11.22 TYPE 2 DIABETES MELLITUS WITH STAGE 3A CHRONIC KIDNEY DISEASE, WITHOUT LONG-TERM CURRENT USE OF INSULIN: Chronic | ICD-10-CM

## 2024-10-30 DIAGNOSIS — N18.31 STAGE 3A CHRONIC KIDNEY DISEASE: Chronic | ICD-10-CM

## 2024-10-30 DIAGNOSIS — I10 PRIMARY HYPERTENSION: Primary | Chronic | ICD-10-CM

## 2024-10-30 DIAGNOSIS — Z13.6 SCREENING FOR ABDOMINAL AORTIC ANEURYSM: ICD-10-CM

## 2024-10-30 DIAGNOSIS — N18.31 TYPE 2 DIABETES MELLITUS WITH STAGE 3A CHRONIC KIDNEY DISEASE, WITHOUT LONG-TERM CURRENT USE OF INSULIN: Chronic | ICD-10-CM

## 2024-10-30 DIAGNOSIS — Z23 INFLUENZA VACCINE NEEDED: ICD-10-CM

## 2024-10-30 DIAGNOSIS — E78.2 MIXED HYPERLIPIDEMIA: Chronic | ICD-10-CM

## 2024-10-30 DIAGNOSIS — I50.21 ACUTE SYSTOLIC HEART FAILURE: Chronic | ICD-10-CM

## 2024-10-30 DIAGNOSIS — K21.9 GASTROESOPHAGEAL REFLUX DISEASE, UNSPECIFIED WHETHER ESOPHAGITIS PRESENT: ICD-10-CM

## 2024-10-30 DIAGNOSIS — E05.90 SUBCLINICAL HYPERTHYROIDISM: ICD-10-CM

## 2024-10-30 DIAGNOSIS — E87.6 HYPOKALEMIA: ICD-10-CM

## 2024-10-30 PROBLEM — R07.9 CHEST PAIN: Status: RESOLVED | Noted: 2024-06-12 | Resolved: 2024-10-30

## 2024-10-30 PROCEDURE — G0008 ADMIN INFLUENZA VIRUS VAC: HCPCS | Mod: PBBFAC

## 2024-10-30 PROCEDURE — 3074F SYST BP LT 130 MM HG: CPT | Mod: CPTII,,,

## 2024-10-30 PROCEDURE — 3288F FALL RISK ASSESSMENT DOCD: CPT | Mod: CPTII,,,

## 2024-10-30 PROCEDURE — 3044F HG A1C LEVEL LT 7.0%: CPT | Mod: CPTII,,,

## 2024-10-30 PROCEDURE — 1159F MED LIST DOCD IN RCRD: CPT | Mod: CPTII,,,

## 2024-10-30 PROCEDURE — 99214 OFFICE O/P EST MOD 30 MIN: CPT | Mod: S$PBB,,,

## 2024-10-30 PROCEDURE — 1101F PT FALLS ASSESS-DOCD LE1/YR: CPT | Mod: CPTII,,,

## 2024-10-30 PROCEDURE — 90653 IIV ADJUVANT VACCINE IM: CPT | Mod: PBBFAC

## 2024-10-30 PROCEDURE — 3066F NEPHROPATHY DOC TX: CPT | Mod: CPTII,,,

## 2024-10-30 PROCEDURE — G2211 COMPLEX E/M VISIT ADD ON: HCPCS | Mod: S$PBB,,,

## 2024-10-30 PROCEDURE — 99214 OFFICE O/P EST MOD 30 MIN: CPT | Mod: PBBFAC,25

## 2024-10-30 PROCEDURE — 1160F RVW MEDS BY RX/DR IN RCRD: CPT | Mod: CPTII,,,

## 2024-10-30 PROCEDURE — 3078F DIAST BP <80 MM HG: CPT | Mod: CPTII,,,

## 2024-10-30 PROCEDURE — 1126F AMNT PAIN NOTED NONE PRSNT: CPT | Mod: CPTII,,,

## 2024-10-30 PROCEDURE — 3008F BODY MASS INDEX DOCD: CPT | Mod: CPTII,,,

## 2024-10-30 RX ORDER — FAMOTIDINE 20 MG/1
20 TABLET, FILM COATED ORAL 2 TIMES DAILY PRN
Qty: 30 TABLET | Refills: 6 | Status: SHIPPED | OUTPATIENT
Start: 2024-10-30

## 2024-10-30 RX ORDER — POTASSIUM CHLORIDE 20 MEQ/1
20 TABLET, EXTENDED RELEASE ORAL 2 TIMES DAILY
Qty: 90 TABLET | Refills: 3 | Status: SHIPPED | OUTPATIENT
Start: 2024-10-30

## 2024-10-30 RX ADMIN — INFLUENZA A VIRUS A/VICTORIA/4897/2022 IVR-238 (H1N1) ANTIGEN (FORMALDEHYDE INACTIVATED), INFLUENZA A VIRUS A/THAILAND/8/2022 IVR-237 (H3N2) ANTIGEN (FORMALDEHYDE INACTIVATED), INFLUENZA B VIRUS B/AUSTRIA/1359417/2021 BVR-26 ANTIGEN (FORMALDEHYDE INACTIVATED) 0.5 ML: 15; 15; 15 INJECTION, SUSPENSION INTRAMUSCULAR at 09:10

## 2024-11-15 NOTE — PROGRESS NOTES
CHIEF COMPLAINT:   Chief Complaint   Patient presents with    Follow-up     Denies any cardiac problems                   Review of patient's allergies indicates:  No Known Allergies                                       HPI:  Jamey Duke 64 y.o. male with  NICMO (EF- 40% per ECHO 4.26.24 ), H/o persistent AF/AFL (s/p multiple attempted DCCVs but has had recurrence of AFL)(on Xarelto), Carotid artery stenosis, HTN, HLD, DM II, SHADI/CPAP, and tobacco use (cigars)  who presents to cardiology clinic for routine follow up and ongoing care.  He is a former patient of Dr. Gutierrez and Dr. Syed.   Latest Echocardiogram obtained on 4.26.24 revealed an EF of 40%, indeterminate diastolic function, and no significant valvular abnormalities, which is stable from previous EF of 40% per ECHO Jan. 2023.  Most recent ischemic evaluation includes a Left Heart Catheterization on 7.29.22 that revealed normal coronary arteries. Notably, the patient underwent a previous LHC in 2017 that revealed normal coronary arteries.    Patient presents to clinic today reporting that he feels well overall.  He endorses occasional episodes of shortness of breath whenever he over exerts himself but notes overall improvement.  The patient is able to perform his routine activities which includes hunting without experiencing any anginal or anginal equivalent symptoms.  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  He denies other cardiovascular complaints of chest pain, palpitations, orthopnea, PND, peripheral edema, claudication, lightheadedness dizziness, near-syncope or syncope.  He does endorse fatigue but states that he is still attempting to obtain his CPAP machine.    He endorses compliance with his current medications and denies any adverse bleeding issues with Xarelto.      PMH: HTN, HLD, h/o persistent AF/AFL, and h/o NICMP  PSH: ablation  Social History: Denies any illicit drug,  tobacco use-smokes occasional cigar . Reports occasional ETOH use  Family History: Father -leaky valve    CARDIAC TESTING:    ECHO 4.26.24       Technically difficult study. Optison contrast used.    Atrial fibrillation observed.    Left Ventricle: The left ventricle is normal in size. Moderate global hypokinesis present. There is moderately reduced systolic function. Biplane (2D) method of discs ejection fraction is 40%. There is indeterminate diastolic function.    Right Ventricle: Right ventricle was not well visualized due to poor acoustic window.    Left Atrium: Left atrium is moderately dilated. The left atrium volume index MOD is 42.6 mL/m2.    Aortic Valve: The aortic valve is a trileaflet valve. There is no stenosis. There is no significant regurgitation.    Mitral Valve: There is no stenosis. There is no significant regurgitation.    Tricuspid Valve: There is no significant regurgitation.    Pulmonic Valve: There is no significant regurgitation.    Aorta: Aortic root is normal in size measuring 3.4 cm.    IVC/SVC: Intermediate venous pressure at 8 mmHg.       ECHO 1.30.23  The left ventricle is normal in size with mildly decreased systolic function.  Normal right ventricular size with normal right ventricular systolic function.  The estimated ejection fraction is 40%.  Moderate right atrial enlargement.  Mild mitral regurgitation.  Atrial fibrillation observed.  There is mild to moderate pulmonary hypertension.  Moderate left atrial enlargement.    Cardiac catheterization 7.29.22    Conclusion  · The pre-procedure left ventricular end diastolic pressure was 22.  · The estimated blood loss was none.  · The coronary arteries were normal..    FINDINGS  The patient has No CAD  Blood loss:  less than 15 cc.    RECOMMENDATIONS  Medical management -- maximize GDMT  Risk factor modifications -- smoking cessation  Activity -- avoid straining with affected limb for one week  Exercise on regular basis.    Lexiscan  stress test 6.20.22       Abnormal myocardial perfusion scan.    There are two significant perfusion abnormalities.    Perfusion Abnormality #1 - There is a moderate to severe intensity, moderate to large sized, reversible perfusion abnormality that is consistent with ischemia in the basal to apical lateral apical wall(s) in the typical distribution of the LCX territory.    Perfusion Abnormality #2 - There is a small to moderate sized, moderate intensity, reversible perfusion abnormality that is consistent with ischemia in the basal to mid anterior wall(s) in the typical distribution of the LAD territory.    There are no other significant perfusion abnormalities.    The gated perfusion images showed an ejection fraction of 36% at rest. The gated perfusion images showed an ejection fraction of 33% post stress.    The EKG portion of this study is abnormal but not diagnostic.    The patient reported no chest pain during the stress test.    There were no arrhythmias during stress.     If clinically indicated, consider further evaluation with coronary angiogram.       ECHO 5.27.22     The left ventricle is normal in size with moderately decreased systolic function.  The estimated ejection fraction is 35%.  Atrial fibrillation observed.  Mild mitral regurgitation.  Mild tricuspid regurgitation.  IVC is dilated.IVC collapses >50%.  Intermediate central venous pressure (8 mmHg).  The estimated PA systolic pressure is 37 mmHg.  Normal right ventricular size with normal right ventricular systolic function.  There is no pulmonary hypertension.       ECHO 1.24.20    1. The study quality is below average.  2. The left ventricle is normal in size. Global left ventricular systolic function is mildly decreased. The left ventricular ejection fraction is 45%.  3. The left atrial diameter is mildly increased. The right atrium is mildly enlarged.  4. Mild (1+) mitral regurgitation. Mild (1+) tricuspid regurgitation.  5. The pulmonary  artery systolic pressure is 35 mmHg.     PET 12.6.18   This is a normal perfusion study, no perfusion defects noted.   This scan is suggestive of low risk for future cardiovascular events.   The left ventricular cavity is noted to be moderately enlarged on the stress studies. The stress left ventricular ejection fraction was calculated to be 45% and left ventricular global function is mildly reduced. The rest left ventricular cavity is noted to be mildly enlarged. The rest left ventricular ejection fraction was calculated to be 39% and rest left ventricular global function is moderately reduced.   When compared to the resting ejection fraction (39%), the stress ejection fraction (45%) has increased.   The study quality is good.    Carotid US 2.9.18  1. The study quality is below average.  2. 1-39% stenosis in the proximal right internal carotid artery based on Bluth Criteria.  3. Antegrade right vertebral artery flow.  4. Antegrade left vertebral artery flow    [facility] 1.3.17  EF -20%  Normal coronaries                                                                                                                                                                                                                                                                                                      Patient Active Problem List   Diagnosis    Atrial flutter    Hyperlipidemia    Hypertension    Type 2 diabetes mellitus with kidney complication, without long-term current use of insulin    Tobacco user    Subclinical hyperthyroidism    Other male erectile dysfunction    AMD (age-related macular degeneration), bilateral    Stage 3a chronic kidney disease    Persistent proteinuria    Chronic pain of both knees    SHADI on CPAP    Class 3 severe obesity due to excess calories with serious comorbidity and body mass index (BMI) of 45.0 to 49.9 in adult    Non-seasonal allergic rhinitis due to pollen    Atrial fibrillation    Dyspnea     NICM (nonischemic cardiomyopathy)     Past Surgical History:   Procedure Laterality Date    ANGIOGRAM, CORONARY, WITH LEFT HEART CATHETERIZATION N/A 07/29/2022    Procedure: Angiogram, Coronary, with Left Heart Cath;  Surgeon: Yossi Zuniga MD;  Location: [facility] CATH LAB;  Service: Cardiology;  Laterality: N/A;    Cardioversion x 2      COLONOSCOPY  10/24/2023    Comprehensive electrophysiologic eval. including insertion & repositioning of multiple electrode catheters with induction or attempted induction of arrhythmias with left atrial pacing & recordingfrom coronary sinus or left atrium  07/10/2018    comprehensive electrophysiologic eval. insertion & repositioning multiple electrode catheters with induction or attempted induction of an arrhythmia with right atrial pacing and recording, right ventricular pacing and recording  07/10/2018    Destruction of Conduction Mechanism, Percutaneous Approach  07/10/2018     Social History     Socioeconomic History    Marital status: Single   Occupational History    Occupation: retired   Tobacco Use    Smoking status: Some Days     Types: Cigars     Passive exposure: Current    Smokeless tobacco: Never    Tobacco comments:     Occasionally, last smoked 9/30/24   Substance and Sexual Activity    Alcohol use: Yes     Alcohol/week: 6.0 standard drinks of alcohol     Types: 6 Cans of beer per week     Comment: Drinks occasionally beer on weekends    Drug use: Never    Sexual activity: Yes     Partners: Female     Social Drivers of Health     Financial Resource Strain: Low Risk  (6/12/2024)    Overall Financial Resource Strain (CARDIA)     Difficulty of Paying Living Expenses: Not very hard   Food Insecurity: No Food Insecurity (6/12/2024)    Hunger Vital Sign     Worried About Running Out of Food in the Last Year: Never true     Ran Out of Food in the Last Year: Never true   Transportation Needs: No Transportation Needs (6/12/2024)    TRANSPORTATION NEEDS     Transportation :  No   Physical Activity: Insufficiently Active (6/12/2024)    Exercise Vital Sign     Days of Exercise per Week: 2 days     Minutes of Exercise per Session: 10 min   Stress: No Stress Concern Present (6/12/2024)    [employer] - Occupational Stress Questionnaire     Feeling of Stress : Only a little   Housing Stability: Low Risk  (6/12/2024)    Housing Stability Vital Sign     Unable to Pay for Housing in the Last Year: No     Homeless in the Last Year: No        Family History   Problem Relation Name Age of Onset    Hypertension Mother      Valvular heart disease Father           Current Outpatient Medications:     blood sugar diagnostic Strp, Inject 1 strip into the skin 2 (two) times a day., Disp: 100 strip, Rfl: 11    blood-glucose meter kit,  True Metrix Glucometer, See Instructions, Use to check blood glucose daily, # 1 EA, 0 Refill(s), Pharmacy: [facility], 177.8, cm, Height/Length Dosing, 01/12/22 9:55:00 CST, 148.4, kg, Weight Dosing, 01/12/22 9:55:00 CST, Disp: , Rfl:     cetirizine (ZYRTEC) 10 MG tablet, TAKE 1 TABLET BY MOUTH nightly AS NEEDED FOR allergies OR rhinitis, Disp: 30 tablet, Rfl: 3    EASY TOUCH TWIST LANCETS 33 gauge Misc, USE AS DIRECTED TO CHECK BLOOD GLUCOSE DAILY, Disp: , Rfl:     empagliflozin (JARDIANCE) 10 mg tablet, Take 1 tablet (10 mg total) by mouth once daily., Disp: 30 tablet, Rfl: 11    famotidine (PEPCID) 20 MG tablet, Take 1 tablet (20 mg total) by mouth 2 (two) times daily as needed for Heartburn., Disp: 30 tablet, Rfl: 6    fluticasone propionate (FLONASE) 50 mcg/actuation nasal spray, 1 spray (50 mcg total) by Each Nostril route once daily., Disp: 16 g, Rfl: 2    hydrALAZINE (APRESOLINE) 50 MG tablet, Take 1 tablet (50 mg total) by mouth 2 (two) times daily., Disp: 180 tablet, Rfl: 3    LANCETS MISC,  True Metrix Lancets, See Instructions, Use to check blood glucose daily, # 100 EA, 11 Refill(s), Pharmacy: [facility], 177.8, cm, Height/Length Dosing, 01/12/22 9:55:00 CST, 148.4, kg, Weight Dosing, 01/12/22 9:55:00 CST, Disp: , Rfl:     metoprolol succinate (TOPROL-XL) 100 MG 24 hr tablet, Take 1 tablet (100 mg total) by mouth once daily., Disp: 90 tablet, Rfl: 3    metoprolol succinate (TOPROL-XL) 50 MG 24 hr tablet, Take 1 tablet (50 mg total) by mouth every evening., Disp: 90 tablet, Rfl: 2    potassium chloride SA (KLOR-CON M20) 20 MEQ tablet, Take 1 tablet (20 mEq total) by mouth 2 (two) times daily., Disp: 90 tablet, Rfl: 3    pravastatin (PRAVACHOL) 40 MG tablet, Take 1 tablet (40 mg total) by mouth every evening., Disp: 90 tablet, Rfl: 3    sacubitriL-valsartan (ENTRESTO) 49-51 mg per tablet, Take 1 tablet by mouth 2 (two) times daily., Disp: 180 tablet, Rfl: 3    testosterone cypionate (DEPOTESTOTERONE CYPIONATE) 200 mg/mL injection, Inject 200 mg into the muscle every 14 (fourteen) days., Disp: , Rfl:     torsemide (DEMADEX) 20 MG Tab, Take 1 tablet (20 mg total) by mouth 2 (two) times a day., Disp: 180 tablet, Rfl: 3    XARELTO 20 mg Tab, Take 1 tablet (20 mg total) by mouth once daily., Disp: 90 tablet, Rfl: 3     ROS:                                                                                                                                                                             Review of Systems   Constitutional: Negative.    HENT: Negative.     Eyes: Negative.    Respiratory:  Positive for shortness of breath.    Cardiovascular:  Positive for palpitations. Negative for chest pain.   Gastrointestinal: Negative.    Genitourinary: Negative.    Musculoskeletal: Negative.    Skin: Negative.    Neurological: Negative.    Endo/Heme/Allergies: Negative.    Psychiatric/Behavioral: Negative.          Blood pressure 115/88, pulse 90, temperature 97.5 °F (36.4 °C), temperature source Oral, resp. rate 20, height 5' 10" (1.778 m), weight (!) 138 kg (304 lb 3.2 oz), SpO2 100%.   PE:  Physical " Exam  Constitutional:       Appearance: Normal appearance.   HENT:      Head: Normocephalic.   Eyes:      Pupils: Pupils are equal, round, and reactive to light.   Cardiovascular:      Rate and Rhythm: Normal rate and regular rhythm.      Pulses: Normal pulses.   Pulmonary:      Effort: Pulmonary effort is normal.   Musculoskeletal:         General: Normal range of motion.   Skin:     General: Skin is warm and dry.   Neurological:      General: No focal deficit present.      Mental Status: He is alert and oriented to person, place, and time.   Psychiatric:         Mood and Affect: Mood normal.         Behavior: Behavior normal.                ASSESSMENT/PLAN:  NICMP/HFrEF NYHA Class III- IV  - EF- 40%, indeterminate diastolic function per ECHO 4.26.24  EF - 40% per ECHO 1.30.23 improved from -->35% per ECHO 5.27.22 --> 45% per ECHO (1.24.20)  - [facility]- normal coronaries (7.29.22)  - Lexiscan  stress test - to reversible abnormalities ischemic defects (6.20.22)  - C- EF-20%, Normal coronaries (1.3.17)  - Reports SOB with over exertion.  Able to perform his routine ADLs and activities without any exertional dyspnea  - Euvolemic upon exam, warm and dry  - Continue GDMT: metoprolol succinate 100 mg q am and metoprolol succinate 50 mg qhs, Entresto- 49/51 mg BID and Jardiance 10 mg  - Continue Torsemide 20 mg BID.  On supplemental potassium 20 mEq BID. as well.  Electrolyte stable     - Counseled patient on low-sodium diet and s/s of volume overload      H/o persistent AF/AFL Status post RFA on (7.10.18)   - Has multiple attempted DCCVs but has had recurrence of AFL  - Denies palpitations  - Rate Control: Continue metoprolol succinate 100 mg qam and metoprolol succinate 50mg qhs    - Anticoagulation: and Xarelto 20 mg p.o. daily. Denies any adverse bleeding issues      HTN  - BP at goal 115/88  - Continue Entresto 46/51 mg BID, torsemide 20 mg BID, hydralazine 50 mg BID, metoprolol succinate 100 mg qam, and metoprolol  succinate 50 mg qhs  - Counseled on low-sodium diet and exercise as tolerated      Diabetes Mellitus Type II   - A1C- 5.9  - Continue management per PCP     HLD  - LDL -118, above goal <70/DM   - Increase Pravastatin to 80 mg to achieve LDL goal  - Counseled patient on low-cholesterol, low-fat diet, and encourage exercise as tolerated  - Repeat CMP/FLP prior to next visit    Carotid Artery disease- Asymptomatic   - 1-39% stenosis in the proximal right internal carotid artery based on Bluth Criteria. (2018)    Tobacco use  - Reports occasional cigar use.  - Counseled on importance of smoking cessation    SHADI  - Patient has a previous diagnosis of SHADI.  He was noncompliant due to anxiety and pressure intolerance.  The patient is still experiencing symptoms of EDS, snoring and witnessed apnea and is still in need of a Pap device.  Retitration ordered.  - Patient is attempting to obtain CPAP      Increase Pravastatin to 80 mg   Follow-up in cardiology clinic in 4 months with CMP/FLP or sooner if needed   Follow up with PCP as directed

## 2024-11-18 ENCOUNTER — LAB VISIT (OUTPATIENT)
Dept: LAB | Facility: HOSPITAL | Age: 64
End: 2024-11-18
Payer: COMMERCIAL

## 2024-11-18 ENCOUNTER — OFFICE VISIT (OUTPATIENT)
Dept: CARDIOLOGY | Facility: CLINIC | Age: 64
End: 2024-11-18
Payer: COMMERCIAL

## 2024-11-18 VITALS
TEMPERATURE: 98 F | OXYGEN SATURATION: 100 % | WEIGHT: 304.19 LBS | HEART RATE: 90 BPM | SYSTOLIC BLOOD PRESSURE: 115 MMHG | RESPIRATION RATE: 20 BRPM | DIASTOLIC BLOOD PRESSURE: 88 MMHG | HEIGHT: 70 IN | BODY MASS INDEX: 43.55 KG/M2

## 2024-11-18 DIAGNOSIS — E11.22 TYPE 2 DIABETES MELLITUS WITH STAGE 3A CHRONIC KIDNEY DISEASE, WITHOUT LONG-TERM CURRENT USE OF INSULIN: ICD-10-CM

## 2024-11-18 DIAGNOSIS — E78.2 MIXED HYPERLIPIDEMIA: Chronic | ICD-10-CM

## 2024-11-18 DIAGNOSIS — Z72.0 TOBACCO USER: Chronic | ICD-10-CM

## 2024-11-18 DIAGNOSIS — N18.31 TYPE 2 DIABETES MELLITUS WITH STAGE 3A CHRONIC KIDNEY DISEASE, WITHOUT LONG-TERM CURRENT USE OF INSULIN: ICD-10-CM

## 2024-11-18 DIAGNOSIS — I10 PRIMARY HYPERTENSION: Chronic | ICD-10-CM

## 2024-11-18 DIAGNOSIS — I42.8 NICM (NONISCHEMIC CARDIOMYOPATHY): Primary | ICD-10-CM

## 2024-11-18 DIAGNOSIS — I48.19 PERSISTENT ATRIAL FIBRILLATION: ICD-10-CM

## 2024-11-18 DIAGNOSIS — I48.92 ATRIAL FLUTTER, UNSPECIFIED TYPE: Chronic | ICD-10-CM

## 2024-11-18 DIAGNOSIS — I10 PRIMARY HYPERTENSION: ICD-10-CM

## 2024-11-18 DIAGNOSIS — N18.31 STAGE 3A CHRONIC KIDNEY DISEASE: Chronic | ICD-10-CM

## 2024-11-18 DIAGNOSIS — G47.33 OSA ON CPAP: ICD-10-CM

## 2024-11-18 PROBLEM — I50.9 ACUTE EXACERBATION OF CHF (CONGESTIVE HEART FAILURE): Status: RESOLVED | Noted: 2024-04-24 | Resolved: 2024-11-18

## 2024-11-18 PROBLEM — I50.21 ACUTE SYSTOLIC HEART FAILURE: Chronic | Status: RESOLVED | Noted: 2022-06-09 | Resolved: 2024-11-18

## 2024-11-18 LAB
ALBUMIN SERPL-MCNC: 3.4 G/DL (ref 3.4–4.8)
ALBUMIN/GLOB SERPL: 0.8 RATIO (ref 1.1–2)
ALP SERPL-CCNC: 123 UNIT/L (ref 40–150)
ALT SERPL-CCNC: 22 UNIT/L (ref 0–55)
ANION GAP SERPL CALC-SCNC: 11 MEQ/L
AST SERPL-CCNC: 30 UNIT/L (ref 5–34)
BACTERIA #/AREA URNS AUTO: ABNORMAL /HPF
BASOPHILS # BLD AUTO: 0.05 X10(3)/MCL
BASOPHILS NFR BLD AUTO: 0.4 %
BILIRUB SERPL-MCNC: 0.6 MG/DL
BILIRUB UR QL STRIP.AUTO: NEGATIVE
BUN SERPL-MCNC: 24.2 MG/DL (ref 8.4–25.7)
CALCIUM SERPL-MCNC: 9.1 MG/DL (ref 8.8–10)
CHLORIDE SERPL-SCNC: 101 MMOL/L (ref 98–107)
CHOLEST SERPL-MCNC: 182 MG/DL
CHOLEST/HDLC SERPL: 4 {RATIO} (ref 0–5)
CLARITY UR: CLEAR
CO2 SERPL-SCNC: 27 MMOL/L (ref 23–31)
COLOR UR AUTO: ABNORMAL
CREAT SERPL-MCNC: 1.72 MG/DL (ref 0.72–1.25)
CREAT UR-MCNC: 82.7 MG/DL (ref 63–166)
CREAT/UREA NIT SERPL: 14
EOSINOPHIL # BLD AUTO: 0.19 X10(3)/MCL (ref 0–0.9)
EOSINOPHIL NFR BLD AUTO: 1.5 %
ERYTHROCYTE [DISTWIDTH] IN BLOOD BY AUTOMATED COUNT: 18.8 % (ref 11.5–17)
EST. AVERAGE GLUCOSE BLD GHB EST-MCNC: 122.6 MG/DL
GFR SERPLBLD CREATININE-BSD FMLA CKD-EPI: 43 ML/MIN/1.73/M2
GLOBULIN SER-MCNC: 4.3 GM/DL (ref 2.4–3.5)
GLUCOSE SERPL-MCNC: 145 MG/DL (ref 82–115)
GLUCOSE UR QL STRIP: ABNORMAL
HBA1C MFR BLD: 5.9 %
HCT VFR BLD AUTO: 56.1 % (ref 42–52)
HDLC SERPL-MCNC: 42 MG/DL (ref 35–60)
HGB BLD-MCNC: 18.3 G/DL (ref 14–18)
HGB UR QL STRIP: NEGATIVE
HYALINE CASTS #/AREA URNS LPF: ABNORMAL /LPF
IMM GRANULOCYTES # BLD AUTO: 0.05 X10(3)/MCL (ref 0–0.04)
IMM GRANULOCYTES NFR BLD AUTO: 0.4 %
KETONES UR QL STRIP: NEGATIVE
LDLC SERPL CALC-MCNC: 118 MG/DL (ref 50–140)
LEUKOCYTE ESTERASE UR QL STRIP: 25
LYMPHOCYTES # BLD AUTO: 1.74 X10(3)/MCL (ref 0.6–4.6)
LYMPHOCYTES NFR BLD AUTO: 13.7 %
MCH RBC QN AUTO: 28.8 PG (ref 27–31)
MCHC RBC AUTO-ENTMCNC: 32.6 G/DL (ref 33–36)
MCV RBC AUTO: 88.3 FL (ref 80–94)
MICROALBUMIN UR-MCNC: 751.5 UG/ML
MICROALBUMIN/CREAT RATIO PNL UR: 908.7 MG/GM CR (ref 0–30)
MONOCYTES # BLD AUTO: 1.06 X10(3)/MCL (ref 0.1–1.3)
MONOCYTES NFR BLD AUTO: 8.4 %
MUCOUS THREADS URNS QL MICRO: ABNORMAL /LPF
NEUTROPHILS # BLD AUTO: 9.57 X10(3)/MCL (ref 2.1–9.2)
NEUTROPHILS NFR BLD AUTO: 75.6 %
NITRITE UR QL STRIP: NEGATIVE
NRBC BLD AUTO-RTO: 0 %
PH UR STRIP: 6 [PH]
PLATELET # BLD AUTO: 228 X10(3)/MCL (ref 130–400)
PLATELETS.RETICULATED NFR BLD AUTO: 10.7 % (ref 0.9–11.2)
PMV BLD AUTO: 11.6 FL (ref 7.4–10.4)
POTASSIUM SERPL-SCNC: 3.8 MMOL/L (ref 3.5–5.1)
PROT SERPL-MCNC: 7.7 GM/DL (ref 5.8–7.6)
PROT UR QL STRIP: ABNORMAL
RBC # BLD AUTO: 6.35 X10(6)/MCL (ref 4.7–6.1)
RBC #/AREA URNS AUTO: ABNORMAL /HPF
SODIUM SERPL-SCNC: 139 MMOL/L (ref 136–145)
SP GR UR STRIP.AUTO: 1.01 (ref 1–1.03)
SQUAMOUS #/AREA URNS LPF: ABNORMAL /HPF
TRIGL SERPL-MCNC: 112 MG/DL (ref 34–140)
TSH SERPL-ACNC: 1.8 UIU/ML (ref 0.35–4.94)
UROBILINOGEN UR STRIP-ACNC: NORMAL
VLDLC SERPL CALC-MCNC: 22 MG/DL
WBC # BLD AUTO: 12.66 X10(3)/MCL (ref 4.5–11.5)
WBC #/AREA URNS AUTO: ABNORMAL /HPF

## 2024-11-18 PROCEDURE — 1159F MED LIST DOCD IN RCRD: CPT | Mod: CPTII,,, | Performed by: NURSE PRACTITIONER

## 2024-11-18 PROCEDURE — 1101F PT FALLS ASSESS-DOCD LE1/YR: CPT | Mod: CPTII,,, | Performed by: NURSE PRACTITIONER

## 2024-11-18 PROCEDURE — 3288F FALL RISK ASSESSMENT DOCD: CPT | Mod: CPTII,,, | Performed by: NURSE PRACTITIONER

## 2024-11-18 PROCEDURE — 3066F NEPHROPATHY DOC TX: CPT | Mod: CPTII,,, | Performed by: NURSE PRACTITIONER

## 2024-11-18 PROCEDURE — 1160F RVW MEDS BY RX/DR IN RCRD: CPT | Mod: CPTII,,, | Performed by: NURSE PRACTITIONER

## 2024-11-18 PROCEDURE — 3074F SYST BP LT 130 MM HG: CPT | Mod: CPTII,,, | Performed by: NURSE PRACTITIONER

## 2024-11-18 PROCEDURE — 80053 COMPREHEN METABOLIC PANEL: CPT

## 2024-11-18 PROCEDURE — 82043 UR ALBUMIN QUANTITATIVE: CPT

## 2024-11-18 PROCEDURE — 36415 COLL VENOUS BLD VENIPUNCTURE: CPT

## 2024-11-18 PROCEDURE — 83036 HEMOGLOBIN GLYCOSYLATED A1C: CPT

## 2024-11-18 PROCEDURE — 80061 LIPID PANEL: CPT

## 2024-11-18 PROCEDURE — 85025 COMPLETE CBC W/AUTO DIFF WBC: CPT

## 2024-11-18 PROCEDURE — 84443 ASSAY THYROID STIM HORMONE: CPT

## 2024-11-18 PROCEDURE — 99215 OFFICE O/P EST HI 40 MIN: CPT | Mod: PBBFAC | Performed by: NURSE PRACTITIONER

## 2024-11-18 PROCEDURE — 3008F BODY MASS INDEX DOCD: CPT | Mod: CPTII,,, | Performed by: NURSE PRACTITIONER

## 2024-11-18 PROCEDURE — 3079F DIAST BP 80-89 MM HG: CPT | Mod: CPTII,,, | Performed by: NURSE PRACTITIONER

## 2024-11-18 PROCEDURE — 3044F HG A1C LEVEL LT 7.0%: CPT | Mod: CPTII,,, | Performed by: NURSE PRACTITIONER

## 2024-11-18 PROCEDURE — 81001 URINALYSIS AUTO W/SCOPE: CPT

## 2024-11-18 PROCEDURE — 99214 OFFICE O/P EST MOD 30 MIN: CPT | Mod: S$PBB,,, | Performed by: NURSE PRACTITIONER

## 2024-11-18 PROCEDURE — 3062F POS MACROALBUMINURIA REV: CPT | Mod: CPTII,,, | Performed by: NURSE PRACTITIONER

## 2024-11-18 RX ORDER — PRAVASTATIN SODIUM 80 MG/1
80 TABLET ORAL NIGHTLY
Qty: 90 TABLET | Refills: 3 | Status: SHIPPED | OUTPATIENT
Start: 2024-11-18 | End: 2025-11-18

## 2024-11-18 NOTE — PATIENT INSTRUCTIONS
Increase Pravastatin to 80 mg   Follow-up in cardiology clinic in 4 months with CMP/FLP or sooner if needed   Follow up with PCP as directed

## 2024-11-18 NOTE — PROGRESS NOTES
Labs reviewed, will discuss results with patient at their upcoming appointment.     MARIBELL Donnelly clears

## 2024-12-03 ENCOUNTER — TELEPHONE (OUTPATIENT)
Dept: INTERNAL MEDICINE | Facility: CLINIC | Age: 64
End: 2024-12-03
Payer: COMMERCIAL

## 2024-12-03 NOTE — TELEPHONE ENCOUNTER
----- Message from Kitty sent at 12/3/2024  7:53 AM CST -----  Regarding: questions  Who Called: Jamey Duke    Caller is requesting assistance/information from provider's office.    Symptoms (please be specific):      How long has patient had these symptoms:      List of preferred pharmacies on file (remove unneeded): [unfilled]  If different, enter pharmacy into here including location and phone number:         Preferred Method of Contact: Phone Call  Patient's Preferred Phone Number on File: [phone]   Best Call Back Number, if different:  Additional Information: Pt has an US of abdomen scheduled for tomorrow and would like to know what's the reason for the US; please advise.

## 2024-12-04 ENCOUNTER — PATIENT MESSAGE (OUTPATIENT)
Dept: INTERNAL MEDICINE | Facility: CLINIC | Age: 64
End: 2024-12-04
Payer: COMMERCIAL

## 2024-12-04 ENCOUNTER — HOSPITAL ENCOUNTER (OUTPATIENT)
Dept: RADIOLOGY | Facility: HOSPITAL | Age: 64
Discharge: HOME | End: 2024-12-04
Payer: COMMERCIAL

## 2024-12-04 DIAGNOSIS — Z13.6 SCREENING FOR ABDOMINAL AORTIC ANEURYSM: ICD-10-CM

## 2024-12-04 PROCEDURE — 76706 US ABDL AORTA SCREEN AAA: CPT | Mod: TC

## 2024-12-27 ENCOUNTER — OFFICE VISIT (OUTPATIENT)
Dept: INTERNAL MEDICINE | Facility: CLINIC | Age: 64
End: 2024-12-27
Payer: COMMERCIAL

## 2024-12-27 VITALS
SYSTOLIC BLOOD PRESSURE: 138 MMHG | RESPIRATION RATE: 20 BRPM | HEIGHT: 70 IN | BODY MASS INDEX: 44.22 KG/M2 | HEART RATE: 99 BPM | WEIGHT: 308.88 LBS | TEMPERATURE: 98 F | DIASTOLIC BLOOD PRESSURE: 101 MMHG | OXYGEN SATURATION: 97 %

## 2024-12-27 DIAGNOSIS — E78.2 MIXED HYPERLIPIDEMIA: Chronic | ICD-10-CM

## 2024-12-27 DIAGNOSIS — I10 PRIMARY HYPERTENSION: Chronic | ICD-10-CM

## 2024-12-27 DIAGNOSIS — N18.31 STAGE 3A CHRONIC KIDNEY DISEASE: Chronic | ICD-10-CM

## 2024-12-27 DIAGNOSIS — N18.31 TYPE 2 DIABETES MELLITUS WITH STAGE 3A CHRONIC KIDNEY DISEASE, WITHOUT LONG-TERM CURRENT USE OF INSULIN: Chronic | ICD-10-CM

## 2024-12-27 DIAGNOSIS — E11.22 TYPE 2 DIABETES MELLITUS WITH STAGE 3A CHRONIC KIDNEY DISEASE, WITHOUT LONG-TERM CURRENT USE OF INSULIN: Chronic | ICD-10-CM

## 2024-12-27 DIAGNOSIS — R31.21 ASYMPTOMATIC MICROSCOPIC HEMATURIA: Primary | ICD-10-CM

## 2024-12-27 DIAGNOSIS — E05.90 SUBCLINICAL HYPERTHYROIDISM: ICD-10-CM

## 2024-12-27 PROBLEM — E66.01 CLASS 3 SEVERE OBESITY DUE TO EXCESS CALORIES WITH SERIOUS COMORBIDITY AND BODY MASS INDEX (BMI) OF 45.0 TO 49.9 IN ADULT: Chronic | Status: RESOLVED | Noted: 2024-01-26 | Resolved: 2024-12-27

## 2024-12-27 PROBLEM — E66.813 CLASS 3 SEVERE OBESITY DUE TO EXCESS CALORIES WITH SERIOUS COMORBIDITY AND BODY MASS INDEX (BMI) OF 45.0 TO 49.9 IN ADULT: Chronic | Status: RESOLVED | Noted: 2024-01-26 | Resolved: 2024-12-27

## 2024-12-27 PROCEDURE — 99214 OFFICE O/P EST MOD 30 MIN: CPT | Mod: PBBFAC

## 2024-12-27 NOTE — ASSESSMENT & PLAN NOTE
A1C at goal- 5.9  Continue jardiance 10mg  Follow ADA Diet. Avoid soda, simple sweets, and limit rice/pasta/breads/starches (no more than 45-50 grams per meal).  Maintain healthy weight with goal BMI <30.  Exercise 5 times per week for 30 minutes per day.  Stressed importance of daily foot exams.  Stressed importance of annual dilated eye exam

## 2024-12-27 NOTE — ASSESSMENT & PLAN NOTE
FLP not at goal, medication recently increased following labs  Continue pravastatin 80mg  Stressed importance of dietary modifications. Follow a low cholesterol, low saturated fat diet with less that 200mg of cholesterol a day.  Avoid fried foods and high saturated fats (high saturated fats less than 7% of calories).  Add Flax Seed/Fish Oil supplements to diet. Increase dietary fiber.  Regular exercise can reduce LDL and raise HDL. Stressed importance of physical activity 5 times per week for 30 minutes per day.

## 2024-12-27 NOTE — PROGRESS NOTES
Yuliet Pickett, MARIBELL   [facility]  [address] [facility] [address] [facility] [address]      PATIENT NAME: Jamey Duke  : 1960  DATE: 24  MRN: [id number]      Reason for Visit / Chief Complaint: Follow-up, Diabetes, and Hyperlipidemia       History of Present Illness / Problem Focused Workflow     Jamey Duke presents to the clinic with Follow-up, Diabetes, and Hyperlipidemia     64 y.o.  male presents to the clinic. Medical problems include DM, HTN, HLD, h/o persistent AF/AFL, DEEP, and h/o NICMP w/multiple DCCVs -AFL recurred, A. Fib w/RVR, macular degeneration, CKD st 3, proteinuria and ED. Followed by [facility] cardio, urology, pulmonology, and nephrology clinics. Has parrish becerra  in [place].    10/30/24  Pt presents for DM, HTN, HLD follow up. Labs . Blood pressure at goal, he is compliant with medications. Refills provided appropriately. He is agreeable to RTC 1 month for HLD and DM with labs prior. Agreeable to flu vaccine today. Pt due for AAA screening u/s, agreeable to do so. No acute complaints.     24  Pt presents for HLD and DM follow up. Labs reviewed- stable and in acceptable ranges. Would benefit from tighter LDL management, cardiology recently increase pravastatin to 80mg. Hematuria remains, pt agreeable to CT abd pelvis. Pt denies acute complaints. Blood pressure elevated, he reports eating high sodium diet over the last few days with the holidays. Agreeable to RTC 1 week for BP check nurse visit. He is compliant with medications. RTC 6 months with labs for DM, HTN, HLD.           Review of Systems     Review of Systems   Constitutional:  Negative for fatigue, fever and unexpected weight change.   HENT:  Negative for ear pain, hearing loss, trouble swallowing and voice change.    Respiratory:  Negative for cough and shortness of breath.    Cardiovascular:  Negative for chest pain and palpitations.    Gastrointestinal:  Negative for abdominal pain, diarrhea and vomiting.   Genitourinary:  Negative for dysuria.   Musculoskeletal:  Negative for gait problem.   Skin:  Negative for rash and wound.   Neurological:  Negative for weakness.   Psychiatric/Behavioral:  Negative for suicidal ideas.          Medications and Allergies     Medications  Medication List with Changes/Refills   Current Medications    BLOOD SUGAR DIAGNOSTIC STRP    Inject 1 strip into the skin 2 (two) times a day.    BLOOD-GLUCOSE METER KIT      True Metrix Glucometer, See Instructions, Use to check blood glucose daily, # 1 EA, 0 Refill(s), Pharmacy: [facility], 177.8, cm, Height/Length Dosing, 01/12/22 9:55:00 CST, 148.4, kg, Weight Dosing, 01/12/22 9:55:00 CST    CETIRIZINE (ZYRTEC) 10 MG TABLET    TAKE 1 TABLET BY MOUTH nightly AS NEEDED FOR allergies OR rhinitis    EASY TOUCH TWIST LANCETS 33 GAUGE [facility]    USE AS DIRECTED TO CHECK BLOOD GLUCOSE DAILY    EMPAGLIFLOZIN (JARDIANCE) 10 MG TABLET    Take 1 tablet (10 mg total) by mouth once daily.    FAMOTIDINE (PEPCID) 20 MG TABLET    Take 1 tablet (20 mg total) by mouth 2 (two) times daily as needed for Heartburn.    HYDRALAZINE (APRESOLINE) 50 MG TABLET    Take 1 tablet (50 mg total) by mouth 2 (two) times daily.    LANCETS MISC      True Metrix Lancets, See Instructions, Use to check blood glucose daily, # 100 EA, 11 Refill(s), Pharmacy: [facility], 177.8, cm, Height/Length Dosing, 01/12/22 9:55:00 CST, 148.4, kg, Weight Dosing, 01/12/22 9:55:00 CST    METOPROLOL SUCCINATE (TOPROL-XL) 100 MG 24 HR TABLET    Take 1 tablet (100 mg total) by mouth once daily.    METOPROLOL SUCCINATE (TOPROL-XL) 50 MG 24 HR TABLET    Take 1 tablet (50 mg total) by mouth every evening.    POTASSIUM CHLORIDE SA (KLOR-CON M20) 20 MEQ TABLET    Take 1 tablet (20 mEq total) by mouth 2 (two) times daily.    PRAVASTATIN (PRAVACHOL) 80 MG TABLET    Take 1 tablet (80 mg total) by  mouth every evening.    TESTOSTERONE CYPIONATE (DEPOTESTOTERONE CYPIONATE) 200 MG/ML INJECTION    Inject 200 mg into the muscle every 14 (fourteen) days.    TORSEMIDE (DEMADEX) 20 MG TAB    Take 1 tablet (20 mg total) by mouth 2 (two) times a day.    XARELTO 20 MG TAB    Take 1 tablet (20 mg total) by mouth once daily.   Discontinued Medications    FLUTICASONE PROPIONATE (FLONASE) 50 MCG/ACTUATION NASAL SPRAY    1 spray (50 mcg total) by Each Nostril route once daily.         Allergies  Review of patient's allergies indicates:  No Known Allergies    Physical Examination     Vitals:    12/27/24 0806   BP: (!) 138/101   Pulse:    Resp:    Temp:      Physical Exam  Vitals and nursing note reviewed.   Constitutional:       General: He is not in acute distress.     Appearance: He is not ill-appearing.   HENT:      Head: Normocephalic and atraumatic.      Mouth/Throat:      Mouth: Mucous membranes are moist.      Pharynx: Oropharynx is clear.   Eyes:      General: No scleral icterus.     Extraocular Movements: Extraocular movements intact.      Conjunctiva/sclera: Conjunctivae normal.      Pupils: Pupils are equal, round, and reactive to light.   Neck:      Vascular: No carotid bruit.   Cardiovascular:      Rate and Rhythm: Normal rate and regular rhythm.      Heart sounds: No murmur heard.     No friction rub. No gallop.   Pulmonary:      Effort: Pulmonary effort is normal. No respiratory distress.      Breath sounds: Normal breath sounds. No wheezing, rhonchi or rales.   Abdominal:      General: Abdomen is flat. Bowel sounds are normal. There is no distension.      Palpations: Abdomen is soft. There is no mass.      Tenderness: There is no abdominal tenderness.   Musculoskeletal:         General: Normal range of motion.      Cervical back: Normal range of motion and neck supple.   Skin:     General: Skin is warm and dry.   Neurological:      General: No focal deficit present.      Mental Status: He is alert.    Psychiatric:         Mood and Affect: Mood normal.           Results     Lab Results   Component Value Date    WBC 12.66 (H) 11/18/2024    RBC 6.35 (H) 11/18/2024    HGB 18.3 (H) 11/18/2024    HCT 56.1 (H) 11/18/2024    MCV 88.3 11/18/2024    MCH 28.8 11/18/2024    MCHC 32.6 (L) 11/18/2024    RDW 18.8 (H) 11/18/2024     11/18/2024    MPV 11.6 (H) 11/18/2024     Sodium   Date Value Ref Range Status   11/18/2024 139 136 - 145 mmol/L Final     Potassium   Date Value Ref Range Status   11/18/2024 3.8 3.5 - 5.1 mmol/L Final     Chloride   Date Value Ref Range Status   11/18/2024 101 98 - 107 mmol/L Final     CO2   Date Value Ref Range Status   11/18/2024 27 23 - 31 mmol/L Final     Blood Urea Nitrogen   Date Value Ref Range Status   11/18/2024 24.2 8.4 - 25.7 mg/dL Final     Creatinine   Date Value Ref Range Status   11/18/2024 1.72 (H) 0.72 - 1.25 mg/dL Final     Calcium   Date Value Ref Range Status   11/18/2024 9.1 8.8 - 10.0 mg/dL Final     Albumin   Date Value Ref Range Status   11/18/2024 3.4 3.4 - 4.8 g/dL Final     Bilirubin Total   Date Value Ref Range Status   11/18/2024 0.6 <=1.5 mg/dL Final     ALP   Date Value Ref Range Status   11/18/2024 123 40 - 150 unit/L Final     AST   Date Value Ref Range Status   11/18/2024 30 5 - 34 unit/L Final     ALT   Date Value Ref Range Status   11/18/2024 22 0 - 55 unit/L Final     Estimated GFR-Non    Date Value Ref Range Status   01/12/2022 45 (L) >>=90 mL/min/1.73 m2 Final     Lab Results   Component Value Date    CHOL 182 11/18/2024     Lab Results   Component Value Date    HDL 42 11/18/2024     Lab Results   Component Value Date    TRIG 112 11/18/2024     Lab Results   Component Value Date    VLDL 22 11/18/2024     Lab Results   Component Value Date    .00 11/18/2024     Lab Results   Component Value Date    TSH 1.799 11/18/2024     Lab Results   Component Value Date    PHUR 6.0 11/18/2024    PROTEINUA 2+ (A) 11/18/2024    GLUCUA 4+ (A)  11/18/2024    KETONESU Negative 01/12/2022    OCCULTUA Negative 11/18/2024    NITRITE Negative 11/18/2024    LEUKOCYTESUR 25 (A) 11/18/2024     Lab Results   Component Value Date    HGBA1C 5.9 11/18/2024    HGBA1C 5.5 04/30/2024    HGBA1C 5.8 07/26/2023           Assessment         ICD-10-CM ICD-9-CM   1. Asymptomatic microscopic hematuria  R31.21 599.72   2. Type 2 diabetes mellitus with stage 3a chronic kidney disease, without long-term current use of insulin  E11.22 250.40    N18.31 585.3   3. Mixed hyperlipidemia  E78.2 272.2   4. Subclinical hyperthyroidism  E05.90 242.90   5. Stage 3a chronic kidney disease  N18.31 585.3   6. Primary hypertension  I10 401.9       Plan      Problem List Items Addressed This Visit       Hyperlipidemia (Chronic)    Current Assessment & Plan     FLP not at goal, medication recently increased following labs  Continue pravastatin 80mg  Stressed importance of dietary modifications. Follow a low cholesterol, low saturated fat diet with less that 200mg of cholesterol a day.  Avoid fried foods and high saturated fats (high saturated fats less than 7% of calories).  Add Flax Seed/Fish Oil supplements to diet. Increase dietary fiber.  Regular exercise can reduce LDL and raise HDL. Stressed importance of physical activity 5 times per week for 30 minutes per day.            Relevant Orders    Lipid Panel    Hypertension (Chronic)    Current Assessment & Plan     Elevated today, RTC 1 week nurse visit  Continue entresto, metoprolol, and hydralazine  Low Sodium Diet (DASH Diet - Less than 2 grams of sodium per day).  Monitor blood pressure daily and log. Report consistent numbers greater than 140/90.  Maintain healthy weight with goal BMI <30. Exercise 30 minutes per day, 5 days per week.  Smoking cessation encouraged to aid in BP reduction.           Type 2 diabetes mellitus with kidney complication, without long-term current use of insulin (Chronic)    Current Assessment & Plan     A1C at  goal- 5.9  Continue jardiance 10mg  Follow ADA Diet. Avoid soda, simple sweets, and limit rice/pasta/breads/starches (no more than 45-50 grams per meal).  Maintain healthy weight with goal BMI <30.  Exercise 5 times per week for 30 minutes per day.  Stressed importance of daily foot exams.  Stressed importance of annual dilated eye exam         Relevant Orders    Hemoglobin A1C    Comprehensive Metabolic Panel    Lipid Panel    Microalbumin/Creatinine Ratio, Urine    Subclinical hyperthyroidism    Current Assessment & Plan     TSH WNL  Repeat as ordered         Relevant Orders    TSH    T4, Free    Stage 3a chronic kidney disease (Chronic)    Current Assessment & Plan     Lab Results   Component Value Date    EGFRNORACEVR 43 11/18/2024    EGFRNORACEVR 44 06/14/2024     Stable   Follow up with nephrology as scheduled  Follow renoprotective measures including Renal Diet (reduce intake of nuts, peanut butter, milk, cheese, dried beans, peas) and Low Sodium Diet (less than 2 grams per day).  Avoid NSAIDs (Aleve, Mobic, Celebrex, Ibuprofen, Advil, Toradol and Diclofenac). May take Tylenol as needed for headache/pain.  Control DM with goal A1C <7. BP goal <130/80. LDL goal < 100.  Stay well hydrated. Avoid alcohol and soda. Limit tea and coffee.  Smoking Cessation recommended.           Asymptomatic microscopic hematuria - Primary    Current Assessment & Plan     CT abd pelvis  Smoking cessation  Report any new or worseing symptoms or guillermo hematuria         Relevant Orders    CT Abdomen Pelvis  Without Contrast       Future Appointments   Date Time Provider Department Center   12/31/2024  8:00 AM NURSE, [facility] INTERNAL MEDICINE [facility]ERTO Carrillo    2/14/2025  9:30 AM Nia Moreno MD [facility] NEPHR [facility]    3/19/2025  9:15 AM Christin Hartman FNP [facility]   6/25/2025  7:00 AM Yuliet Pickett FNP [facility]    10/14/2025  1:00 PM PROVIDER, [facility] PULMONOLOGY [facility] PULM  [facility]            Signature:      [facility]  [address] [facility] [address] [address]    Date of encounter: 12/27/24

## 2024-12-27 NOTE — ASSESSMENT & PLAN NOTE
Lab Results   Component Value Date    EGFRNORACEVR 43 11/18/2024    EGFRNORACEVR 44 06/14/2024     Stable   Follow up with nephrology as scheduled  Follow renoprotective measures including Renal Diet (reduce intake of nuts, peanut butter, milk, cheese, dried beans, peas) and Low Sodium Diet (less than 2 grams per day).  Avoid NSAIDs (Aleve, Mobic, Celebrex, Ibuprofen, Advil, Toradol and Diclofenac). May take Tylenol as needed for headache/pain.  Control DM with goal A1C <7. BP goal <130/80. LDL goal < 100.  Stay well hydrated. Avoid alcohol and soda. Limit tea and coffee.  Smoking Cessation recommended.

## 2024-12-27 NOTE — ASSESSMENT & PLAN NOTE
Elevated today, RTC 1 week nurse visit  Continue entresto, metoprolol, and hydralazine  Low Sodium Diet (DASH Diet - Less than 2 grams of sodium per day).  Monitor blood pressure daily and log. Report consistent numbers greater than 140/90.  Maintain healthy weight with goal BMI <30. Exercise 30 minutes per day, 5 days per week.  Smoking cessation encouraged to aid in BP reduction.

## 2025-01-02 ENCOUNTER — TELEPHONE (OUTPATIENT)
Dept: INTERNAL MEDICINE | Facility: CLINIC | Age: 65
End: 2025-01-02
Payer: COMMERCIAL

## 2025-01-02 NOTE — TELEPHONE ENCOUNTER
Returned call to pt. GEORGEM to return call to [facility] for further discussion.

## 2025-01-02 NOTE — TELEPHONE ENCOUNTER
----- Message from Madelaine sent at 1/2/2025 10:11 AM CST -----  .Who Called: Jamey Duke    Caller is requesting assistance/information from provider's office.    Symptoms (please be specific): n/a   How long has patient had these symptoms:  n/a  List of preferred pharmacies on file (remove unneeded): [unfilled]  If different, enter pharmacy into here including location and phone number: n/a      Preferred Method of Contact: Phone Call    Patient's Preferred Phone Number on File: [phone]     Best Call Back Number, if different:    Additional Information: Pt called confused regarding results he received from Neurologist and questions regarding his Testerone shot he was told to discontinue.  Pt is requesting a cb from nurse. Please advise, thank you.

## 2025-01-14 ENCOUNTER — HOSPITAL ENCOUNTER (OUTPATIENT)
Dept: RADIOLOGY | Facility: HOSPITAL | Age: 65
Discharge: HOME | End: 2025-01-14
Payer: COMMERCIAL

## 2025-01-14 ENCOUNTER — PATIENT MESSAGE (OUTPATIENT)
Dept: INTERNAL MEDICINE | Facility: CLINIC | Age: 65
End: 2025-01-14
Payer: COMMERCIAL

## 2025-01-14 DIAGNOSIS — R31.21 ASYMPTOMATIC MICROSCOPIC HEMATURIA: ICD-10-CM

## 2025-01-14 PROCEDURE — 74176 CT ABD & PELVIS W/O CONTRAST: CPT | Mod: TC

## 2025-02-06 ENCOUNTER — TELEPHONE (OUTPATIENT)
Dept: INTERNAL MEDICINE | Facility: CLINIC | Age: 65
End: 2025-02-06
Payer: COMMERCIAL

## 2025-02-06 NOTE — TELEPHONE ENCOUNTER
Pt called and is currently at [facility]. Maura De La Cruz NP at [facility] spoke with me stating they can no longer give pt his Testosterone due to elevated H/H. Mrs. Lorenzo stated she will fax over OV notes. Just an FYI.

## 2025-02-13 ENCOUNTER — PATIENT MESSAGE (OUTPATIENT)
Dept: NEPHROLOGY | Facility: CLINIC | Age: 65
End: 2025-02-13
Payer: COMMERCIAL

## 2025-02-14 ENCOUNTER — LAB VISIT (OUTPATIENT)
Dept: LAB | Facility: HOSPITAL | Age: 65
End: 2025-02-14
Payer: COMMERCIAL

## 2025-02-14 ENCOUNTER — OFFICE VISIT (OUTPATIENT)
Dept: NEPHROLOGY | Facility: CLINIC | Age: 65
End: 2025-02-14
Payer: COMMERCIAL

## 2025-02-14 VITALS
TEMPERATURE: 98 F | WEIGHT: 296.31 LBS | HEART RATE: 79 BPM | DIASTOLIC BLOOD PRESSURE: 83 MMHG | OXYGEN SATURATION: 100 % | HEIGHT: 70 IN | SYSTOLIC BLOOD PRESSURE: 115 MMHG | BODY MASS INDEX: 42.42 KG/M2 | RESPIRATION RATE: 18 BRPM

## 2025-02-14 DIAGNOSIS — R80.1 PERSISTENT PROTEINURIA: ICD-10-CM

## 2025-02-14 DIAGNOSIS — D75.1 POLYCYTHEMIA: Primary | ICD-10-CM

## 2025-02-14 DIAGNOSIS — N18.31 STAGE 3A CHRONIC KIDNEY DISEASE: ICD-10-CM

## 2025-02-14 DIAGNOSIS — K21.9 GASTROESOPHAGEAL REFLUX DISEASE, UNSPECIFIED WHETHER ESOPHAGITIS PRESENT: ICD-10-CM

## 2025-02-14 DIAGNOSIS — E78.2 MIXED HYPERLIPIDEMIA: Chronic | ICD-10-CM

## 2025-02-14 DIAGNOSIS — N25.81 SECONDARY HYPERPARATHYROIDISM OF RENAL ORIGIN: ICD-10-CM

## 2025-02-14 LAB
ALBUMIN SERPL-MCNC: 3.6 G/DL (ref 3.4–4.8)
ALBUMIN/GLOB SERPL: 0.8 RATIO (ref 1.1–2)
ALP SERPL-CCNC: 118 UNIT/L (ref 40–150)
ALT SERPL-CCNC: 24 UNIT/L (ref 0–55)
ANION GAP SERPL CALC-SCNC: 7 MEQ/L
AST SERPL-CCNC: 25 UNIT/L (ref 5–34)
BACTERIA #/AREA URNS AUTO: ABNORMAL /HPF
BASOPHILS # BLD AUTO: 0.05 X10(3)/MCL
BASOPHILS NFR BLD AUTO: 0.5 %
BILIRUB SERPL-MCNC: 0.5 MG/DL
BILIRUB UR QL STRIP.AUTO: NEGATIVE
BUN SERPL-MCNC: 30.4 MG/DL (ref 8.4–25.7)
CALCIUM SERPL-MCNC: 9.5 MG/DL (ref 8.8–10)
CHLORIDE SERPL-SCNC: 104 MMOL/L (ref 98–107)
CHOLEST SERPL-MCNC: 173 MG/DL
CHOLEST/HDLC SERPL: 5 {RATIO} (ref 0–5)
CLARITY UR: CLEAR
CO2 SERPL-SCNC: 27 MMOL/L (ref 23–31)
COLOR UR AUTO: ABNORMAL
CREAT SERPL-MCNC: 1.66 MG/DL (ref 0.72–1.25)
CREAT UR-MCNC: 35.4 MG/DL (ref 63–166)
CREAT/UREA NIT SERPL: 18
EOSINOPHIL # BLD AUTO: 0.2 X10(3)/MCL (ref 0–0.9)
EOSINOPHIL NFR BLD AUTO: 1.9 %
ERYTHROCYTE [DISTWIDTH] IN BLOOD BY AUTOMATED COUNT: 17.6 % (ref 11.5–17)
GFR SERPLBLD CREATININE-BSD FMLA CKD-EPI: 45 ML/MIN/1.73/M2
GLOBULIN SER-MCNC: 4.8 GM/DL (ref 2.4–3.5)
GLUCOSE SERPL-MCNC: 130 MG/DL (ref 82–115)
GLUCOSE UR QL STRIP: ABNORMAL
HCT VFR BLD AUTO: 57.1 % (ref 42–52)
HDLC SERPL-MCNC: 36 MG/DL (ref 35–60)
HGB BLD-MCNC: 18.3 G/DL (ref 14–18)
HGB UR QL STRIP: NEGATIVE
HYALINE CASTS #/AREA URNS LPF: ABNORMAL /LPF
IMM GRANULOCYTES # BLD AUTO: 0.05 X10(3)/MCL (ref 0–0.04)
IMM GRANULOCYTES NFR BLD AUTO: 0.5 %
KETONES UR QL STRIP: NEGATIVE
LDLC SERPL CALC-MCNC: 106 MG/DL (ref 50–140)
LEUKOCYTE ESTERASE UR QL STRIP: NEGATIVE
LYMPHOCYTES # BLD AUTO: 2.33 X10(3)/MCL (ref 0.6–4.6)
LYMPHOCYTES NFR BLD AUTO: 21.6 %
MAGNESIUM SERPL-MCNC: 2.3 MG/DL (ref 1.6–2.6)
MCH RBC QN AUTO: 28.6 PG (ref 27–31)
MCHC RBC AUTO-ENTMCNC: 32 G/DL (ref 33–36)
MCV RBC AUTO: 89.2 FL (ref 80–94)
MONOCYTES # BLD AUTO: 1.02 X10(3)/MCL (ref 0.1–1.3)
MONOCYTES NFR BLD AUTO: 9.5 %
MUCOUS THREADS URNS QL MICRO: ABNORMAL /LPF
NEUTROPHILS # BLD AUTO: 7.14 X10(3)/MCL (ref 2.1–9.2)
NEUTROPHILS NFR BLD AUTO: 66 %
NITRITE UR QL STRIP: NEGATIVE
NRBC BLD AUTO-RTO: 0 %
PH UR STRIP: 6.5 [PH]
PHOSPHATE SERPL-MCNC: 3.2 MG/DL (ref 2.3–4.7)
PLATELET # BLD AUTO: 155 X10(3)/MCL (ref 130–400)
PLATELETS.RETICULATED NFR BLD AUTO: 12.8 % (ref 0.9–11.2)
PMV BLD AUTO: ABNORMAL FL
POTASSIUM SERPL-SCNC: 4.6 MMOL/L (ref 3.5–5.1)
PROT SERPL-MCNC: 8.4 GM/DL (ref 5.8–7.6)
PROT UR QL STRIP: ABNORMAL
PROT UR STRIP-MCNC: 39.5 MG/DL
PTH-INTACT SERPL-MCNC: 192.4 PG/ML (ref 8.7–77)
RBC # BLD AUTO: 6.4 X10(6)/MCL (ref 4.7–6.1)
RBC #/AREA URNS AUTO: ABNORMAL /HPF
SODIUM SERPL-SCNC: 138 MMOL/L (ref 136–145)
SP GR UR STRIP.AUTO: 1 (ref 1–1.03)
SQUAMOUS #/AREA URNS LPF: ABNORMAL /HPF
TRIGL SERPL-MCNC: 153 MG/DL (ref 34–140)
URINE PROTEIN/CREATININE RATIO (OLG): 1.1
UROBILINOGEN UR STRIP-ACNC: NORMAL
VLDLC SERPL CALC-MCNC: 31 MG/DL
WBC # BLD AUTO: 10.79 X10(3)/MCL (ref 4.5–11.5)
WBC #/AREA URNS AUTO: ABNORMAL /HPF

## 2025-02-14 PROCEDURE — 80061 LIPID PANEL: CPT

## 2025-02-14 PROCEDURE — 85025 COMPLETE CBC W/AUTO DIFF WBC: CPT

## 2025-02-14 PROCEDURE — 83970 ASSAY OF PARATHORMONE: CPT

## 2025-02-14 PROCEDURE — 36415 COLL VENOUS BLD VENIPUNCTURE: CPT

## 2025-02-14 PROCEDURE — 99215 OFFICE O/P EST HI 40 MIN: CPT | Mod: PBBFAC | Performed by: INTERNAL MEDICINE

## 2025-02-14 PROCEDURE — 81015 MICROSCOPIC EXAM OF URINE: CPT

## 2025-02-14 PROCEDURE — 84100 ASSAY OF PHOSPHORUS: CPT

## 2025-02-14 PROCEDURE — 80053 COMPREHEN METABOLIC PANEL: CPT

## 2025-02-14 PROCEDURE — 83735 ASSAY OF MAGNESIUM: CPT

## 2025-02-14 PROCEDURE — 84156 ASSAY OF PROTEIN URINE: CPT

## 2025-02-14 RX ORDER — CALCITRIOL 0.25 UG/1
0.25 CAPSULE ORAL
Qty: 12 CAPSULE | Refills: 5 | Status: SHIPPED | OUTPATIENT
Start: 2025-02-14 | End: 2025-08-13

## 2025-02-14 RX ORDER — DAPAGLIFLOZIN 5 MG/1
5 TABLET, FILM COATED ORAL DAILY
COMMUNITY
Start: 2024-06-07 | End: 2025-02-14

## 2025-02-14 RX ORDER — FAMOTIDINE 20 MG/1
20 TABLET, FILM COATED ORAL 2 TIMES DAILY PRN
Qty: 30 TABLET | Refills: 6 | Status: SHIPPED | OUTPATIENT
Start: 2025-02-14 | End: 2025-05-30

## 2025-02-14 NOTE — PROGRESS NOTES
[facility] Telemedicine Note    Chief Complaint   Patient presents with    Follow-up     RTC, took meds, w/o complaints       History of Present Illness    This is a 63 y.o. male patient with past medical history of HTN, HLD, DM, CKD 3a, NICM, HFrEF (EF 40%), and persistent proteinuria, who is following up for persistent proteinuria.     Following Cardiology for mgmt of HFrEF, Afib and HTN. Increased Pravastatin to 80mg now. BP at goal today 115/83 . Home BP this morning 138/72. Continue with current regimen: Entresto 46/51 mg BID, torsemide 20 mg BID, hydralazine 50 mg BID, metoprolol succinate 100 mg qam, metoprolol succinate 50 mg qhs, Jardiance 10mg qd, Xarelto 20 mg p.o. daily.     Renal indices remain stable BUN/Cr 30.4/1.66 with eGFR of 45. Electrolytes stable today. On supplemental potassium 20 mEq BID. PTH remains elevated compared to 2 years ago.    Reports drinking about 4-5 16oz of water bottle daily. Denies dysuria, hematuria, pruritus, rashes, leg swelling. Denies hypotensive episode at home. Patient has been having episodic indigestion about 2 years ago with certain foods like banana or tomato sauce. Has been running out Pepcid for 3 weeks. Otherwise, denies any other acute complains. other    Review of Systems  Twelve point review of systems conducted, negative except as stated in history of present illness.    Review of patient's allergies indicates:  No Known Allergies      Past Medical History:   Past Medical History:   Diagnosis Date    Atrial flutter     Diabetes mellitus     Hypercholesteremia     Hypertension     Morbid obesity     Obese     Tobacco user     Type 2 diabetes mellitus without complications        Procedure History:   Past Surgical History:   Procedure Laterality Date    ANGIOGRAM, CORONARY, WITH LEFT HEART CATHETERIZATION N/A 07/29/2022    Procedure: Angiogram, Coronary, with Left Heart Cath;  Surgeon: Yossi Zuniga MD;  Location: [facility] CATH LAB;  Service:  Cardiology;  Laterality: N/A;    Cardioversion x 2      COLONOSCOPY  10/24/2023    Comprehensive electrophysiologic eval. including insertion & repositioning of multiple electrode catheters with induction or attempted induction of arrhythmias with left atrial pacing & recordingfrom coronary sinus or left atrium  07/10/2018    comprehensive electrophysiologic eval. insertion & repositioning multiple electrode catheters with induction or attempted induction of an arrhythmia with right atrial pacing and recording, right ventricular pacing and recording  07/10/2018    Destruction of Conduction Mechanism, Percutaneous Approach  07/10/2018       Family History: family history includes Hypertension in his mother; Valvular heart disease in his father.    Social History:  reports that he has been smoking cigars. He has been exposed to tobacco smoke. He has never used smokeless tobacco. He reports current alcohol use of about 6.0 standard drinks of alcohol per week. He reports that he does not use drugs.    Physical Exam:   Unable to do - given phone interview    Home Medications:  Prior to Admission medications    Medication Sig Start Date End Date Taking? Authorizing Provider   blood sugar diagnostic Strp Inject 1 strip into the skin 2 (two) times a day. 5/12/22  Yes Geetha Pickens FNP   blood-glucose meter kit   True Metrix Glucometer, See Instructions, Use to check blood glucose daily, # 1 EA, 0 Refill(s), Pharmacy: [facility], 177.8, cm, Height/Length Dosing, 01/12/22 9:55:00 CST, 148.4, kg, Weight Dosing, 01/12/22 9:55:00 CST 1/14/22  Yes Provider, Historical   EASY TOUCH TWIST LANCETS 33 gauge Misc USE AS DIRECTED TO CHECK BLOOD GLUCOSE DAILY 1/14/22  Yes Provider, Historical   empagliflozin (JARDIANCE) 10 mg tablet Take 1 tablet (10 mg total) by mouth once daily. 9/15/23 9/14/24 Yes Christin Hartman FNP   furosemide (LASIX) 40 MG tablet Take 1 tablet (40 mg total) by mouth 2 (two) times  daily. 3/14/23 3/13/24 Yes Devan Laterica L, FNP   hydrALAZINE (APRESOLINE) 50 MG tablet Take 1 tablet (50 mg total) by mouth 2 (two) times daily. 3/14/23 3/13/24 Yes Devan, Laterica L, FNP   KLOR-CON M20 20 mEq tablet Take 1 tablet (20 mEq total) by mouth once daily. 3/14/23 3/13/24 Yes Devan Laterica L, FNP   LANCETS MISC   True Metrix Lancets, See Instructions, Use to check blood glucose daily, # 100 EA, 11 Refill(s), Pharm[facility]: [facility], 177.8, cm, Height/Length Dosing, 01/12/22 9:55:00 CST, 148.4, kg, Weight Dosing, 01/12/22 9:55:00 CST 1/14/22  Yes Provider, Historical   metFORMIN (GLUCOPHAGE-XR) 500 MG ER 24hr tablet Take 1 tablet (500 mg total) by mouth once daily. 1/26/23  Yes Geetha Pickens FNP   metoprolol succinate (TOPROL-XL) 50 MG 24 hr tablet Take 1 tablet (50 mg total) by mouth once daily. 3/14/23 3/13/24 Yes Santino Hartmanica L, FNP   pravastatin (PRAVACHOL) 40 MG tablet Take 1 tablet (40 mg total) by mouth every evening. 3/14/23 3/13/24 Yes Devan Laterica L, FNP   sacubitriL-valsartan (ENTRESTO) 49-51 mg per tablet Take 1 tablet by mouth 2 (two) times daily. 12/11/23 12/10/24 Yes Devan Laterica L, FNP   sildenafiL (VIAGRA) 50 MG tablet Take 1 tablet (50 mg total) by mouth daily as needed for Erectile Dysfunction. 9/5/23 9/4/24 Yes Yossi Abreu NP   XARELTO 20 mg Tab Take 1 tablet (20 mg total) by mouth once daily. 9/15/23 9/14/24 Yes Devan Laterica L, FNP       Impression:   Problem List Items Addressed This Visit       Stage 3a chronic kidney disease (Chronic)    Relevant Orders    Vitamin D    PTH, Intact    Protein / creatinine ratio, urine    CBC Auto Differential    Urinalysis    PTH, Intact    Magnesium    Phosphorus     Other Visit Diagnoses       Polycythemia    -  Primary    Relevant Orders    Ambulatory referral/consult to Hematology / Oncology    Gastroesophageal reflux disease, unspecified whether esophagitis present        Relevant Medications     famotidine (PEPCID) 20 MG tablet    Secondary hyperparathyroidism of renal origin        Relevant Medications    calcitRIOL (ROCALTROL) 0.25 MCG Cap                Plan:   - Deferring BP medication adjust to cardiology, continue current medications. Recommend BP log   - A1C 5.9 on 11/18/2024. Continue Jardiance 10mg qd. Metformin d/c. Protein/Cr Ratio wnl. Proteinuria likely secondary to diabetic nephropathy  - Previous labs including MM, MGUS, UPC, SPEP, XIANG, ANCA, Renal US unrevealing  - Renal indices remain stable BUN/Cr of 30.4/1.66, eGFR of 45  - PTH remains elevated compared to 2 years ago at 192. Initiate Calcitriol 0.25 mcg three times weekly  - Hgb 18.3. Reported not a daily smoker. Referral to Heme/Onc sent for polycythemia  - Repeat routine labs prior to next visit    Medication review has been conducted, appropriate adjustments for decreased e-GFR have been made.     Continue following measures:  -follow 2 gram a day dietary sodium restriction  -control diabetes (goal A1c less than 7%)  -control high blood pressure (goal blood pressure is less than 130/80, please check blood pressure twice a week and bring blood pressure logs to office visit)  -exercise at least 30 minutes a day, 5 days a week  -maintain healthy weight  -decrease or stop alcohol use  -do not smoke  -stay well hydrated (drink water only, avoid juices, sweet tea, and sodas)  -ask about staying up-to-date on vaccinations (flu vaccine, pneumonia vaccine, hepatitis B vaccine)  -avoid excessive use of NSAIDs (ibuprofen, naproxen, Aleve, Advil, Toradol, Mobic), take Tylenol as needed for headache or mild pain  -take cholesterol lowering medications if prescribed (LDL goal less than 100)    Follow-up with the primary care provider as scheduled.  Return to [facility] (kidney) clinic with routine labs in 4 months       Tashia Valera DO  [facility] Internal Medicine PGY-I  [facility]

## 2025-02-18 ENCOUNTER — TELEPHONE (OUTPATIENT)
Dept: HEMATOLOGY/ONCOLOGY | Facility: CLINIC | Age: 65
End: 2025-02-18
Payer: COMMERCIAL

## 2025-02-18 NOTE — TELEPHONE ENCOUNTER
Good morning, received referral for hematology. All hematology referrals require two recent sets of labs ranging from 2 weeks to one month apart. Labs need to include both CBC and CMP. Are you able to order more labs?      ESTIVEN Vega  Hem/Onc

## 2025-03-19 ENCOUNTER — OFFICE VISIT (OUTPATIENT)
Dept: CARDIOLOGY | Facility: CLINIC | Age: 65
End: 2025-03-19
Payer: COMMERCIAL

## 2025-03-19 VITALS
OXYGEN SATURATION: 100 % | HEIGHT: 70 IN | SYSTOLIC BLOOD PRESSURE: 115 MMHG | BODY MASS INDEX: 41.8 KG/M2 | WEIGHT: 292 LBS | RESPIRATION RATE: 20 BRPM | HEART RATE: 84 BPM | TEMPERATURE: 99 F | DIASTOLIC BLOOD PRESSURE: 86 MMHG

## 2025-03-19 DIAGNOSIS — I48.19 PERSISTENT ATRIAL FIBRILLATION: ICD-10-CM

## 2025-03-19 DIAGNOSIS — G47.33 OSA ON CPAP: ICD-10-CM

## 2025-03-19 DIAGNOSIS — E78.2 MIXED HYPERLIPIDEMIA: Chronic | ICD-10-CM

## 2025-03-19 DIAGNOSIS — I10 PRIMARY HYPERTENSION: Chronic | ICD-10-CM

## 2025-03-19 DIAGNOSIS — I48.92 ATRIAL FLUTTER, UNSPECIFIED TYPE: Chronic | ICD-10-CM

## 2025-03-19 DIAGNOSIS — Z72.0 TOBACCO USER: Chronic | ICD-10-CM

## 2025-03-19 DIAGNOSIS — I42.8 NICM (NONISCHEMIC CARDIOMYOPATHY): Primary | ICD-10-CM

## 2025-03-19 PROCEDURE — 3074F SYST BP LT 130 MM HG: CPT | Mod: CPTII,,, | Performed by: NURSE PRACTITIONER

## 2025-03-19 PROCEDURE — 3008F BODY MASS INDEX DOCD: CPT | Mod: CPTII,,, | Performed by: NURSE PRACTITIONER

## 2025-03-19 PROCEDURE — 3288F FALL RISK ASSESSMENT DOCD: CPT | Mod: CPTII,,, | Performed by: NURSE PRACTITIONER

## 2025-03-19 PROCEDURE — 3066F NEPHROPATHY DOC TX: CPT | Mod: CPTII,,, | Performed by: NURSE PRACTITIONER

## 2025-03-19 PROCEDURE — 1101F PT FALLS ASSESS-DOCD LE1/YR: CPT | Mod: CPTII,,, | Performed by: NURSE PRACTITIONER

## 2025-03-19 PROCEDURE — 1160F RVW MEDS BY RX/DR IN RCRD: CPT | Mod: CPTII,,, | Performed by: NURSE PRACTITIONER

## 2025-03-19 PROCEDURE — 99215 OFFICE O/P EST HI 40 MIN: CPT | Mod: PBBFAC | Performed by: NURSE PRACTITIONER

## 2025-03-19 PROCEDURE — 99214 OFFICE O/P EST MOD 30 MIN: CPT | Mod: S$PBB,,, | Performed by: NURSE PRACTITIONER

## 2025-03-19 PROCEDURE — 1159F MED LIST DOCD IN RCRD: CPT | Mod: CPTII,,, | Performed by: NURSE PRACTITIONER

## 2025-03-19 PROCEDURE — 3079F DIAST BP 80-89 MM HG: CPT | Mod: CPTII,,, | Performed by: NURSE PRACTITIONER

## 2025-03-19 RX ORDER — HYDRALAZINE HYDROCHLORIDE 50 MG/1
50 TABLET, FILM COATED ORAL 2 TIMES DAILY
Qty: 180 TABLET | Refills: 3 | Status: SHIPPED | OUTPATIENT
Start: 2025-03-19 | End: 2026-03-19

## 2025-03-19 NOTE — PROGRESS NOTES
CHIEF COMPLAINT:   Chief Complaint   Patient presents with    Follow-up     Denies any cardiac problems                   Review of patient's allergies indicates:  No Known Allergies                                       HPI:  Jamey Duke 64 y.o. male with  NICMO (EF- 40% per ECHO 4.26.24 ), H/o persistent AF/AFL (s/p multiple attempted DCCVs but has had recurrence of AFL)(on Xarelto), Carotid artery stenosis, HTN, HLD, DM II, SHADI/CPAP, and tobacco use (cigars)  who presents to cardiology clinic for routine follow up and ongoing care.  He is a former patient of Dr. Gutierrez and Dr. Syed.   Latest Echocardiogram obtained on 4.26.24 revealed an EF of 40%, indeterminate diastolic function, and no significant valvular abnormalities, which is stable from previous EF of 40% per ECHO Jan. 2023.  Most recent ischemic evaluation includes a Left Heart Catheterization on 7.29.22 that revealed normal coronary arteries. Notably, the patient underwent a previous LHC in 2017 that revealed normal coronary arteries. (See Reports Below).     Today the patient reports that he feels good.  However, he continues to endorse occasional episodes of shortness of breath with over exertion only.  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  He also rides his exercise bike at least 4 times a week (or greater ) for about 30 minutes without cardiac symptoms.  The patient appears euvolemic on exam without any overt signs or symptoms of volume overload.  He denies any further cardiac complaints of chest pain, palpitations, orthopnea, PND, peripheral edema, claudication, lightheadedness, dizziness, near-syncope or syncope.  Patient reportedly obtained CPAP and is benefitting from use.  He continues to occasionally smoke a cigar socially.        PMH: HTN, HLD, h/o persistent AF/AFL, and h/o NICMP  PSH:  ablation  Social History: Denies any illicit drug, tobacco use-smokes occasional cigar . Reports occasional ETOH use  Family History: Father -leaky valve    CARDIAC TESTING:    ECHO 4.26.24       Technically difficult study. Optison contrast used.    Atrial fibrillation observed.    Left Ventricle: The left ventricle is normal in size. Moderate global hypokinesis present. There is moderately reduced systolic function. Biplane (2D) method of discs ejection fraction is 40%. There is indeterminate diastolic function.    Right Ventricle: Right ventricle was not well visualized due to poor acoustic window.    Left Atrium: Left atrium is moderately dilated. The left atrium volume index MOD is 42.6 mL/m2.    Aortic Valve: The aortic valve is a trileaflet valve. There is no stenosis. There is no significant regurgitation.    Mitral Valve: There is no stenosis. There is no significant regurgitation.    Tricuspid Valve: There is no significant regurgitation.    Pulmonic Valve: There is no significant regurgitation.    Aorta: Aortic root is normal in size measuring 3.4 cm.    IVC/SVC: Intermediate venous pressure at 8 mmHg.       ECHO 1.30.23  The left ventricle is normal in size with mildly decreased systolic function.  Normal right ventricular size with normal right ventricular systolic function.  The estimated ejection fraction is 40%.  Moderate right atrial enlargement.  Mild mitral regurgitation.  Atrial fibrillation observed.  There is mild to moderate pulmonary hypertension.  Moderate left atrial enlargement.    Cardiac catheterization 7.29.22    Conclusion  · The pre-procedure left ventricular end diastolic pressure was 22.  · The estimated blood loss was none.  · The coronary arteries were normal..    FINDINGS  The patient has No CAD  Blood loss:  less than 15 cc.    RECOMMENDATIONS  Medical management -- maximize GDMT  Risk factor modifications -- smoking cessation  Activity -- avoid straining with affected limb for  one week  Exercise on regular basis.    Spectralmindiscan stress test 6.20.22       Abnormal myocardial perfusion scan.    There are two significant perfusion abnormalities.    Perfusion Abnormality #1 - There is a moderate to severe intensity, moderate to large sized, reversible perfusion abnormality that is consistent with ischemia in the basal to apical lateral apical wall(s) in the typical distribution of the LCX territory.    Perfusion Abnormality #2 - There is a small to moderate sized, moderate intensity, reversible perfusion abnormality that is consistent with ischemia in the basal to mid anterior wall(s) in the typical distribution of the LAD territory.    There are no other significant perfusion abnormalities.    The gated perfusion images showed an ejection fraction of 36% at rest. The gated perfusion images showed an ejection fraction of 33% post stress.    The EKG portion of this study is abnormal but not diagnostic.    The patient reported no chest pain during the stress test.    There were no arrhythmias during stress.     If clinically indicated, consider further evaluation with coronary angiogram.       ECHO 5.27.22     The left ventricle is normal in size with moderately decreased systolic function.  The estimated ejection fraction is 35%.  Atrial fibrillation observed.  Mild mitral regurgitation.  Mild tricuspid regurgitation.  IVC is dilated.IVC collapses >50%.  Intermediate central venous pressure (8 mmHg).  The estimated PA systolic pressure is 37 mmHg.  Normal right ventricular size with normal right ventricular systolic function.  There is no pulmonary hypertension.       ECHO 1.24.20    1. The study quality is below average.  2. The left ventricle is normal in size. Global left ventricular systolic function is mildly decreased. The left ventricular ejection fraction is 45%.  3. The left atrial diameter is mildly increased. The right atrium is mildly enlarged.  4. Mild (1+) mitral regurgitation. Mild  (1+) tricuspid regurgitation.  5. The pulmonary artery systolic pressure is 35 mmHg.     PET 12.6.18   This is a normal perfusion study, no perfusion defects noted.   This scan is suggestive of low risk for future cardiovascular events.   The left ventricular cavity is noted to be moderately enlarged on the stress studies. The stress left ventricular ejection fraction was calculated to be 45% and left ventricular global function is mildly reduced. The rest left ventricular cavity is noted to be mildly enlarged. The rest left ventricular ejection fraction was calculated to be 39% and rest left ventricular global function is moderately reduced.   When compared to the resting ejection fraction (39%), the stress ejection fraction (45%) has increased.   The study quality is good.    Carotid US 2.9.18  1. The study quality is below average.  2. 1-39% stenosis in the proximal right internal carotid artery based on Bluth Criteria.  3. Antegrade right vertebral artery flow.  4. Antegrade left vertebral artery flow    [facility] 1.3.17  EF -20%  Normal coronaries                                                                                                                                                                                                                                                                                                      Patient Active Problem List   Diagnosis    Atrial flutter    Hyperlipidemia    Hypertension    Type 2 diabetes mellitus with kidney complication, without long-term current use of insulin    Tobacco user    Subclinical hyperthyroidism    Other male erectile dysfunction    AMD (age-related macular degeneration), bilateral    Stage 3a chronic kidney disease    Persistent proteinuria    Chronic pain of both knees    SHADI on CPAP    Non-seasonal allergic rhinitis due to pollen    Atrial fibrillation    Dyspnea    NICM (nonischemic cardiomyopathy)    Asymptomatic microscopic hematuria     Past  Surgical History:   Procedure Laterality Date    ANGIOGRAM, CORONARY, WITH LEFT HEART CATHETERIZATION N/A 07/29/2022    Procedure: Angiogram, Coronary, with Left Heart Cath;  Surgeon: Yossi Zuniga MD;  Location: [facility] CATH LAB;  Service: Cardiology;  Laterality: N/A;    Cardioversion x 2      COLONOSCOPY  10/24/2023    Comprehensive electrophysiologic eval. including insertion & repositioning of multiple electrode catheters with induction or attempted induction of arrhythmias with left atrial pacing & recordingfrom coronary sinus or left atrium  07/10/2018    comprehensive electrophysiologic eval. insertion & repositioning multiple electrode catheters with induction or attempted induction of an arrhythmia with right atrial pacing and recording, right ventricular pacing and recording  07/10/2018    Destruction of Conduction Mechanism, Percutaneous Approach  07/10/2018     Social History     Socioeconomic History    Marital status: Single    Number of children: 0   Occupational History    Occupation: retired   Tobacco Use    Smoking status: Some Days     Types: Cigars     Passive exposure: Current    Smokeless tobacco: Never    Tobacco comments:     Occasionally, last smoked 9/30/24   Substance and Sexual Activity    Alcohol use: Yes     Alcohol/week: 6.0 standard drinks of alcohol     Types: 6 Cans of beer per week     Comment: Drinks occasionally beer on weekends    Drug use: Never    Sexual activity: Yes     Partners: Female     Social Drivers of Health     Financial Resource Strain: Low Risk  (12/27/2024)    Overall Financial Resource Strain (CARDIA)     Difficulty of Paying Living Expenses: Not hard at all   Food Insecurity: No Food Insecurity (12/27/2024)    Hunger Vital Sign     Worried About Running Out of Food in the Last Year: Never true     Ran Out of Food in the Last Year: Never true   Transportation Needs: No Transportation Needs (12/27/2024)    TRANSPORTATION NEEDS     Transportation : No   Physical  Activity: Sufficiently Active (12/27/2024)    Exercise Vital Sign     Days of Exercise per Week: 7 days     Minutes of Exercise per Session: 30 min   Stress: No Stress Concern Present (12/27/2024)    Citizen of [place] [employer] - Occupational Stress Questionnaire     Feeling of Stress : Not at all   Housing Stability: Unknown (12/27/2024)    Housing Stability Vital Sign     Unable to Pay for Housing in the Last Year: No     Homeless in the Last Year: No        Family History   Problem Relation Name Age of Onset    Hypertension Mother      Valvular heart disease Father           Current Outpatient Medications:     calcitRIOL (ROCALTROL) 0.25 MCG Cap, Take 1 capsule (0.25 mcg total) by mouth 3 (three) times a week., Disp: 12 capsule, Rfl: 5    cetirizine (ZYRTEC) 10 MG tablet, TAKE 1 TABLET BY MOUTH nightly AS NEEDED FOR allergies OR rhinitis, Disp: 30 tablet, Rfl: 3    famotidine (PEPCID) 20 MG tablet, Take 1 tablet (20 mg total) by mouth 2 (two) times daily as needed for Heartburn., Disp: 30 tablet, Rfl: 6    hydrALAZINE (APRESOLINE) 50 MG tablet, Take 1 tablet (50 mg total) by mouth 2 (two) times daily., Disp: 180 tablet, Rfl: 3    metoprolol succinate (TOPROL-XL) 100 MG 24 hr tablet, Take 1 tablet (100 mg total) by mouth once daily., Disp: 90 tablet, Rfl: 3    metoprolol succinate (TOPROL-XL) 50 MG 24 hr tablet, Take 1 tablet (50 mg total) by mouth every evening., Disp: 90 tablet, Rfl: 2    potassium chloride SA (KLOR-CON M20) 20 MEQ tablet, Take 1 tablet (20 mEq total) by mouth 2 (two) times daily., Disp: 90 tablet, Rfl: 3    pravastatin (PRAVACHOL) 80 MG tablet, Take 1 tablet (80 mg total) by mouth every evening., Disp: 90 tablet, Rfl: 3    torsemide (DEMADEX) 20 MG Tab, Take 1 tablet (20 mg total) by mouth 2 (two) times a day., Disp: 180 tablet, Rfl: 3    XARELTO 20 mg Tab, Take 1 tablet (20 mg total) by mouth once daily., Disp: 90 tablet, Rfl: 3    blood sugar diagnostic Strp, Inject 1 strip into  "the skin 2 (two) times a day., Disp: 100 strip, Rfl: 11    blood-glucose meter kit,  True Metrix Glucometer, See Instructions, Use to check blood glucose daily, # 1 EA, 0 Refill(s), Pharmacy: [facility], 177.8, cm, Height/Length Dosing, 01/12/22 9:55:00 CST, 148.4, kg, Weight Dosing, 01/12/22 9:55:00 CST, Disp: , Rfl:     EASY TOUCH TWIST LANCETS 33 gauge Misc, USE AS DIRECTED TO CHECK BLOOD GLUCOSE DAILY, Disp: , Rfl:     empagliflozin (JARDIANCE) 10 mg tablet, Take 1 tablet (10 mg total) by mouth once daily. (Patient not taking: Reported on 3/19/2025), Disp: 30 tablet, Rfl: 11    LANCETS MISC,  True Metrix Lancets, See Instructions, Use to check blood glucose daily, # 100 EA, 11 Refill(s), Pharmacy: [facility], 177.8, cm, Height/Length Dosing, 01/12/22 9:55:00 CST, 148.4, kg, Weight Dosing, 01/12/22 9:55:00 CST, Disp: , Rfl:      ROS:                                                                                                                                                                             Review of Systems   Constitutional: Negative.    HENT: Negative.     Eyes: Negative.    Respiratory:  Positive for shortness of breath.    Cardiovascular:  Positive for palpitations. Negative for chest pain.   Gastrointestinal: Negative.    Genitourinary: Negative.    Musculoskeletal: Negative.    Skin: Negative.    Neurological: Negative.    Endo/Heme/Allergies: Negative.    Psychiatric/Behavioral: Negative.          Blood pressure 115/86, pulse 84, temperature 99 °F (37.2 °C), temperature source Oral, resp. rate 20, height 5' 10" (1.778 m), weight 132.5 kg (292 lb), SpO2 100%.   PE:  Physical Exam  Constitutional:       Appearance: Normal appearance.   HENT:      Head: Normocephalic.   Eyes:      Pupils: Pupils are equal, round, and reactive to light.   Cardiovascular:      Rate and Rhythm: Normal rate and regular rhythm.      Pulses: Normal pulses.   Pulmonary:      " Effort: Pulmonary effort is normal.   Musculoskeletal:         General: Normal range of motion.   Skin:     General: Skin is warm and dry.   Neurological:      General: No focal deficit present.      Mental Status: He is alert and oriented to person, place, and time.   Psychiatric:         Mood and Affect: Mood normal.         Behavior: Behavior normal.                ASSESSMENT/PLAN:  NICMP/Reduced EF NYHA Class II - III  - EF- 40%, indeterminate diastolic function per ECHO 4.26.24  EF - 40% per ECHO 1.30.23 improved from -->35% per ECHO 5.27.22 --> 45% per ECHO (1.24.20)  - [facility]- normal coronaries (7.29.22)  - Lexiscan  stress test - to reversible abnormalities ischemic defects (6.20.22)  - [facility]- EF-20%, Normal coronaries (1.3.17)  - Reports SOB with over exertion only. ADLs and routine activities unchanged   - Euvolemic upon exam, warm and dry  - Continue GDMT: metoprolol succinate 100 mg q am and metoprolol succinate 50 mg qhs, Entresto- 49/51 mg BID and Jardiance 10 mg  - Continue Torsemide 20 mg BID.  On supplemental potassium 20 mEq BID. as well.  Electrolyte stable     - Counseled patient on low-sodium diet and s/s of volume overload      H/o persistent AF/AFL Status post RFA on (7.10.18)   - Has multiple attempted DCCVs but has had recurrence of AFL  - Denies palpitations  - Rate Control: Continue metoprolol succinate 100 mg qam and metoprolol succinate 50mg qhs    - Anticoagulation: and Xarelto 20 mg p.o. daily. Denies any adverse bleeding issues      HTN  - BP at goal 115/86  - Continue Entresto 46/51 mg BID, torsemide 20 mg BID, hydralazine 50 mg BID, metoprolol succinate 100 mg qam, and metoprolol succinate 50 mg qhs  - Counseled on low-sodium diet and exercise as tolerated      Diabetes Mellitus Type II   - A1C- 5.9  - Continue management per PCP     HLD  - LDL -106, above goal <70/DM   - Continue Pravastatin 80 mg  - Counseled patient on low-cholesterol, low-fat diet, and encourage exercise as  tolerated    Carotid Artery disease- Asymptomatic   - 1-39% stenosis in the proximal right internal carotid artery based on Bluth Criteria. (2018)    Tobacco use  - Reports occasional cigar use.  Socially  - Counseled on importance of smoking cessation    SHADI/CPAP  - Reports compliance with CPAP and feels he is benefitting from use  - Recommend continued nightly compliance      Follow-up in cardiology clinic in 5 months or sooner if needed   Follow up with PCP as directed

## 2025-04-09 DIAGNOSIS — I48.0 PAROXYSMAL ATRIAL FIBRILLATION: ICD-10-CM

## 2025-04-09 DIAGNOSIS — I48.92 ATRIAL FLUTTER, UNSPECIFIED TYPE: ICD-10-CM

## 2025-04-09 DIAGNOSIS — I42.8 NICM (NONISCHEMIC CARDIOMYOPATHY): ICD-10-CM

## 2025-04-09 DIAGNOSIS — I10 PRIMARY HYPERTENSION: Chronic | ICD-10-CM

## 2025-04-09 DIAGNOSIS — E78.2 MIXED HYPERLIPIDEMIA: Chronic | ICD-10-CM

## 2025-04-09 DIAGNOSIS — I50.20 HFREF (HEART FAILURE WITH REDUCED EJECTION FRACTION): ICD-10-CM

## 2025-04-09 RX ORDER — METOPROLOL SUCCINATE 50 MG/1
50 TABLET, EXTENDED RELEASE ORAL NIGHTLY
Qty: 90 TABLET | Refills: 3 | Status: SHIPPED | OUTPATIENT
Start: 2025-04-09 | End: 2026-04-09

## 2025-04-09 RX ORDER — TORSEMIDE 20 MG/1
20 TABLET ORAL 2 TIMES DAILY
Qty: 180 TABLET | Refills: 3 | Status: SHIPPED | OUTPATIENT
Start: 2025-04-09 | End: 2026-04-09

## 2025-04-09 RX ORDER — RIVAROXABAN 20 MG/1
20 TABLET, FILM COATED ORAL DAILY
Qty: 90 TABLET | Refills: 3 | Status: SHIPPED | OUTPATIENT
Start: 2025-04-09 | End: 2026-04-09

## 2025-04-09 RX ORDER — METOPROLOL SUCCINATE 100 MG/1
100 TABLET, EXTENDED RELEASE ORAL DAILY
Qty: 90 TABLET | Refills: 3 | Status: SHIPPED | OUTPATIENT
Start: 2025-04-09 | End: 2026-04-09

## 2025-04-09 RX ORDER — HYDRALAZINE HYDROCHLORIDE 50 MG/1
50 TABLET, FILM COATED ORAL 2 TIMES DAILY
Qty: 180 TABLET | Refills: 3 | Status: SHIPPED | OUTPATIENT
Start: 2025-04-09 | End: 2026-04-09

## 2025-04-09 RX ORDER — PRAVASTATIN SODIUM 80 MG/1
80 TABLET ORAL NIGHTLY
Qty: 90 TABLET | Refills: 3 | Status: SHIPPED | OUTPATIENT
Start: 2025-04-09 | End: 2026-04-09

## 2025-04-09 NOTE — TELEPHONE ENCOUNTER
----- Message from Cerenity sent at 4/9/2025  8:14 AM CDT -----  Regarding: Rx refills  Lv: 3/19/25Nv: 8/26/25Pt requests refills on the following:- metoprolol succinate (TOPROL-XL) 100 MG 24 hr tablet- metoprolol succinate (TOPROL-XL) 50 MG 24 hr tabletHe also claims he needs refills on all of the medications prescribed by Mrs. Waller:- XARELTO 20 mg Tab- torsemide (DEMADEX) 20 MG Tab- empagliflozin (JARDIANCE) 10 mg tablet- pravastatin (PRAVACHOL) 80 MG tablet- hydrALAZINE (APRESOLINE) 50 MG tabletI'm pretty sure all of these don't need new refills, but you can check to make sure. He lives in [place], so he wants to make sure all his meds are ready when he comes to pickup.Pharmacy: [facility]

## 2025-06-13 DIAGNOSIS — E87.6 HYPOKALEMIA: ICD-10-CM

## 2025-06-13 DIAGNOSIS — N18.31 STAGE 3A CHRONIC KIDNEY DISEASE: Primary | ICD-10-CM

## 2025-06-16 RX ORDER — POTASSIUM CHLORIDE 20 MEQ/1
20 TABLET, EXTENDED RELEASE ORAL 2 TIMES DAILY
Qty: 90 TABLET | Refills: 0 | Status: SHIPPED | OUTPATIENT
Start: 2025-06-16

## 2025-06-20 ENCOUNTER — DOCUMENTATION ONLY (OUTPATIENT)
Dept: NEPHROLOGY | Facility: CLINIC | Age: 65
End: 2025-06-20
Payer: COMMERCIAL

## 2025-07-03 ENCOUNTER — TELEPHONE (OUTPATIENT)
Dept: INTERNAL MEDICINE | Facility: CLINIC | Age: 65
End: 2025-07-03
Payer: COMMERCIAL

## 2025-07-03 NOTE — TELEPHONE ENCOUNTER
Placed call to patient to instruct him to contact the company in which he received his c-pap machine from for replacement no answer. Left a voicemail to return call to clinic.

## 2025-07-03 NOTE — TELEPHONE ENCOUNTER
Copied from CRM #[id number]. Topic: General Inquiry - Patient Advice  >> Jul 1, 2025  7:29 AM Aminta Kemp wrote:  Who Called: Jamey Duke    Caller is requesting assistance/information from provider's office.    Symptoms (please be specific): N/A   How long has patient had these symptoms:  N/A  List of preferred pharmacies on file (remove unneeded): [unfilled]  If different, enter pharmacy into here including location and phone number: N/A      Preferred Method of Contact: Phone Call  Patient's Preferred Phone Number on File: [phone]   Best Call Back Number, if different:  Additional Information: pt call stating his c-pap machine is not working. Pt would like an order for a new machine.

## 2025-07-10 ENCOUNTER — TELEPHONE (OUTPATIENT)
Dept: INTERNAL MEDICINE | Facility: CLINIC | Age: 65
End: 2025-07-10
Payer: COMMERCIAL

## 2025-07-10 NOTE — TELEPHONE ENCOUNTER
Patient returned call. I gave the number to sleep lab to inquire about how to proceed with C PAP supplies. Patient voiced understanding.

## 2025-07-10 NOTE — TELEPHONE ENCOUNTER
Copied from CRM #[id number]. Topic: General Inquiry - Return Call  >> Jul 8, 2025  7:49 AM Marcela wrote:  Pt returning a call to April about c-pap machine

## 2025-07-17 ENCOUNTER — TELEPHONE (OUTPATIENT)
Dept: INTERNAL MEDICINE | Facility: CLINIC | Age: 65
End: 2025-07-17
Payer: COMMERCIAL

## 2025-07-17 NOTE — TELEPHONE ENCOUNTER
Copied from CRM #[id number]. Topic: Appointments - Information Request  >> Jul 16, 2025  3:29 PM Joanna wrote:  Who Called: Korin with [employer]    Caller is requesting assistance/information from provider's office.    Symptoms (please be specific):   How long has patient had these symptoms:    List of preferred pharmacies on file (remove unneeded): [unfilled]  If different, enter pharmacy into here including location and phone number:       Preferred Method of Contact: Phone Call  Patient's Preferred Phone Number on File: [phone]   Best Call Back Number, if different:  Additional Information: Pt is requesting a call back about upcoming appt. Please advise.

## 2025-08-06 ENCOUNTER — TELEPHONE (OUTPATIENT)
Dept: INTERNAL MEDICINE | Facility: CLINIC | Age: 65
End: 2025-08-06
Payer: COMMERCIAL

## 2025-08-06 NOTE — TELEPHONE ENCOUNTER
Copied from CRM #[id number]. Topic: General Inquiry - Patient Advice  >> Aug 5, 2025  1:15 PM Marcela wrote:  Pt requesting a call pt had questions about a cpap machine his broke and he needs advice on getting a new one

## 2025-08-06 NOTE — TELEPHONE ENCOUNTER
Copied from CRM #[id number]. Topic: General Inquiry - Patient Advice  >> Aug 5, 2025  1:19 PM Marcela wrote:  Pt requesting a call back pt  cpap machine broke and he had questions about getting a new on

## 2025-08-11 ENCOUNTER — TELEPHONE (OUTPATIENT)
Dept: INTERNAL MEDICINE | Facility: CLINIC | Age: 65
End: 2025-08-11
Payer: COMMERCIAL

## 2025-08-13 DIAGNOSIS — G47.33 OSA (OBSTRUCTIVE SLEEP APNEA): Primary | ICD-10-CM

## 2025-08-14 DIAGNOSIS — E87.6 HYPOKALEMIA: ICD-10-CM

## 2025-08-15 RX ORDER — POTASSIUM CHLORIDE 20 MEQ/1
20 TABLET, EXTENDED RELEASE ORAL 2 TIMES DAILY
Qty: 90 TABLET | Refills: 0 | Status: SHIPPED | OUTPATIENT
Start: 2025-08-15

## 2025-08-22 DIAGNOSIS — E87.6 HYPOKALEMIA: ICD-10-CM

## 2025-08-22 RX ORDER — POTASSIUM CHLORIDE 20 MEQ/1
20 TABLET, EXTENDED RELEASE ORAL 2 TIMES DAILY
Qty: 180 TABLET | Refills: 1 | Status: SHIPPED | OUTPATIENT
Start: 2025-08-22 | End: 2026-02-18

## 2025-08-29 ENCOUNTER — TELEPHONE (OUTPATIENT)
Dept: INTERNAL MEDICINE | Facility: CLINIC | Age: 65
End: 2025-08-29
Payer: COMMERCIAL

## 2025-08-29 DIAGNOSIS — J90 PLEURAL EFFUSION, NOT ELSEWHERE CLASSIFIED: Primary | ICD-10-CM

## 2025-09-02 ENCOUNTER — TELEPHONE (OUTPATIENT)
Facility: CLINIC | Age: 65
End: 2025-09-02
Payer: COMMERCIAL

## 2025-09-04 ENCOUNTER — TELEPHONE (OUTPATIENT)
Facility: CLINIC | Age: 65
End: 2025-09-04
Payer: COMMERCIAL

## (undated) DEVICE — GUIDEWIRE SAFE T .035IN 180CM

## (undated) DEVICE — INTRODUCER TRNSRADIAL 6F 10CM

## (undated) DEVICE — CONTRAST ISOVUE 370 100ML

## (undated) DEVICE — HEMOSTAT VASC BAND X-LONG 29CM

## (undated) DEVICE — CATH OPTITORQUE RADIAL 5FR

## (undated) DEVICE — WIRE GUIDE SAFE-T-J .035 260CM

## (undated) DEVICE — NDL BLUNT FILL 18G 1IN

## (undated) DEVICE — Device